# Patient Record
Sex: MALE | Race: WHITE | Employment: OTHER | URBAN - METROPOLITAN AREA
[De-identification: names, ages, dates, MRNs, and addresses within clinical notes are randomized per-mention and may not be internally consistent; named-entity substitution may affect disease eponyms.]

---

## 2017-02-14 RX ORDER — ENALAPRIL MALEATE 10 MG/1
TABLET ORAL
Qty: 90 TABLET | Refills: 0 | Status: SHIPPED | OUTPATIENT
Start: 2017-02-14 | End: 2017-06-13 | Stop reason: SDUPTHER

## 2017-02-14 RX ORDER — AMLODIPINE BESYLATE 10 MG/1
TABLET ORAL
Qty: 90 TABLET | Refills: 0 | Status: SHIPPED | OUTPATIENT
Start: 2017-02-14 | End: 2017-06-13 | Stop reason: SDUPTHER

## 2017-06-13 RX ORDER — ENALAPRIL MALEATE 10 MG/1
TABLET ORAL
Qty: 90 TABLET | Refills: 0 | Status: SHIPPED | OUTPATIENT
Start: 2017-06-13 | End: 2017-09-07 | Stop reason: SDUPTHER

## 2017-06-13 RX ORDER — AMLODIPINE BESYLATE 10 MG/1
TABLET ORAL
Qty: 90 TABLET | Refills: 0 | Status: SHIPPED | OUTPATIENT
Start: 2017-06-13 | End: 2017-09-07 | Stop reason: SDUPTHER

## 2017-09-07 RX ORDER — AMLODIPINE BESYLATE 10 MG/1
TABLET ORAL
Qty: 90 TABLET | Refills: 0 | Status: SHIPPED | OUTPATIENT
Start: 2017-09-07 | End: 2017-12-01 | Stop reason: SDUPTHER

## 2017-09-07 RX ORDER — ENALAPRIL MALEATE 10 MG/1
TABLET ORAL
Qty: 90 TABLET | Refills: 0 | Status: SHIPPED | OUTPATIENT
Start: 2017-09-07 | End: 2017-12-01 | Stop reason: SDUPTHER

## 2017-12-01 RX ORDER — ENALAPRIL MALEATE 10 MG/1
TABLET ORAL
Qty: 90 TABLET | Refills: 0 | Status: SHIPPED | OUTPATIENT
Start: 2017-12-01 | End: 2020-02-21

## 2017-12-01 RX ORDER — AMLODIPINE BESYLATE 10 MG/1
TABLET ORAL
Qty: 90 TABLET | Refills: 0 | Status: SHIPPED | OUTPATIENT
Start: 2017-12-01 | End: 2018-02-25 | Stop reason: SDUPTHER

## 2017-12-01 NOTE — TELEPHONE ENCOUNTER
Next Visit Date:  No future appointments.     Health Maintenance   Topic Date Due    Hepatitis C screen  1950    DTaP/Tdap/Td vaccine (1 - Tdap) 05/18/1967    Colon cancer screen colonoscopy  05/18/1998    Zostavax vaccine  05/18/2008    Pneumococcal low/med risk (1 of 2 - PCV13) 05/18/2013    Flu vaccine (1) 09/01/2017    Lipid screen  01/30/2020       No results found for: LABA1C          ( goal A1C is < 7)   No results found for: LABMICR  LDL Calculated (mg/dL)   Date Value   01/30/2015 110       (goal LDL is <100)   AST (U/L)   Date Value   04/09/2012 25     ALT (U/L)   Date Value   04/09/2012 32     BUN (mg/dL)   Date Value   01/15/2015 11     BP Readings from Last 3 Encounters:   02/08/16 114/74   07/22/15 120/80   02/23/15 122/72          (goal 120/80)    All Future Testing planned in [facility]              Patient Active Problem List:     History of calcium pyrophosphate deposition disease (CPPD)     Inflammatory arthropathy

## 2017-12-04 RX ORDER — ENALAPRIL MALEATE 10 MG/1
TABLET ORAL
Qty: 90 TABLET | Refills: 0 | Status: SHIPPED | OUTPATIENT
Start: 2017-12-04 | End: 2017-12-06 | Stop reason: SDUPTHER

## 2017-12-06 ENCOUNTER — OFFICE VISIT (OUTPATIENT)
Dept: FAMILY MEDICINE CLINIC | Age: 67
End: 2017-12-06
Payer: MEDICARE

## 2017-12-06 VITALS
OXYGEN SATURATION: 96 % | SYSTOLIC BLOOD PRESSURE: 126 MMHG | HEART RATE: 72 BPM | WEIGHT: 240.5 LBS | DIASTOLIC BLOOD PRESSURE: 80 MMHG | BODY MASS INDEX: 36.57 KG/M2

## 2017-12-06 DIAGNOSIS — Z72.89 OTHER PROBLEMS RELATED TO LIFESTYLE: ICD-10-CM

## 2017-12-06 DIAGNOSIS — R06.02 SOB (SHORTNESS OF BREATH): Primary | ICD-10-CM

## 2017-12-06 DIAGNOSIS — Z12.11 COLON CANCER SCREENING: ICD-10-CM

## 2017-12-06 DIAGNOSIS — Z00.00 WELL ADULT EXAM: ICD-10-CM

## 2017-12-06 DIAGNOSIS — M79.604 PAIN OF RIGHT LOWER EXTREMITY: ICD-10-CM

## 2017-12-06 DIAGNOSIS — I10 ESSENTIAL HYPERTENSION: ICD-10-CM

## 2017-12-06 PROCEDURE — 1036F TOBACCO NON-USER: CPT | Performed by: FAMILY MEDICINE

## 2017-12-06 PROCEDURE — 4040F PNEUMOC VAC/ADMIN/RCVD: CPT | Performed by: FAMILY MEDICINE

## 2017-12-06 PROCEDURE — 99214 OFFICE O/P EST MOD 30 MIN: CPT | Performed by: FAMILY MEDICINE

## 2017-12-06 PROCEDURE — 1123F ACP DISCUSS/DSCN MKR DOCD: CPT | Performed by: FAMILY MEDICINE

## 2017-12-06 PROCEDURE — 3017F COLORECTAL CA SCREEN DOC REV: CPT | Performed by: FAMILY MEDICINE

## 2017-12-06 PROCEDURE — G8484 FLU IMMUNIZE NO ADMIN: HCPCS | Performed by: FAMILY MEDICINE

## 2017-12-06 PROCEDURE — G8417 CALC BMI ABV UP PARAM F/U: HCPCS | Performed by: FAMILY MEDICINE

## 2017-12-06 PROCEDURE — G8427 DOCREV CUR MEDS BY ELIG CLIN: HCPCS | Performed by: FAMILY MEDICINE

## 2017-12-06 NOTE — PROGRESS NOTES
rhonchi noted. No respiratory retractions noted. Wall has symmetrical movement with respirations. No plapable tenderness of the hips /back    From of the le  Assessment:   Encounter Diagnoses   Name Primary?  Colon cancer screening     SOB (shortness of breath) Yes    Pain of right lower extremity     Well adult exam     Essential hypertension     Other problems related to lifestyle          Plan:   Orders Placed This Encounter   Procedures    COLONOSCOPY (Screening)     Order Specific Question:   Screening or Diagnostic? Answer:   Screening    CBC Auto Differential     Standing Status:   Future     Standing Expiration Date:   12/6/2018    Comprehensive Metabolic Panel     Standing Status:   Future     Standing Expiration Date:   12/6/2018    Lipid Panel     Standing Status:   Future     Standing Expiration Date:   12/6/2018     Order Specific Question:   Is Patient Fasting?/# of Hours     Answer:   8 hour fasting (no calories)    TSH With Reflex Ft4     Standing Status:   Future     Standing Expiration Date:   12/6/2018    Hepatitis C Antibody     Standing Status:   Future     Standing Expiration Date:   12/6/2018     Move legs while driving.     Sounds like ue deconditioning

## 2017-12-07 LAB — HEPATITIS C ANTIBODY: NEGATIVE

## 2017-12-08 ENCOUNTER — TELEPHONE (OUTPATIENT)
Dept: FAMILY MEDICINE CLINIC | Age: 67
End: 2017-12-08

## 2017-12-08 DIAGNOSIS — Z12.11 SCREENING FOR COLON CANCER: Primary | ICD-10-CM

## 2017-12-08 LAB
ABSOLUTE BASO #: 0.1 K/UL (ref 0–0.1)
ABSOLUTE EOS #: 0.3 K/UL (ref 0.1–0.4)
ABSOLUTE LYMPH #: 2.2 K/UL (ref 0.8–5.2)
ABSOLUTE MONO #: 0.6 K/UL (ref 0.1–0.9)
ABSOLUTE NEUT #: 3.6 K/UL (ref 1.3–9.1)
ALBUMIN SERPL-MCNC: 4.3 G/DL (ref 3.2–5.3)
ALK PHOSPHATASE: 66 IU/L (ref 35–121)
ALT SERPL-CCNC: 23 IU/L (ref 5–59)
ANION GAP SERPL CALCULATED.3IONS-SCNC: 12 MMOL/L
AST SERPL-CCNC: 21 IU/L (ref 10–42)
BASOPHILS RELATIVE PERCENT: 1.3 %
BILIRUB SERPL-MCNC: 0.4 MG/DL (ref 0.2–1.3)
BUN BLDV-MCNC: 12 MG/DL (ref 10–20)
CALCIUM SERPL-MCNC: 9.3 MG/DL (ref 8.7–10.8)
CHLORIDE BLD-SCNC: 104 MMOL/L (ref 95–111)
CHOLESTEROL/HDL RATIO: 4.5
CHOLESTEROL: 214 MG/DL
CO2: 24 MMOL/L (ref 21–32)
CREAT SERPL-MCNC: 1 MG/DL (ref 0.5–1.3)
EGFR AFRICAN AMERICAN: 90
EGFR IF NONAFRICAN AMERICAN: 74
EOSINOPHILS RELATIVE PERCENT: 4.4 %
GLUCOSE: 125 MG/DL (ref 70–100)
HCT VFR BLD CALC: 42.7 % (ref 41.4–51)
HDLC SERPL-MCNC: 48 MG/DL (ref 40–60)
HEMOGLOBIN: 14.3 G/DL (ref 13.8–17)
LDL CHOLESTEROL CALCULATED: 134 MG/DL
LDL/HDL RATIO: 2.8
LYMPHOCYTE %: 32.2 %
MCH RBC QN AUTO: 28.1 PG (ref 27–34)
MCHC RBC AUTO-ENTMCNC: 33.5 G/DL (ref 31–36)
MCV RBC AUTO: 84.1 FL (ref 80–100)
MONOCYTES # BLD: 8.3 %
NEUTROPHILS RELATIVE PERCENT: 53.4 %
PDW BLD-RTO: 13.2 % (ref 10.8–14.8)
PLATELETS: 247 K/UL (ref 150–450)
POTASSIUM SERPL-SCNC: 4 MMOL/L (ref 3.5–5.4)
RBC: 5.08 M/UL (ref 4–5.5)
SODIUM BLD-SCNC: 136 MMOL/L (ref 134–147)
T4 FREE: 1.15 NG/DL (ref 0.8–1.8)
TOTAL PROTEIN: 6.7 G/DL (ref 5.8–8)
TRIGL SERPL-MCNC: 159 MG/DL
TSH SERPL DL<=0.05 MIU/L-ACNC: 1.46 UIU/ML (ref 0.4–4.4)
VLDLC SERPL CALC-MCNC: 32 MG/DL
WBC: 6.8 K/UL (ref 3.7–10.8)

## 2017-12-11 DIAGNOSIS — R79.89 ELEVATED TSH: Primary | ICD-10-CM

## 2017-12-14 DIAGNOSIS — I10 ESSENTIAL HYPERTENSION: ICD-10-CM

## 2017-12-14 DIAGNOSIS — Z72.89 OTHER PROBLEMS RELATED TO LIFESTYLE: ICD-10-CM

## 2017-12-14 DIAGNOSIS — Z00.00 WELL ADULT EXAM: ICD-10-CM

## 2017-12-14 DIAGNOSIS — R79.89 ELEVATED TSH: ICD-10-CM

## 2017-12-14 DIAGNOSIS — R06.02 SOB (SHORTNESS OF BREATH): ICD-10-CM

## 2017-12-18 DIAGNOSIS — Z12.11 COLON CANCER SCREENING: Primary | ICD-10-CM

## 2017-12-18 RX ORDER — POLYETHYLENE GLYCOL 3350 17 G/17G
POWDER, FOR SOLUTION ORAL
Qty: 255 G | Refills: 0 | Status: SHIPPED | OUTPATIENT
Start: 2017-12-18 | End: 2018-01-17

## 2017-12-29 ENCOUNTER — HOSPITAL ENCOUNTER (OUTPATIENT)
Age: 67
Setting detail: OUTPATIENT SURGERY
Discharge: HOME | End: 2017-12-29
Attending: INTERNAL MEDICINE | Admitting: INTERNAL MEDICINE
Payer: MEDICARE

## 2017-12-29 VITALS
HEART RATE: 80 BPM | SYSTOLIC BLOOD PRESSURE: 111 MMHG | WEIGHT: 230 LBS | BODY MASS INDEX: 36.1 KG/M2 | TEMPERATURE: 97.9 F | DIASTOLIC BLOOD PRESSURE: 80 MMHG | HEIGHT: 67 IN | OXYGEN SATURATION: 100 % | RESPIRATION RATE: 14 BRPM

## 2017-12-29 PROCEDURE — 7100000011 HC PHASE II RECOVERY - ADDTL 15 MIN: Performed by: INTERNAL MEDICINE

## 2017-12-29 PROCEDURE — 99153 MOD SED SAME PHYS/QHP EA: CPT | Performed by: INTERNAL MEDICINE

## 2017-12-29 PROCEDURE — 2580000003 HC RX 258: Performed by: INTERNAL MEDICINE

## 2017-12-29 PROCEDURE — 3609027000 HC COLONOSCOPY: Performed by: INTERNAL MEDICINE

## 2017-12-29 PROCEDURE — 6360000002 HC RX W HCPCS: Performed by: INTERNAL MEDICINE

## 2017-12-29 PROCEDURE — 99152 MOD SED SAME PHYS/QHP 5/>YRS: CPT | Performed by: INTERNAL MEDICINE

## 2017-12-29 PROCEDURE — 7100000010 HC PHASE II RECOVERY - FIRST 15 MIN: Performed by: INTERNAL MEDICINE

## 2017-12-29 RX ORDER — SODIUM CHLORIDE 9 MG/ML
INJECTION, SOLUTION INTRAVENOUS CONTINUOUS
Status: DISCONTINUED | OUTPATIENT
Start: 2017-12-29 | End: 2017-12-29 | Stop reason: HOSPADM

## 2017-12-29 RX ORDER — FENTANYL CITRATE 50 UG/ML
INJECTION, SOLUTION INTRAMUSCULAR; INTRAVENOUS PRN
Status: DISCONTINUED | OUTPATIENT
Start: 2017-12-29 | End: 2017-12-29 | Stop reason: HOSPADM

## 2017-12-29 RX ORDER — MIDAZOLAM HYDROCHLORIDE 1 MG/ML
INJECTION INTRAMUSCULAR; INTRAVENOUS PRN
Status: DISCONTINUED | OUTPATIENT
Start: 2017-12-29 | End: 2017-12-29 | Stop reason: HOSPADM

## 2017-12-29 RX ADMIN — SODIUM CHLORIDE: 9 INJECTION, SOLUTION INTRAVENOUS at 09:08

## 2017-12-29 RX ADMIN — SODIUM CHLORIDE: 9 INJECTION, SOLUTION INTRAVENOUS at 10:30

## 2017-12-29 ASSESSMENT — PAIN - FUNCTIONAL ASSESSMENT: PAIN_FUNCTIONAL_ASSESSMENT: 0-10

## 2017-12-29 ASSESSMENT — PAIN SCALES - GENERAL
PAINLEVEL_OUTOF10: 0
PAINLEVEL_OUTOF10: 0

## 2017-12-29 NOTE — H&P
BY MOUTH ONE TIME A DAY 90 tablet 0    oxyCODONE-acetaminophen (PERCOCET) 5-325 MG per tablet Take by mouth every 4 hours as needed  0    polyethylene glycol (GLYCOLAX) powder Please dispense with 4 dulcolax tabs with this prescription. Use as directed by following your patient instructions given by office. 255 g 0    celecoxib (CELEBREX) 200 MG capsule Take by mouth daily  0    CIALIS 20 MG tablet TAKE AS DIRECTED BY YOUR PRESCRIBER 90 tablet 3    aspirin 81 MG tablet Take 81 mg by mouth daily.  Multiple Vitamins-Minerals (MULTIVITAMIN PO) Take  by mouth.  CALCIUM PO Take  by mouth. General health:  Fairly good. No fever or chills. Skin:  No itching, redness or rash. HEENT:  No headache, epistaxis or sore throat. Glasses, upper/lower dentures. Neck:  No pain, stiffness or masses. Cardiovascular/Respiratory system:  No chest pain, palpitation or shortness of breath. Gastrointestinal tract: No abdominal pain, nausea, vomiting, diarrhea or constipation. Genitourinary:  No burning on micturition. No hesitancy, urgency, frequency or discoloration of urine. Locomotor:  No bone or joint pains. No swelling. Arthritis, hands. Neuropsychiatric:  No referable complaints. See HPI. GENERAL PHYSICAL EXAM:     Vitals: /76   Pulse 81   Temp 97.5 °F (36.4 °C) (Oral)   Resp 18   Ht 5' 7\" (1.702 m)   Wt 230 lb (104.3 kg)   SpO2 95%   BMI 36.02 kg/m²  Body mass index is 36.02 kg/m². GENERAL APPEARANCE:   Fay Loveless is 69 y.o.,  male, moderately obese, nourished, conscious, alert. Does not appear to be distress or pain at this time. SKIN:  Warm, dry, no cyanosis or jaundice. HEAD:  Normocephalic, atraumatic, no swelling or tenderness. EYES:  Pupils equal, reactive to light. EARS:  No discharge, no marked hearing loss. NOSE:  No rhinorrhea, epistaxis or septal deformity. THROAT:  Not congested. No ulceration bleeding or discharge. Upper/lower denture                 NECK:  No stiffness, trachea central.  No palpable masses or L.N.                 CHEST:  Symmetrical and equal on expansion. HEART:  RRR S1 > S2. No audible murmurs or gallops. LUNGS:  Equal on expansion, normal breath sounds. No adventitious sounds. ABDOMEN:  Obese. Soft on palpation. No localized tenderness. No guarding or rigidity. No palpable organomegaly. LYMPHATICS:  No palpable cervical lymphadenopathy. LOCOMOTOR, BACK AND SPINE:  No tenderness or deformities. EXTREMITIES:  Symmetrical, no pedal edema. Pedros sign negative. No discoloration or ulcerations. NEUROLOGIC:  The patient is conscious, alert, oriented, No apparent focal sensory or motor deficits.                                                                                      PROVISIONAL DIAGNOSES / SURGERY:      Colonoscopy      Patient Active Problem List    Diagnosis Date Noted    Diverticulosis of colon     Colon cancer screening 12/06/2017    History of calcium pyrophosphate deposition disease (CPPD) 02/23/2015    Inflammatory arthropathy 02/23/2015         ASA 2, Mallampati 2  JEFFERSON GILLESPIE NP on 12/29/2017 at 9:09 AM

## 2017-12-29 NOTE — OP NOTE
COLONOSCOPY    DATE OF PROCEDURE: 12/29/2017    SURGEON: Errol Bumpers, MD    ASSISTANT: None    PREOPERATIVE DIAGNOSIS: Screening examination    POSTOPERATIVE DIAGNOSIS: Diverticulosis    OPERATION: Total colonoscopy     ANESTHESIA: Moderate Sedation    ESTIMATED BLOOD LOSS: None    COMPLICATIONS: None     SPECIMENS:  Was Not Obtained    HISTORY: The patient is a 69y.o. year old male with history of above preop diagnosis. I recommended colonoscopy with possible biopsy or polypectomy and I explained the risk, benefits, expected outcome, and alternatives to the procedure. Risks included but are not limited to bleeding, infection, respiratory distress, hypotension, and perforation of the colon and possibility of missing a lesion. The patient understands and is in agreement. PROCEDURE: The patient was given IV conscious sedation. The patient's SPO2 remained above 90% throughout the procedure. Digital rectal exam was normal.  The colonoscope was inserted through the anus into the rectum and advanced under direct vision to the cecum without difficulty. The prep was good. yp  Findings:      Cecum/Ascending colon: normal    Transverse colon: normal    Descending/Sigmoid colon: normal , Has diverticulosis    Rectum/Anus: examined in normal and retroflexed positions and was normal    Withdrawal Time was (minutes): 10          The colon was decompressed and the scope was removed. The patient tolerated the procedure well without complications. Recommendations/Plan:   1. F/U Biopsies  2. F/U In Office as instructed  3. Discussed with the family  4. High fiber diet   5. Precautions to avoid constipation     Next colonoscopy: 10 years.     Electronically signed by Errol Bumpers, MD  on 12/29/2017 at 10:12 AM

## 2018-02-02 LAB
T4 FREE: 1.09 NG/DL (ref 0.8–1.8)
TSH SERPL DL<=0.05 MIU/L-ACNC: 2.18 UIU/ML (ref 0.4–4.4)

## 2018-02-25 RX ORDER — AMLODIPINE BESYLATE 10 MG/1
TABLET ORAL
Qty: 90 TABLET | Refills: 0 | Status: SHIPPED | OUTPATIENT
Start: 2018-02-25 | End: 2018-03-06 | Stop reason: SDUPTHER

## 2018-03-06 RX ORDER — ENALAPRIL MALEATE 10 MG/1
TABLET ORAL
Qty: 90 TABLET | Refills: 0 | Status: SHIPPED | OUTPATIENT
Start: 2018-03-06 | End: 2018-08-28 | Stop reason: SDUPTHER

## 2018-03-06 RX ORDER — AMLODIPINE BESYLATE 10 MG/1
TABLET ORAL
Qty: 90 TABLET | Refills: 0 | Status: SHIPPED | OUTPATIENT
Start: 2018-03-06 | End: 2018-08-28 | Stop reason: SDUPTHER

## 2018-03-06 NOTE — TELEPHONE ENCOUNTER
Next Visit Date:  No future appointments.     Health Maintenance   Topic Date Due    AAA screen  1950    DTaP/Tdap/Td vaccine (1 - Tdap) 05/18/1967    Diabetes screen  05/18/1988    Shingles Vaccine (1 of 2 - 2 Dose Series) 05/18/1998    Pneumococcal low/med risk (1 of 2 - PCV13) 05/18/2013    Flu vaccine (1) 09/24/2018 (Originally 9/1/2017)    Potassium monitoring  12/07/2018    Creatinine monitoring  12/07/2018    Lipid screen  12/07/2022    Colon cancer screen colonoscopy  12/29/2027    Hepatitis C screen  Completed       No results found for: LABA1C          ( goal A1C is < 7)   No results found for: LABMICR  LDL Calculated (mg/dL)   Date Value   12/07/2017 134 (H)       (goal LDL is <100)   AST (IU/L)   Date Value   12/07/2017 21     ALT (IU/L)   Date Value   12/07/2017 23     BUN (mg/dl)   Date Value   12/07/2017 12     BP Readings from Last 3 Encounters:   12/29/17 111/80   12/06/17 126/80   02/08/16 114/74          (goal 120/80)    All Future Testing planned in [facility]              Patient Active Problem List:     History of calcium pyrophosphate deposition disease (CPPD)     Inflammatory arthropathy     Colon cancer screening     Diverticulosis of colon

## 2018-04-12 PROBLEM — Z12.11 COLON CANCER SCREENING: Status: RESOLVED | Noted: 2017-12-06 | Resolved: 2018-04-12

## 2018-04-17 ENCOUNTER — TELEPHONE (OUTPATIENT)
Dept: FAMILY MEDICINE CLINIC | Age: 68
End: 2018-04-17

## 2018-04-27 ENCOUNTER — OFFICE VISIT (OUTPATIENT)
Dept: FAMILY MEDICINE CLINIC | Age: 68
End: 2018-04-27
Payer: MEDICARE

## 2018-04-27 VITALS
HEART RATE: 66 BPM | DIASTOLIC BLOOD PRESSURE: 78 MMHG | OXYGEN SATURATION: 97 % | BODY MASS INDEX: 34.93 KG/M2 | WEIGHT: 223 LBS | SYSTOLIC BLOOD PRESSURE: 138 MMHG

## 2018-04-27 DIAGNOSIS — I10 ESSENTIAL HYPERTENSION: Primary | ICD-10-CM

## 2018-04-27 PROCEDURE — 4040F PNEUMOC VAC/ADMIN/RCVD: CPT | Performed by: FAMILY MEDICINE

## 2018-04-27 PROCEDURE — 1123F ACP DISCUSS/DSCN MKR DOCD: CPT | Performed by: FAMILY MEDICINE

## 2018-04-27 PROCEDURE — G8417 CALC BMI ABV UP PARAM F/U: HCPCS | Performed by: FAMILY MEDICINE

## 2018-04-27 PROCEDURE — 1036F TOBACCO NON-USER: CPT | Performed by: FAMILY MEDICINE

## 2018-04-27 PROCEDURE — G8427 DOCREV CUR MEDS BY ELIG CLIN: HCPCS | Performed by: FAMILY MEDICINE

## 2018-04-27 PROCEDURE — 99213 OFFICE O/P EST LOW 20 MIN: CPT | Performed by: FAMILY MEDICINE

## 2018-04-27 PROCEDURE — 3017F COLORECTAL CA SCREEN DOC REV: CPT | Performed by: FAMILY MEDICINE

## 2018-04-27 ASSESSMENT — PATIENT HEALTH QUESTIONNAIRE - PHQ9
2. FEELING DOWN, DEPRESSED OR HOPELESS: 0
SUM OF ALL RESPONSES TO PHQ9 QUESTIONS 1 & 2: 0
SUM OF ALL RESPONSES TO PHQ QUESTIONS 1-9: 0
1. LITTLE INTEREST OR PLEASURE IN DOING THINGS: 0

## 2018-08-28 NOTE — TELEPHONE ENCOUNTER
Last visit: 4-27-18      Next Visit Date:  Future Appointments  Date Time Provider Yasmani Anomi   10/11/2018 1:00 PM Alban Adames  [address] [facility]   Topic Date Due    AAA screen  1950    DTaP/Tdap/Td vaccine (1 - Tdap) 05/18/1967    Diabetes screen  05/18/1988    Shingles Vaccine (1 of 2 - 2 Dose Series) 05/18/1998    Pneumococcal low/med risk (1 of 2 - PCV13) 05/18/2013    Flu vaccine (1) 09/24/2018 (Originally 9/1/2018)    Potassium monitoring  12/07/2018    Creatinine monitoring  12/07/2018    Lipid screen  12/07/2022    Colon cancer screen colonoscopy  12/29/2027    Hepatitis C screen  Completed       No results found for: LABA1C          ( goal A1C is < 7)   No results found for: LABMICR  LDL Calculated (mg/dL)   Date Value   12/07/2017 134 (H)   01/30/2015 110       (goal LDL is <100)   AST (IU/L)   Date Value   12/07/2017 21     ALT (IU/L)   Date Value   12/07/2017 23     BUN (mg/dl)   Date Value   12/07/2017 12     BP Readings from Last 3 Encounters:   04/27/18 138/78   12/29/17 111/80   12/06/17 126/80          (goal 120/80)    All Future Testing planned in [facility]              Patient Active Problem List:     History of calcium pyrophosphate deposition disease (CPPD)     Inflammatory arthropathy     Diverticulosis of colon

## 2018-08-29 RX ORDER — ENALAPRIL MALEATE 10 MG/1
TABLET ORAL
Qty: 90 TABLET | Refills: 0 | Status: SHIPPED | OUTPATIENT
Start: 2018-08-29 | End: 2018-10-11 | Stop reason: SDUPTHER

## 2018-08-29 RX ORDER — AMLODIPINE BESYLATE 10 MG/1
TABLET ORAL
Qty: 90 TABLET | Refills: 0 | Status: SHIPPED | OUTPATIENT
Start: 2018-08-29 | End: 2018-11-28 | Stop reason: SDUPTHER

## 2018-10-11 ENCOUNTER — OFFICE VISIT (OUTPATIENT)
Dept: FAMILY MEDICINE CLINIC | Age: 68
End: 2018-10-11
Payer: MEDICARE

## 2018-10-11 VITALS
SYSTOLIC BLOOD PRESSURE: 120 MMHG | BODY MASS INDEX: 35.24 KG/M2 | WEIGHT: 225 LBS | OXYGEN SATURATION: 95 % | DIASTOLIC BLOOD PRESSURE: 80 MMHG | HEART RATE: 78 BPM

## 2018-10-11 DIAGNOSIS — I10 ESSENTIAL HYPERTENSION: Primary | ICD-10-CM

## 2018-10-11 PROCEDURE — G8427 DOCREV CUR MEDS BY ELIG CLIN: HCPCS | Performed by: FAMILY MEDICINE

## 2018-10-11 PROCEDURE — 4040F PNEUMOC VAC/ADMIN/RCVD: CPT | Performed by: FAMILY MEDICINE

## 2018-10-11 PROCEDURE — 1123F ACP DISCUSS/DSCN MKR DOCD: CPT | Performed by: FAMILY MEDICINE

## 2018-10-11 PROCEDURE — 3017F COLORECTAL CA SCREEN DOC REV: CPT | Performed by: FAMILY MEDICINE

## 2018-10-11 PROCEDURE — 99213 OFFICE O/P EST LOW 20 MIN: CPT | Performed by: FAMILY MEDICINE

## 2018-10-11 PROCEDURE — G8484 FLU IMMUNIZE NO ADMIN: HCPCS | Performed by: FAMILY MEDICINE

## 2018-10-11 PROCEDURE — 1101F PT FALLS ASSESS-DOCD LE1/YR: CPT | Performed by: FAMILY MEDICINE

## 2018-10-11 PROCEDURE — 1036F TOBACCO NON-USER: CPT | Performed by: FAMILY MEDICINE

## 2018-10-11 PROCEDURE — G8417 CALC BMI ABV UP PARAM F/U: HCPCS | Performed by: FAMILY MEDICINE

## 2018-10-11 RX ORDER — MELOXICAM 15 MG/1
TABLET ORAL DAILY
Refills: 1 | COMMUNITY
Start: 2018-09-17 | End: 2022-09-30

## 2018-10-11 NOTE — PROGRESS NOTES
Visit Information    Have you changed or started any medications since your last visit including any over-the-counter medicines, vitamins, or herbal medicines? no   Have you stopped taking any of your medications? Is so, why? -  no  Are you having any side effects from any of your medications? - no    Have you seen any other physician or provider since your last visit?  no   Have you had any other diagnostic tests since your last visit?  no   Have you been seen in the emergency room and/or had an admission in a hospital since we last saw you?  no   Have you had your routine dental cleaning in the past 6 months? Do you have an active MyChart account? If no, what is the barrier?   No    Patient Care Team:  Mohan Del Toro MD as PCP - General (Family Medicine)  Nalini Esparza (Rheumatology)  Lyn Terrell as Consulting Physician (Orthopedic Surgery)    Medical History Review  Past Medical, Family, and Social History reviewed and  contribute to the patient presenting condition    Health Maintenance   Topic Date Due    AAA screen  1950    DTaP/Tdap/Td vaccine (1 - Tdap) 05/18/1967    Diabetes screen  05/18/1988    Shingles Vaccine (1 of 2 - 2 Dose Series) 05/18/1998    Pneumococcal low/med risk (1 of 2 - PCV13) 05/18/2013    Flu vaccine (1) 09/01/2018    Potassium monitoring  12/07/2018    Creatinine monitoring  12/07/2018    Lipid screen  12/07/2022    Colon cancer screen colonoscopy  12/29/2027    Hepatitis C screen  Completed

## 2018-11-26 LAB
ALBUMIN SERPL-MCNC: 4.1 G/DL
ALP BLD-CCNC: 64 U/L
ALT SERPL-CCNC: 25 U/L
ANION GAP SERPL CALCULATED.3IONS-SCNC: NORMAL MMOL/L
AST SERPL-CCNC: 22 U/L
BASOPHILS ABSOLUTE: 0.08 /ΜL
BASOPHILS RELATIVE PERCENT: ABNORMAL %
BILIRUB SERPL-MCNC: 0.3 MG/DL (ref 0.1–1.4)
BUN BLDV-MCNC: 10 MG/DL
CALCIUM SERPL-MCNC: 9.5 MG/DL
CHLORIDE BLD-SCNC: 103 MMOL/L
CHOLESTEROL, TOTAL: 186 MG/DL
CHOLESTEROL/HDL RATIO: 4.4
CO2: 27 MMOL/L
CREAT SERPL-MCNC: 0.93 MG/DL
EOSINOPHILS ABSOLUTE: 0.64 /ΜL
EOSINOPHILS RELATIVE PERCENT: ABNORMAL %
GFR CALCULATED: 80.3
GLUCOSE BLD-MCNC: 125 MG/DL
HCT VFR BLD CALC: 40.6 % (ref 41–53)
HDLC SERPL-MCNC: 42 MG/DL (ref 35–70)
HEMOGLOBIN: 14.2 G/DL (ref 13.5–17.5)
LDL CHOLESTEROL CALCULATED: 111 MG/DL (ref 0–160)
LYMPHOCYTES ABSOLUTE: 1.97 /ΜL
LYMPHOCYTES RELATIVE PERCENT: ABNORMAL %
MCH RBC QN AUTO: 29.2 PG
MCHC RBC AUTO-ENTMCNC: 35 G/DL
MCV RBC AUTO: 83.5 FL
MONOCYTES ABSOLUTE: 0.62 /ΜL
MONOCYTES RELATIVE PERCENT: ABNORMAL %
NEUTROPHILS ABSOLUTE: 4.21 /ΜL
NEUTROPHILS RELATIVE PERCENT: ABNORMAL %
PDW BLD-RTO: 13.2 %
PLATELET # BLD: 229 K/ΜL
PMV BLD AUTO: 9.9 FL
POTASSIUM SERPL-SCNC: 4.2 MMOL/L
RBC # BLD: 4.86 10^6/ΜL
SODIUM BLD-SCNC: 139 MMOL/L
TOTAL PROTEIN: 6.8
TRIGL SERPL-MCNC: 167 MG/DL
TSH SERPL DL<=0.05 MIU/L-ACNC: 2.09 UIU/ML
VLDLC SERPL CALC-MCNC: 33 MG/DL
WBC # BLD: 7.25 10^3/ML

## 2018-11-28 DIAGNOSIS — I10 ESSENTIAL HYPERTENSION: ICD-10-CM

## 2018-11-29 RX ORDER — ENALAPRIL MALEATE 10 MG/1
TABLET ORAL
Qty: 90 TABLET | Refills: 0 | Status: SHIPPED | OUTPATIENT
Start: 2018-11-29 | End: 2019-04-11 | Stop reason: SDUPTHER

## 2018-11-29 RX ORDER — AMLODIPINE BESYLATE 10 MG/1
TABLET ORAL
Qty: 90 TABLET | Refills: 0 | Status: SHIPPED | OUTPATIENT
Start: 2018-11-29 | End: 2019-04-11 | Stop reason: SDUPTHER

## 2018-12-04 RX ORDER — ENALAPRIL MALEATE 10 MG/1
TABLET ORAL
Qty: 90 TABLET | Refills: 3 | Status: SHIPPED | OUTPATIENT
Start: 2018-12-04 | End: 2019-04-11 | Stop reason: SDUPTHER

## 2018-12-04 RX ORDER — AMLODIPINE BESYLATE 10 MG/1
TABLET ORAL
Qty: 90 TABLET | Refills: 3 | Status: SHIPPED | OUTPATIENT
Start: 2018-12-04 | End: 2020-02-21

## 2018-12-04 NOTE — TELEPHONE ENCOUNTER
Health Maintenance   Topic Date Due    AAA screen  1950    DTaP/Tdap/Td vaccine (1 - Tdap) 05/18/1967    Diabetes screen  05/18/1988    Shingles Vaccine (1 of 2 - 2 Dose Series) 05/18/1998    Pneumococcal low/med risk (1 of 2 - PCV13) 05/18/2013    Flu vaccine (1) 10/31/2019 (Originally 9/1/2018)    Potassium monitoring  11/26/2019    Creatinine monitoring  11/26/2019    Lipid screen  11/26/2023    Colon cancer screen colonoscopy  12/29/2027    Hepatitis C screen  Completed       No results found for: LABA1C          ( goal A1C is < 7)   No results found for: LABMICR  LDL Calculated (mg/dL)   Date Value   11/26/2018 111       (goal LDL is <100)   AST (U/L)   Date Value   11/26/2018 22     ALT (U/L)   Date Value   11/26/2018 25     BUN (mg/dL)   Date Value   11/26/2018 10     BP Readings from Last 3 Encounters:   10/11/18 120/80   04/27/18 138/78   12/29/17 111/80          (goal 120/80)    All Future Testing planned in [facility]      Next Visit Date:  Future Appointments  Date Time Provider Yasmani Salgado   4/11/2019 1:00 PM Fred Conner MD [facility] 40            Patient Active Problem List:     History of calcium pyrophosphate deposition disease (CPPD)     Inflammatory arthropathy     Diverticulosis of colon

## 2019-04-11 ENCOUNTER — OFFICE VISIT (OUTPATIENT)
Dept: FAMILY MEDICINE CLINIC | Age: 69
End: 2019-04-11
Payer: MEDICARE

## 2019-04-11 VITALS
HEART RATE: 72 BPM | HEIGHT: 68 IN | SYSTOLIC BLOOD PRESSURE: 126 MMHG | BODY MASS INDEX: 35.52 KG/M2 | OXYGEN SATURATION: 97 % | WEIGHT: 234.4 LBS | DIASTOLIC BLOOD PRESSURE: 82 MMHG

## 2019-04-11 DIAGNOSIS — M54.5 ACUTE LOW BACK PAIN, UNSPECIFIED BACK PAIN LATERALITY, WITH SCIATICA PRESENCE UNSPECIFIED: ICD-10-CM

## 2019-04-11 DIAGNOSIS — I10 ESSENTIAL HYPERTENSION: Primary | ICD-10-CM

## 2019-04-11 DIAGNOSIS — Z13.6 ENCOUNTER FOR ABDOMINAL AORTIC ANEURYSM (AAA) SCREENING: ICD-10-CM

## 2019-04-11 PROCEDURE — G8427 DOCREV CUR MEDS BY ELIG CLIN: HCPCS | Performed by: FAMILY MEDICINE

## 2019-04-11 PROCEDURE — 4040F PNEUMOC VAC/ADMIN/RCVD: CPT | Performed by: FAMILY MEDICINE

## 2019-04-11 PROCEDURE — 3017F COLORECTAL CA SCREEN DOC REV: CPT | Performed by: FAMILY MEDICINE

## 2019-04-11 PROCEDURE — G0009 ADMIN PNEUMOCOCCAL VACCINE: HCPCS | Performed by: FAMILY MEDICINE

## 2019-04-11 PROCEDURE — G8417 CALC BMI ABV UP PARAM F/U: HCPCS | Performed by: FAMILY MEDICINE

## 2019-04-11 PROCEDURE — 1123F ACP DISCUSS/DSCN MKR DOCD: CPT | Performed by: FAMILY MEDICINE

## 2019-04-11 PROCEDURE — 1036F TOBACCO NON-USER: CPT | Performed by: FAMILY MEDICINE

## 2019-04-11 PROCEDURE — 99214 OFFICE O/P EST MOD 30 MIN: CPT | Performed by: FAMILY MEDICINE

## 2019-04-11 PROCEDURE — 90670 PCV13 VACCINE IM: CPT | Performed by: FAMILY MEDICINE

## 2019-04-11 ASSESSMENT — PATIENT HEALTH QUESTIONNAIRE - PHQ9
SUM OF ALL RESPONSES TO PHQ QUESTIONS 1-9: 0
SUM OF ALL RESPONSES TO PHQ QUESTIONS 1-9: 0
1. LITTLE INTEREST OR PLEASURE IN DOING THINGS: 0
2. FEELING DOWN, DEPRESSED OR HOPELESS: 0
SUM OF ALL RESPONSES TO PHQ9 QUESTIONS 1 & 2: 0

## 2019-04-11 NOTE — PROGRESS NOTES
HYPERTENSION visit     BP Readings from Last 3 Encounters:   10/11/18 120/80   18 138/78   17 111/80       LDL Calculated (mg/dL)   Date Value   2018 111     HDL (mg/dL)   Date Value   2018 42     BUN (mg/dL)   Date Value   2018 10     CREATININE (no units)   Date Value   2018 0.93     Glucose (mg/dL)   Date Value   2018 125   2017 125 (H)              Have you changed or started any medications since your last visit including any over-the-counter medicines, vitamins, or herbal medicines? no   Have you stopped taking any of your medications? Is so, why? -  no  Are you having any side effects from any of your medications? - no  How often do you miss doses of your medication? rare      Have you seen any other physician or provider since your last visit? Yes, Kimo Alvarez   Have you had any other diagnostic tests since your last visit?  no   Have you been seen in the emergency room and/or had an admission in a hospital since we last saw you?  no   Have you had your routine dental cleaning in the past 6 months? Do you have an active MyChart account? If no, what is the barrier?   No: code     Patient Care Team:  Blayne Betts MD as PCP - General (Family Medicine)  Trini Jackson (Rheumatology)  Jacinto Baker as Consulting Physician (Orthopedic Surgery)    Medical History Review  Past Medical, Family, and Social History reviewed and contribute to the patient presenting condition    Health Maintenance   Topic Date Due    AAA screen  1950    DTaP/Tdap/Td vaccine (1 - Tdap) 1967    Shingles Vaccine (1 of 2) 1998    Pneumococcal 65+ years Vaccine (1 of 2 - PCV13) 2013    Flu vaccine (Season Ended) 10/31/2019 (Originally 2019)    Potassium monitoring  2019    Creatinine monitoring  2019    Lipid screen  2023    Colon cancer screen colonoscopy  2027    Hepatitis C screen  Completed

## 2019-04-11 NOTE — PROGRESS NOTES
Subjective:  Julian Chino presents for   Chief Complaint   Patient presents with    Hypertension    Back Pain     x4 days     Tolerating the meds    bp at home inhaled corticosteroids fine    Patient Active Problem List   Diagnosis    History of calcium pyrophosphate deposition disease (CPPD)    Inflammatory arthropathy    Diverticulosis of colon       Review of Systems:  · General: no significant weight changes. · Respiratory: no cough, pleuritic chest pain, dyspnea, or wheezing  · Cardiovascular: no pain, CADE, orthopnea, palpitations, or claudication  · Gastrointestinal: no chronic nausea, vomiting, heartburn, diarrhea, constipation, bloating, or abdominal pain. No bloody or black stools. Objective:  Physical Exam   Vitals:   Vitals:    04/11/19 1303   BP: 126/82   Pulse: 72   SpO2: 97%   Weight: 234 lb 6.4 oz (106.3 kg)   Height: 5' 8\" (1.727 m)     Wt Readings from Last 3 Encounters:   04/11/19 234 lb 6.4 oz (106.3 kg)   10/11/18 225 lb (102.1 kg)   04/27/18 223 lb (101.2 kg)     Ht Readings from Last 3 Encounters:   04/11/19 5' 8\" (1.727 m)   12/29/17 5' 7\" (1.702 m)   07/22/15 5' 8\" (1.727 m)     Body mass index is 35.64 kg/m². Constitutional: He is oriented to person, place, and time. He appears well-developed and well-nourished and in no acute distress. Answers all my questions appropriately. Head: Normocephalic and atraumatic. Eyes:conjunctiva appear normal.  Right Ear: External ear normal. TM is clear  Left Ear: External ear normal. TM is clear  Nose: pink, non-edematous mucosa. No polyps. No septal deviation  Throat: no erythema, tonsillar hypertrophy or exudate. No ulcerations noted. Lips/Teeth/Gums all appear normal.  Neck: Normal range of motion. Neck supple. No tracheal deviation present. No abnormal lymphadenopathy. No JVD noted. Carotids are clear bilaterally. No thyroid masses noted. Heart: RRR without murmur. No S3, S4, or gallop noted.   Chest: Clear to auscultation bilaterally. Good breath sounds noted. No rales, wheezes, or rhonchi noted. No respiratory retractions noted. Wall has symmetrical movement with respirations. Assessment:   Encounter Diagnoses   Name Primary?  Essential hypertension Yes    Acute low back pain, unspecified back pain laterality, with sciatica presence unspecified     Encounter for abdominal aortic aneurysm (AAA) screening          Plan:   Medications Discontinued During This Encounter   Medication Reason    amLODIPine (NORVASC) 10 MG tablet DUPLICATE    enalapril (VASOTEC) 10 MG tablet DUPLICATE    enalapril (VASOTEC) 10 MG tablet DUPLICATE    metoprolol tartrate (LOPRESSOR) 25 MG tablet DUPLICATE    metoprolol tartrate (LOPRESSOR) 25 MG tablet DUPLICATE    CIALIS 20 MG tablet     aspirin 81 MG tablet      THE ABOVE NOTED DISCONTINUED MEDS MAY ONLY BE FROM 'CLEANING UP' THE MED LIST AND WERE NOT ACTUALLY CANCELED;  SEE CHART FOR DETAILS! No orders of the defined types were placed in this encounter. Orders Placed This Encounter   Procedures    VL AAA SCREENING     Standing Status:   Future     Standing Expiration Date:   4/11/2020    Pneumococcal conjugate vaccine 13-valent     Return in about 6 months (around 10/11/2019).   Patient Instructions   Ask your pharmacist about getting the tetanus shot    Data Unavailable

## 2019-04-12 ENCOUNTER — HOSPITAL ENCOUNTER (OUTPATIENT)
Dept: VASCULAR LAB | Facility: CLINIC | Age: 69
Discharge: HOME | End: 2019-04-12
Payer: MEDICARE

## 2019-04-12 DIAGNOSIS — Z13.6 ENCOUNTER FOR ABDOMINAL AORTIC ANEURYSM (AAA) SCREENING: ICD-10-CM

## 2019-04-12 PROCEDURE — 93978 VASCULAR STUDY: CPT

## 2019-10-11 ENCOUNTER — OFFICE VISIT (OUTPATIENT)
Dept: FAMILY MEDICINE CLINIC | Age: 69
End: 2019-10-11
Payer: MEDICARE

## 2019-10-11 VITALS
BODY MASS INDEX: 35.94 KG/M2 | DIASTOLIC BLOOD PRESSURE: 70 MMHG | SYSTOLIC BLOOD PRESSURE: 124 MMHG | WEIGHT: 236.38 LBS | HEART RATE: 70 BPM | OXYGEN SATURATION: 97 %

## 2019-10-11 DIAGNOSIS — I10 ESSENTIAL HYPERTENSION: Primary | ICD-10-CM

## 2019-10-11 PROCEDURE — 1123F ACP DISCUSS/DSCN MKR DOCD: CPT | Performed by: FAMILY MEDICINE

## 2019-10-11 PROCEDURE — 4040F PNEUMOC VAC/ADMIN/RCVD: CPT | Performed by: FAMILY MEDICINE

## 2019-10-11 PROCEDURE — G0009 ADMIN PNEUMOCOCCAL VACCINE: HCPCS | Performed by: FAMILY MEDICINE

## 2019-10-11 PROCEDURE — 90732 PPSV23 VACC 2 YRS+ SUBQ/IM: CPT | Performed by: FAMILY MEDICINE

## 2019-10-11 PROCEDURE — G8427 DOCREV CUR MEDS BY ELIG CLIN: HCPCS | Performed by: FAMILY MEDICINE

## 2019-10-11 PROCEDURE — 3017F COLORECTAL CA SCREEN DOC REV: CPT | Performed by: FAMILY MEDICINE

## 2019-10-11 PROCEDURE — G8484 FLU IMMUNIZE NO ADMIN: HCPCS | Performed by: FAMILY MEDICINE

## 2019-10-11 PROCEDURE — 99213 OFFICE O/P EST LOW 20 MIN: CPT | Performed by: FAMILY MEDICINE

## 2019-10-11 PROCEDURE — G8417 CALC BMI ABV UP PARAM F/U: HCPCS | Performed by: FAMILY MEDICINE

## 2019-10-11 PROCEDURE — 1036F TOBACCO NON-USER: CPT | Performed by: FAMILY MEDICINE

## 2019-10-16 LAB
ABSOLUTE BASO #: 0.1 X10E9/L (ref 0–0.9)
ABSOLUTE EOS #: 0.2 X10E9/L (ref 0–0.4)
ABSOLUTE LYMPH #: 2.1 X10E9/L (ref 1–3.5)
ABSOLUTE MONO #: 0.5 X10E9/L (ref 0–0.9)
ABSOLUTE NEUT #: 3.6 X10E9/L (ref 1.5–6.6)
ALBUMIN SERPL-MCNC: 4.1 G/DL (ref 3.2–5.3)
ALK PHOSPHATASE: 72 U/L (ref 39–130)
ALT SERPL-CCNC: 17 U/L (ref 0–40)
ANION GAP SERPL CALCULATED.3IONS-SCNC: 10 MMOL/L (ref 4–12)
AST SERPL-CCNC: 16 U/L (ref 0–41)
BASOPHILS RELATIVE PERCENT: 1.2 %
BILIRUB SERPL-MCNC: 0.4 MG/DL (ref 0.3–1.2)
BUN BLDV-MCNC: 12 MG/DL (ref 5–27)
CALCIUM SERPL-MCNC: 9.3 MG/DL (ref 8.5–10.5)
CHLORIDE BLD-SCNC: 104 MMOL/L (ref 98–109)
CHOLESTEROL/HDL RATIO: 4.1 (ref 1–5)
CHOLESTEROL: 186 MG/DL (ref 150–200)
CO2: 27 MMOL/L (ref 22–32)
CREAT SERPL-MCNC: 1 MG/DL (ref 0.6–1.3)
EGFR AFRICAN AMERICAN: >60 ML/MIN/1.73SQ.M
EGFR IF NONAFRICAN AMERICAN: >60 ML/MIN/1.73SQ.M
EOSINOPHILS RELATIVE PERCENT: 3.6 %
GLUCOSE: 127 MG/DL (ref 65–99)
HCT VFR BLD CALC: 41.8 % (ref 37–51)
HDLC SERPL-MCNC: 45 MG/DL
HEMOGLOBIN: 14.3 G/DL (ref 12.6–17.4)
LDL CHOLESTEROL CALCULATED: 115 MG/DL
LDL/HDL RATIO: 2.6
LYMPHOCYTE %: 31.6 %
MCH RBC QN AUTO: 29 PG (ref 27–35)
MCHC RBC AUTO-ENTMCNC: 34.2 G/DL (ref 31–36)
MCV RBC AUTO: 85 FL (ref 81–101)
MONOCYTES # BLD: 8.3 %
NEUTROPHILS RELATIVE PERCENT: 55.3 %
PDW BLD-RTO: 14.2 % (ref 11.4–14.3)
PLATELETS: 254 X10E9/L (ref 150–450)
PMV BLD AUTO: 8.8 FL (ref 7–12)
POTASSIUM SERPL-SCNC: 4.2 MMOL/L (ref 3.5–5)
RBC: 4.94 X10E12/L (ref 3.9–5.8)
SODIUM BLD-SCNC: 141 MMOL/L (ref 134–146)
TOTAL PROTEIN: 6.5 G/DL (ref 6–8)
TRIGL SERPL-MCNC: 128 MG/DL (ref 27–150)
TSH SERPL DL<=0.05 MIU/L-ACNC: 1.98 UIU/ML (ref 0.49–4.67)
VLDLC SERPL CALC-MCNC: 26 MG/DL (ref 0–30)
WBC: 6.6 X10E9/L (ref 4.4–12)

## 2019-10-17 DIAGNOSIS — R73.09 ELEVATED GLUCOSE: ICD-10-CM

## 2019-10-17 DIAGNOSIS — E78.00 PURE HYPERCHOLESTEROLEMIA: Primary | ICD-10-CM

## 2019-10-17 RX ORDER — ATORVASTATIN CALCIUM 20 MG/1
20 TABLET, FILM COATED ORAL DAILY
Qty: 30 TABLET | Refills: 11 | Status: SHIPPED | OUTPATIENT
Start: 2019-10-17 | End: 2020-10-26

## 2020-02-21 RX ORDER — AMLODIPINE BESYLATE 10 MG/1
TABLET ORAL
Qty: 90 TABLET | Refills: 0 | Status: SHIPPED | OUTPATIENT
Start: 2020-02-21 | End: 2020-02-25

## 2020-02-21 RX ORDER — ENALAPRIL MALEATE 10 MG/1
TABLET ORAL
Qty: 90 TABLET | Refills: 0 | Status: SHIPPED | OUTPATIENT
Start: 2020-02-21 | End: 2020-02-25

## 2020-02-21 NOTE — TELEPHONE ENCOUNTER
Does patient have enough medication for 72 hours:     Next Visit Date:  Future Appointments   Date Time Provider Yasmnai Salgado   4/9/2020  1:20 PM Zabrina Jeffries  [address] [facility]   Topic Date Due    Annual Wellness Visit (AWV)  05/29/2019    Flu vaccine (1) 09/01/2019    Shingles Vaccine (1 of 2) 10/11/2020 (Originally 5/18/1998)    Lipid screen  10/16/2020    Potassium monitoring  10/16/2020    Creatinine monitoring  10/16/2020    Colon cancer screen colonoscopy  12/29/2027    DTaP/Tdap/Td vaccine (2 - Td) 04/11/2029    Pneumococcal 65+ years Vaccine  Completed    AAA screen  Completed    Hepatitis C screen  Completed    Hepatitis A vaccine  Aged Out    Hepatitis B vaccine  Aged Out    Hib vaccine  Aged Out    Meningococcal (ACWY) vaccine  Aged Out       No results found for: LABA1C          ( goal A1C is < 7)   No results found for: LABMICR  LDL Calculated (mg/dL)   Date Value   10/16/2019 115   11/26/2018 111       (goal LDL is <100)   AST (U/L)   Date Value   10/16/2019 16     ALT (U/L)   Date Value   10/16/2019 17     BUN (mg/dL)   Date Value   10/16/2019 12     BP Readings from Last 3 Encounters:   10/11/19 124/70   04/11/19 126/82   10/11/18 120/80          (goal 120/80)    All Future Testing planned in [facility]  Lab Frequency Next Occurrence   CBC Auto Differential Once 10/11/2019   Comprehensive Metabolic Panel Once 15/63/1747   Lipid Panel Once 10/11/2019   TSH with Reflex Once 10/11/2019   Lipid Panel Once 12/16/2019   Hepatic Function Panel Once 12/16/2019               Patient Active Problem List:     History of calcium pyrophosphate deposition disease (CPPD)     Inflammatory arthropathy     Diverticulosis of colon

## 2020-02-25 RX ORDER — AMLODIPINE BESYLATE 10 MG/1
TABLET ORAL
Qty: 90 TABLET | Refills: 0 | Status: SHIPPED | OUTPATIENT
Start: 2020-02-25 | End: 2020-08-19

## 2020-02-25 RX ORDER — ENALAPRIL MALEATE 10 MG/1
TABLET ORAL
Qty: 90 TABLET | Refills: 0 | Status: SHIPPED | OUTPATIENT
Start: 2020-02-25 | End: 2020-05-26

## 2020-04-30 ENCOUNTER — TELEPHONE (OUTPATIENT)
Dept: FAMILY MEDICINE CLINIC | Age: 70
End: 2020-04-30

## 2020-05-26 RX ORDER — ENALAPRIL MALEATE 10 MG/1
TABLET ORAL
Qty: 90 TABLET | Refills: 0 | Status: SHIPPED | OUTPATIENT
Start: 2020-05-26 | End: 2020-08-26

## 2020-05-26 NOTE — TELEPHONE ENCOUNTER
Next Visit Date:  Future Appointments   Date Time Provider Yasmani Naomi   6/9/2020  3:40 PM Vignesh Anderson  [address] [facility] Maintenance   Topic Date Due    Annual Wellness Visit (AWV)  05/29/2019    Shingles Vaccine (1 of 2) 10/11/2020 (Originally 5/18/1998)    Flu vaccine (Season Ended) 09/01/2020    Lipid screen  10/16/2020    Potassium monitoring  10/16/2020    Creatinine monitoring  10/16/2020    Colon cancer screen colonoscopy  12/29/2027    DTaP/Tdap/Td vaccine (2 - Td) 04/11/2029    Pneumococcal 65+ years Vaccine  Completed    AAA screen  Completed    Hepatitis C screen  Completed    Hepatitis A vaccine  Aged Out    Hepatitis B vaccine  Aged Out    Hib vaccine  Aged Out    Meningococcal (ACWY) vaccine  Aged Out       No results found for: LABA1C          ( goal A1C is < 7)   No results found for: LABMICR  LDL Calculated (mg/dL)   Date Value   10/16/2019 115   11/26/2018 111       (goal LDL is <100)   AST (U/L)   Date Value   10/16/2019 16     ALT (U/L)   Date Value   10/16/2019 17     BUN (mg/dL)   Date Value   10/16/2019 12     BP Readings from Last 3 Encounters:   10/11/19 124/70   04/11/19 126/82   10/11/18 120/80          (goal 120/80)    All Future Testing planned in [facility]  Lab Frequency Next Occurrence   CBC Auto Differential Once 10/11/2019   Comprehensive Metabolic Panel Once 77/92/2386   Lipid Panel Once 10/11/2019   TSH with Reflex Once 10/11/2019   Lipid Panel Once 12/16/2019   Hepatic Function Panel Once 12/16/2019               Patient Active Problem List:     History of calcium pyrophosphate deposition disease (CPPD)     Inflammatory arthropathy     Diverticulosis of colon

## 2020-07-07 ENCOUNTER — HOSPITAL ENCOUNTER (OUTPATIENT)
Dept: PHYSICAL THERAPY | Facility: CLINIC | Age: 70
Setting detail: THERAPIES SERIES
Discharge: HOME | End: 2020-07-07
Payer: MEDICARE

## 2020-07-07 PROCEDURE — 97161 PT EVAL LOW COMPLEX 20 MIN: CPT

## 2020-07-07 PROCEDURE — 97140 MANUAL THERAPY 1/> REGIONS: CPT

## 2020-07-07 NOTE — CONSULTS
work:   Work activities/duties -     Pain:  [x] Yes  [] No Location: superior R GHJ, AC joints- intermittent pain   Pain Rating: (0-10 scale) 6/10- at present; 0/10- MIN/over this past week; 10/10- MAX/over this past week/sleeping in recliner  Pain altered Tx:  [] Yes  [x] No  Action: N/A  Symptoms:  [x] Improving [] Worsening [] Same  Better:  [] AM    [] PM    [] Sit    [] Rise/Sit    []Stand    [] Walk    [] Lying    [] Other:  Worse: [] AM    [] PM    [] Sit    [] Rise/Sit    []Stand    [] Walk    [] Lying    [] Bend                      [] Valsalva    [x] Other: all active use of R UE; sleeping. Sleep: [] OK    [x] Disturbed    Two-level DVT Wells Score:    Clinical Feature Points   Active cancer (treatment ongoing, within 6 months, or palliative) 1   Paralysis, paresis or recent plaster immobilization of the lower extremities 1   Recently bedridden for 3 days or more, or major surgery within 12 weeks requiring general or regional anesthesia  1*   Localized tenderness along the distribution of the deep venous system 1   Entire leg swollen 1   Calf swelling 3cm larger than asymptomatic side 1   Pitting edema confined to symptomatic leg 1   Collateral superficial veins (non-varicose) 1   Previously documented DVT 1   An alternative diagnosis is at least as likely as DVT -2*   Clinical probability simplified score Points   DVT likely 2 points or more   DVT unlikely 1 point or less*     Reports cryotherapy at home a few times a day- hour each session- instructed to perform several times a day, for x10-15 minute increments- verbalized understanding to all.      Objective:     ROM  °A/P END FEEL STRENGTH    Left Right  Left Right   Sit Shld Flex 180 145- P; P  4+    Sit Shld Abd 180 170- P; P  4+    Sit Shld IR WNL 75- P < 45 deg ABD   4+    Shoulder Flex        Ext        ABD        ER @ 0 45 90 WNL in sit 65- P < 45 deg ABD   4+    IR        Supraspinatus        Infraspinatus        Serratus Ant        Pectoralis Lats        Mid Trap        Lower Trap        Elbow Flex. Elbow Ext. Pronation        Supination        Wrist Flex. Wrist Ext. Rad. Dev. Ulnar Dev.           1-4/10 P at end-range R shoulder PROM FLEX  Denies P with scaption, IR, ER PROM  R shoulder MMT not tested d/t post-surgical status     IR behind-the-back: T12- L; not even L5/S1- R, with pain/apprehension      OBSERVATION No Deficit Deficit Not Tested Comments   Forward Head [] [x] []    Rounded Shoulders [] [x] []    Kyphosis [x] [] []    Scap Height/Position [x] [] []    Winging [] [x] []    SH Rhythm [] [x] [] D/t lack of R shoulder AROM. INSPECTION/PALPATION    Denies TTP all- except surgical incision nearest acromion process. No signs/symptoms of infection of surgical incisions. SC/AC Joint [] [] []    Supraspinatus [] [] []    Biceps tendon/groove [] [] []    Posterior shld [] [] []    Subscapularis [] [] []      Functional Test: UEFS Score: 33% functionally impaired    (27/80)     Comments: N/A    Assessment:    Patient is a 65 y.o. pleasant male who presents to physical therapy initial evaluation with signs and symptoms consistent with s/p R RTC, biceps on 6/23/2020. Patient demonstrates impairments and symptoms as detailed below in therapy goals. Patient currently limited in all active use of R UE secondary to these impairments and increased symptoms. Patient would benefit from continued physical therapy services in order to address these impairments and symptoms, and return to QOL, ADLs, work. Anticipated frequency detailed above. Prognosis not limited d/t secondary factors- justifying a low complexity evaluation. If unable to achieve significant improvements within six to twelve visits, will consult referring physician and consider a follow-up visit with said physician. Patient verbalized understanding and agreement to all aforementioned statements.     Patient would benefit from skilled physical therapy services in order to: in order to improve R shoulder A/PROM; R shoulder strength; and overall function of R UE. Problems:    [x] ? Pain:  [x] ? ROM:  [x] ? Strength:  [x] ? Function:  [] Other:      STG: (to be met in 10 treatments)  1. ? Pain: Patient will report < 6/10 pain at worst over week in order to improve QOL. 2. ? ROM: Will demo R shoulder PROM FLEX, SCAPTION, ER to equal L and with < 3/10 P in order to improve functional mobility. 3. ? Strength: Will demo 4/5 gross R shoulder MMT with < 3/10 P in order to better match L and improve light ADLs through day. 4. ? Function: Will report improved tolerance for sleeping without sling/ABD pillow- with pain < 4/10 and dec frequency of waking- for improved healing of surgical site and tolerance to PT services. 5. Patient to be independent with home exercise program as demonstrated by performance with correct form without cues. LTG: (to be met in 24 treatments)  1. Will demo R shoulder AROM to equal L and with < 3/10 P in order to improve functional reaching. 2. Will demo 4+/5 gross R shoulder strength in order to better match L and improve functional lifting. 3. Improve UEFS to 60/80              Patient goals: \"to be able to golf again. \"     Rehab Potential:  [x] Good  [] Fair  [] Poor   Suggested Professional Referral:  [x] No  [] Yes:  Barriers to Goal Achievement:  [x] No  [] Yes:  Domestic Concerns:  [x] No  [] Yes:    Pt. Education:  [x] Plans/Goals, Risks/Benefits discussed  [x] Home exercise program  Method of Education: [x] Verbal  [x] Demo  [x] Written  Comprehension of Education:  [x] Verbalizes understanding. [] Demonstrates understanding. [x] Needs Review. [] Demonstrates/verbalizes understanding of HEP/Ed previously given.     Treatment Plan:  [x] Therapeutic Exercise   73596  [] Iontophoresis: 4 mg/mL Dexamethasone Sodium Phosphate  mAmin  84052   [] Therapeutic Activity  03730 [x] Vasopneumatic cold with Treatment Charges: Mins Units   [x] Evaluation       [x]  Low       []  Moderate       []  High 20 1   []  Modalities     []  Ther Exercise     [x]  Manual Therapy 20 1   []  Ther Activities     []  Aquatics     []  Vasocompression     []  Other       TOTAL TREATMENT TIME: 40    Time in: 9:05AM    Time Out: 9:45AM    Electronically signed by: Vazquez Yen PT    Physician Signature:________________________________Date:__________________  By signing above or cosigning this note, I have reviewed this plan of care and certify a need for medically necessary rehabilitation services.      *PLEASE SIGN ABOVE AND FAX BACK ALL PAGES*

## 2020-07-14 ENCOUNTER — HOSPITAL ENCOUNTER (OUTPATIENT)
Dept: PHYSICAL THERAPY | Facility: CLINIC | Age: 70
Setting detail: THERAPIES SERIES
Discharge: HOME | End: 2020-07-14
Payer: MEDICARE

## 2020-07-14 PROCEDURE — 97140 MANUAL THERAPY 1/> REGIONS: CPT

## 2020-07-14 PROCEDURE — 97016 VASOPNEUMATIC DEVICE THERAPY: CPT

## 2020-07-14 NOTE — FLOWSHEET NOTE
[] [facility]  [address]  [phone]  F: [phone] [] [address]  [facility]   [address]  P: [phone]  F: [phone] [x] [address] &  Therapy  [address] [facility]  P: [phone]  F: [phone] [] [address]  [facility]   [address]   [place]: [phone]  F: [phone]      Physical Therapy Daily Treatment Note    Date:  2020  Patient Name:  Hamilton Garcia    :  [birth year]  MRN: [id number]  Physician: Siddhartha [address] Street: Medicare railroad- based on medical necessity   Medical Diagnosis: M75.01- right rotator cuff tear               Rehab Codes: M75.01  Onset Date: S/p R RTC, biceps repair on 2020                        Next 's appt: 8/3/2020  Visit# / total visits: ; Progress note for Medicare patient due at visit 10     Cancels/No Shows: 0/0    Subjective:    Pain:  [x] Yes  [] No Location: anterior R shoulder Pain Rating: (0-10 scale)  2-3/10  Pain altered Tx:  [x] No  [x] Yes  Action: N/A  Comments: Reports inc soreness within R shoulder last PM with sleeping with sling and ABD pillow- unable to get comfortable. However, reports symptoms resolved and denies pain at present. Reports plans to sleep without ABD pillow- instructed to clear with referring surgeon first- verbalized understanding to all. Reports good tolerance to initial evaluation and current HEP interventions issued.      Todays Treatment:  Modalities: vaso, CP- PRN  Precautions: s/p R RTC, biceps repair- 2020; sling without ABD pillow until 2020; PROM ER/IR in scapular plane to start; towel under humerus in supine; FOLLOW PROTOCOL ON BETO'S DESK     Exercises: issued AAROM pendulums and FLEX in bent over position per referring surgeon and finally R shoulder PROM- keeping ER/IR in the scapular plane- reports only trace P at end-ranges. Finished with vaso for pain management. Consider ideas above and below next visit. [] No change. [x] Other:  [x] Patient would continue to benefit from skilled physical therapy services in order to: in order to improve R shoulder A/PROM; R shoulder strength; and overall function of R UE.    STG: (to be met in 10 treatments)  1. ? Pain: Patient will report < 6/10 pain at worst over week in order to improve QOL. 2. ? ROM: Will demo R shoulder PROM FLEX, SCAPTION, ER to equal L and with < 3/10 P in order to improve functional mobility. 3. ? Strength: Will demo 4/5 gross R shoulder MMT with < 3/10 P in order to better match L and improve light ADLs through day. 4. ? Function: Will report improved tolerance for sleeping without sling/ABD pillow- with pain < 4/10 and dec frequency of waking- for improved healing of surgical site and tolerance to PT services. 5. Patient to be independent with home exercise program as demonstrated by performance with correct form without cues. LTG: (to be met in 24 treatments)  1. Will demo R shoulder AROM to equal L and with < 3/10 P in order to improve functional reaching. 2. Will demo 4+/5 gross R shoulder strength in order to better match L and improve functional lifting. 3. Improve UEFS to 60/80              Patient goals: \"to be able to golf again. \"     Pt. Education:  [x] Yes  [] No  [] Reviewed Prior HEP/Ed  Method of Education: [x] Verbal  [x] Demo  [] Written  Comprehension of Education:  [] Verbalizes understanding. [] Demonstrates understanding. [] Needs review. [x] Demonstrates/verbalizes HEP/Ed previously given. 7/14/2020- See subjective and grid above. Plan: [x] Continue current frequency toward long and short term goals. [x] Specific Instructions for subsequent treatments:  Follow-up with pt regarding tolerance to MT techniques- modify PRN and continue if proven beneficial. May begin gentle AAROM table-slides > 4 weeks post surgery.       Time In: 2:02PM           Time Out: 3:05PM    Electronically signed by:  Sae Gomez PT

## 2020-07-16 ENCOUNTER — HOSPITAL ENCOUNTER (OUTPATIENT)
Dept: PHYSICAL THERAPY | Facility: CLINIC | Age: 70
Setting detail: THERAPIES SERIES
Discharge: HOME | End: 2020-07-16
Payer: MEDICARE

## 2020-07-16 PROCEDURE — 97110 THERAPEUTIC EXERCISES: CPT

## 2020-07-16 PROCEDURE — 97140 MANUAL THERAPY 1/> REGIONS: CPT

## 2020-07-16 PROCEDURE — 97016 VASOPNEUMATIC DEVICE THERAPY: CPT

## 2020-07-16 NOTE — FLOWSHEET NOTE
[] [facility]) - [facility] &  Therapy  [address]  [phone]  F: [phone] [] [address]  [facility] [address]   [address]  P: [phone]  F: [phone] [x] [address] &  Therapy  [address] [facility]  P: [phone]  F: [phone] [] [address]  [facility]   [address]   [place]: [phone]  F: [phone]      Physical Therapy Daily Treatment Note    Date:  2020  Patient Name:  Rufino Mendoza    :    MRN: [id number]  Physician: [address] [place] [address] Street: Medicare railroad- based on medical necessity   Medical Diagnosis: M75.01- right rotator cuff tear               Rehab Codes: M75.01  Onset Date: S/p R RTC, biceps repair on 2020                        Next 's appt: 8/3/2020  Visit# / total visits: 3/24; Progress note for Medicare patient due at visit 10     Cancels/No Shows: 0/0    Subjective:    Pain:  [x] Yes  [] No Location: anterior R shoulder Pain Rating: (0-10 scale)  5/10  Pain altered Tx:  [] No  [x] Yes  Action: Ended w/ Vaso  Comments: Noted pn upon arrival to therapy, 5/10. Pt also noted a \"pinching\" at end range in all planes.   Todays Treatment:  Modalities: vaso, CP- PRN  Precautions: s/p R RTC, biceps repair- 2020; sling without ABD pillow until 2020; PROM ER/IR in scapular plane to start; towel under humerus in supine; FOLLOW PROTOCOL ON BETO'S DESK     Exercises: issued AAROM pendulums and FLEX in bent over position per referring surgeon   Exercise Reps/ Time Weight/ Level Comments   Education  2x10 2 sec  tennis ball AROM R wrist- FLEX/EXT/ULNAR/RAD DEV; and also ball gripping- all within sling; also reduce ice to x10-15 minute icrements- verbalized understanding to all; HEP   Education   Instructed to clear with MD prior to 3/10 P in order to improve functional mobility. 3. ? Strength: Will demo 4/5 gross R shoulder MMT with < 3/10 P in order to better match L and improve light ADLs through day. 4. ? Function: Will report improved tolerance for sleeping without sling/ABD pillow- with pain < 4/10 and dec frequency of waking- for improved healing of surgical site and tolerance to PT services. 5. Patient to be independent with home exercise program as demonstrated by performance with correct form without cues. LTG: (to be met in 24 treatments)  1. Will demo R shoulder AROM to equal L and with < 3/10 P in order to improve functional reaching. 2. Will demo 4+/5 gross R shoulder strength in order to better match L and improve functional lifting. 3. Improve UEFS to 60/80              Patient goals: \"to be able to golf again. \"     Pt. Education:  [x] Yes  [] No  [] Reviewed Prior HEP/Ed  Method of Education: [x] Verbal  [x] Demo  [] Written   Comprehension of Education:  [] Verbalizes understanding. [] Demonstrates understanding. [] Needs review. [x] Demonstrates/verbalizes HEP/Ed previously given. 7/14/2020- See subjective and grid above. Plan: [x] Continue current frequency toward long and short term goals. Cont to manage pn and introduce more AAROM exercises. [x] Specific Instructions for subsequent treatments: Follow-up with pt regarding tolerance to MT techniques- modify PRN and continue if proven beneficial. May begin gentle AAROM table-slides > 4 weeks post surgery.       Time In: 2:00PM           Time Out: 2:55PM    Electronically signed by:  Kai Homans, PTA

## 2020-07-21 ENCOUNTER — HOSPITAL ENCOUNTER (OUTPATIENT)
Dept: PHYSICAL THERAPY | Facility: CLINIC | Age: 70
Setting detail: THERAPIES SERIES
Discharge: HOME | End: 2020-07-21
Payer: MEDICARE

## 2020-07-21 PROCEDURE — 97110 THERAPEUTIC EXERCISES: CPT

## 2020-07-21 PROCEDURE — 97140 MANUAL THERAPY 1/> REGIONS: CPT

## 2020-07-21 PROCEDURE — 97016 VASOPNEUMATIC DEVICE THERAPY: CPT

## 2020-07-21 NOTE — FLOWSHEET NOTE
[] [facility]) - [facility] &  Therapy  [address]  [phone]  F: [phone] [] [address]  [place] [address]   [address]  P: [phone]  F: [phone] [x] [address] &  Therapy  [address] [facility]  P: [phone]  F: [phone] [] [address]  [facility]   [address]   [place]: [phone]  F: [phone]      Physical Therapy Daily Treatment Note    Date:  2020  Patient Name:  Shiv Murguia    :  [age]  MRN: [id number]  Physician: [address] [place] [address] Street: Medicare railroad- based on medical necessity   Medical Diagnosis: M75.01- right rotator cuff tear               Rehab Codes: M75.01  Onset Date: S/p R RTC, biceps repair on 2020                        Next 's appt: 8/3/2020  Visit# / total visits: ; Progress note for Medicare patient due at visit 10     Cancels/No Shows: 0/0    Subjective:    Pain:  [x] Yes  [] No Location: anterior R shoulder Pain Rating: (0-10 scale)  4/10  Pain altered Tx:  [] No  [x] Yes  Action: Ended w/ Vaso  Comments: States his pain level has been consistently about a 4/10 lately. Has been having trouble sleeping, wakes up every 2-3 hours d/t pain.    Todays Treatment:  Modalities: vaso, CP- PRN  Precautions: s/p R RTC, biceps repair- 2020; sling without ABD pillow until 2020; PROM ER/IR in scapular plane to start; towel under humerus in supine; FOLLOW PROTOCOL ON BETO'S DESK     Exercises: issued AAROM pendulums and FLEX in bent over position per referring surgeon   Exercise Reps/ Time Weight/ Level Comments   Education  2x10 2 sec  tennis ball AROM R wrist- FLEX/EXT/ULNAR/RAD DEV; and also ball gripping- all within sling; also reduce ice to x10-15 minute icrements- verbalized understanding to all; HEP   Education worst over week in order to improve QOL. 2. ? ROM: Will demo R shoulder PROM FLEX, SCAPTION, ER to equal L and with < 3/10 P in order to improve functional mobility. 3. ? Strength: Will demo 4/5 gross R shoulder MMT with < 3/10 P in order to better match L and improve light ADLs through day. 4. ? Function: Will report improved tolerance for sleeping without sling/ABD pillow- with pain < 4/10 and dec frequency of waking- for improved healing of surgical site and tolerance to PT services. 5. Patient to be independent with home exercise program as demonstrated by performance with correct form without cues. LTG: (to be met in 24 treatments)  1. Will demo R shoulder AROM to equal L and with < 3/10 P in order to improve functional reaching. 2. Will demo 4+/5 gross R shoulder strength in order to better match L and improve functional lifting. 3. Improve UEFS to 60/80              Patient goals: \"to be able to golf again. \"     Pt. Education:  [] Yes  [] No  [] Reviewed Prior HEP/Ed  Method of Education: [] Verbal  [] Demo  [] Written   Comprehension of Education:  [] Verbalizes understanding. [] Demonstrates understanding. [] Needs review. [] Demonstrates/verbalizes HEP/Ed previously given. 7/14/2020- See subjective and grid above. Plan: [x] Continue current frequency toward long and short term goals. Cont to manage pn and introduce more AAROM exercises.   [x] Specific Instructions for subsequent treatments:       Time In: 10:02 am        Time Out: 11:00 am    Electronically signed by:  Dominic Sow PTA

## 2020-07-23 ENCOUNTER — HOSPITAL ENCOUNTER (OUTPATIENT)
Dept: PHYSICAL THERAPY | Facility: CLINIC | Age: 70
Setting detail: THERAPIES SERIES
Discharge: HOME | End: 2020-07-23
Payer: MEDICARE

## 2020-07-23 PROCEDURE — 97110 THERAPEUTIC EXERCISES: CPT

## 2020-07-23 NOTE — FLOWSHEET NOTE
[] [facility]) - [facility] &  Therapy  [address]  [phone]  F: [phone] [] [address]  [facility]   [address]  P: [phone]  F: [phone] [x] [address] &  Therapy  [address] [facility]  P: [phone]  F: [phone] [] [address]  [facility]   [address]   [place]: [phone]  F: [phone]      Physical Therapy Daily Treatment Note    Date:  2020  Patient Name:  Juju Ponce    :  [birth year]  MRN: [id number]  Physician: [address] [place] [address] Street: Medicare railroad- based on medical necessity   Medical Diagnosis: M75.01- right rotator cuff tear               Rehab Codes: M75.01  Onset Date: S/p R RTC, biceps repair on 2020                        Next 's appt: 8/3/2020  Visit# / total visits: ; Progress note for Medicare patient due at visit 10     Cancels/No Shows: 0/0    Subjective:    Pain:  [x] Yes  [] No Location: anterior R shoulder Pain Rating: (0-10 scale)  2/10  Pain altered Tx:  [x] No  [] Yes  Action: N/A  Comments: Reports good tolerance to last, progressed treatment session- denies inc P or edema. Reports intermittent difficulty sleeping- instructed to continue with sling and ABD pillow per referring surgeon at this time. Todays Treatment:  Modalities: CP- R shoulder in sitting x10' post treatment session   Precautions: s/p R RTC, biceps repair [potential]- 2020; sling without ABD pillow until 2020; PROM ER/IR in scapular plane to start; towel under humerus in supine; FOLLOW PROTOCOL IN MEDIA TAB!    Exercises: issued AAROM pendulums and FLEX in bent over position per referring surgeon   Exercise Reps/ Time Weight/ Level Comments   Education 2x10 2 sec Tennis ball AROM R wrist- FLEX/EXT/ULNAR/RAD DEV; and also ball gripping- all within sling; also reduce ice to x10-15 minute icrements- verbalized understanding to all; HEP   Education   Instructed to clear with MD prior to sleeping with shoulder ABD pillow- verbalized understanding to all. Scapular retraction, depression X10, 5\" ea   Cues to prevent excessive R shoulder extension motion- able to self-correct     Cues to prevent excessive shoulder elevation between each rep of depression    Table Slides- AAROM  x20 ea  Flexion, scaption, abduction   Supine SA punches  x20, 5\" 0# HEP   AAROM supine wand flexion, ER/IR    AAROM standing wand ABD x20, 5\" ea   Denies P  Towel to support R humerus during IR/ER- cues    ABD to 90 only- cues  Perform in mirror next    HEP   AAROM pulleys x2' ea   FLEX/ABD  Cues for inc hold duration at end-ranges  SLOW   Wall-slides FLEX/SCAPT X10, 5\" ea   With towel  FLEX- B UEs  SCAPT- R only     HEP   HEP education   SEE BELOW- 7/23/2020                               Other: Tiffany Buckley is completed. D/C VASO AND PROM     7/23/2020  PROM  FULL ROM R SHOULDER PROM IN ALL MOTIONS, EXCEPT 50 DEG ER  REPORTS ONLY STRETCH SENSATION AT END-RANGE FLEXION, ER    7/21/2020  PROM  FLEX/SCAPT PROM near 145-155 deg with trace P at end-ranges; no muscle guard   ER/IR < 45 deg ABD in scapular plane- 45 deg ER, 30 deg IR with trace P at end-ranges; no muscle guard    7/16/2020  PROM  Flexion 155    IR 64  ER 75     Specific Instructions for next treatment: Follow-up with pt regarding tolerance to new, updated HEP handout- modify PRN. Consider SB wall roll-ups flexion; supine light TB B HABD and B ER; and TB resisted rows, shoulder extensions next visit. May D/C sling- except for sleeping- starting next week. No true RTC strengthening until x6 weeks.      Treatment Charges: Mins Units   [x]  Modalities- CP 10 0   [x]  Ther Exercise 45 3   []  Manual Therapy     []  Ther Activities     []  Aquatics     []  Vasocompression     []  Other     Total Treatment time 45 3     Assessment: [x] Progressing toward goals. Pt presents with trace R shoulder pain, and reports good tolerance to last, progressed treatment session. Therefore, began session with quick re-assessment of R shoulder PROM- demos full ROM of each motion, except 50 deg ER. Reports only tightness at end-range flexion, ER. Proceeded with review of AAROM supine wand flexion- performs with ease- and therefore, began wand ER/IR; supine SA punches; standing wand ABD; AAROM pulleys ABD; and wall-slides FLEX/SCAPT this date. Denies pain with all new interventions, however, demos inc challenge with standing wand ABD at approx. 90 deg- inc R UT compensation. Issued all for HEP. Finished with CP this date- D/C vaso and PROM next visit. Consider ideas above and below next visit. [] No change. [x] Other:  [x] Patient would continue to benefit from skilled physical therapy services in order to: in order to improve R shoulder A/PROM; R shoulder strength; and overall function of R UE.    STG: (to be met in 10 treatments)  1. ? Pain: Patient will report < 6/10 pain at worst over week in order to improve QOL. 2. ? ROM: Will demo R shoulder PROM FLEX, SCAPTION, ER to equal L and with < 3/10 P in order to improve functional mobility. 3. ? Strength: Will demo 4/5 gross R shoulder MMT with < 3/10 P in order to better match L and improve light ADLs through day. 4. ? Function: Will report improved tolerance for sleeping without sling/ABD pillow- with pain < 4/10 and dec frequency of waking- for improved healing of surgical site and tolerance to PT services. 5. Patient to be independent with home exercise program as demonstrated by performance with correct form without cues. LTG: (to be met in 24 treatments)  1. Will demo R shoulder AROM to equal L and with < 3/10 P in order to improve functional reaching. 2. Will demo 4+/5 gross R shoulder strength in order to better match L and improve functional lifting.    3. Improve UEFS to 60/80              Patient goals: \"to be able to golf again. \"     Pt. Education:  [x] Yes  [] No  [] Reviewed Prior HEP/Ed  Method of Education: [x] Verbal  [x] Demo  [x] Written   Comprehension of Education:  [x] Verbalizes understanding. [] Demonstrates understanding. [] Needs review. [] Demonstrates/verbalizes HEP/Ed previously given. 7/14/2020- See subjective and grid above. 7/23/2020- All above in grid labeled as HEP issued for home- verbalized understanding to all. Issued new, updated HEP handout. Plan: [x] Continue current frequency toward long and short term goals. Cont to manage pn and introduce more AAROM exercises. [x] Specific Instructions for subsequent treatments: Follow-up with pt regarding tolerance to new, updated HEP handout- modify PRN. Consider SB wall roll-ups flexion; supine light TB B HABD and B ER; and TB resisted rows, shoulder extensions next visit. May D/C sling- except for sleeping- starting next week. No true RTC strengthening until x6 weeks.        Time In: 10:05AM        Time Out: 11:00AM    Electronically signed by:  Kavita Worley, PT

## 2020-07-27 ENCOUNTER — HOSPITAL ENCOUNTER (OUTPATIENT)
Dept: PHYSICAL THERAPY | Facility: CLINIC | Age: 70
Setting detail: THERAPIES SERIES
Discharge: HOME | End: 2020-07-27
Payer: MEDICARE

## 2020-07-27 PROCEDURE — 97110 THERAPEUTIC EXERCISES: CPT

## 2020-07-27 NOTE — FLOWSHEET NOTE
[] [facility]) - [facility] &  Therapy  [address]  [phone]  F: [phone] [] [address]  [facility] [address]   [address]  P: [phone]  F: [phone] [x] [address] &  Therapy  [address] [facility]  P: [phone]  F: [phone] [] [address]  [facility]   [address]   [place]: [phone]  F: [phone]      Physical Therapy Daily Treatment Note    Date:  2020  Patient Name:  Fawad Quinonez    :  [age]  MRN: [id number]  Physician: [address] [place] [address] Street: Medicare railroad- based on medical necessity   Medical Diagnosis: M75.01- right rotator cuff tear               Rehab Codes: M75.01  Onset Date: S/p R RTC, biceps repair on 2020                        Next 's appt: 8/3/2020  Visit# / total visits: ; Progress note for Medicare patient due at visit 10     Cancels/No Shows: 0/0    Subjective:    Pain:  [x] Yes  [] No Location: anterior R shoulder Pain Rating: (0-10 scale)  4/10  Pain altered Tx:  [x] No  [] Yes  Action: N/A  Comments: Pt reports overall feeling good however, certain motions cause pn. Todays Treatment:  Modalities: CP- R shoulder in sitting x10' post treatment session   Precautions: s/p R RTC, biceps repair [potential]- 2020; sling without ABD pillow until 2020; PROM ER/IR in scapular plane to start; towel under humerus in supine; FOLLOW PROTOCOL IN MEDIA TAB!    Exercises: issued AAROM pendulums and FLEX in bent over position per referring surgeon   Exercise Reps/ Time Weight/ Level Comments   Education 2x10 2 sec Tennis ball AROM R wrist- FLEX/EXT/ULNAR/RAD DEV; and also ball gripping- all within sling; also reduce ice to x10-15 minute icrements- verbalized understanding to all; HEP   Education   Instructed to clear with MD prior to sleeping with shoulder ABD pillow- verbalized understanding to all. Scapular retraction, depression X10, 5\" ea   Cues to prevent excessive R shoulder extension motion- able to self-correct     Cues to prevent excessive shoulder elevation between each rep of depression    Table Slides- AAROM  x20 ea  Flexion, scaption, abduction   Supine SA punches  x20, 5\" 0# HEP   AAROM supine wand flexion, ER/IR    AAROM standing wand ABD x20, 5\" ea   Denies P  Pt reported pn with flex, scap, abduction with wand in standing. Pt instructed to stop range before feeling pn.   AAROM pulleys x2' ea   FLEX/ABD  Cues for inc hold duration at end-ranges  SLOW   Wall-slides FLEX/SCAPT X10, 5\" ea   With towel  FLEX- B UEs  SCAPT- R only     HEP   HEP education   Pt reports compliance with HEP   AAROM SB on table 20x  No pn noted by pt.           Other: Mitali Officer is completed. D/C VASO AND PROM      7/23/2020  PROM  FULL ROM R SHOULDER PROM IN ALL MOTIONS, EXCEPT 50 DEG ER  REPORTS ONLY STRETCH SENSATION AT END-RANGE FLEXION, ER    7/21/2020  PROM  FLEX/SCAPT PROM near 145-155 deg with trace P at end-ranges; no muscle guard   ER/IR < 45 deg ABD in scapular plane- 45 deg ER, 30 deg IR with trace P at end-ranges; no muscle guard  7/16/2020  PROM  Flexion 155    IR 64  ER 75     Specific Instructions for next treatment: Follow-up with pt regarding tolerance to new, updated HEP handout- modify PRN. Supine light TB B HABD and B ER; and TB resisted rows, shoulder extensions next visit. (Inform pt that he may D/C sling- except for sleeping next visit) No true RTC strengthening until x6 weeks (8/4/2020) per protocol. Treatment Charges: Mins Units   [x]  Modalities- CP 10 0   [x]  Ther Exercise 45 3   []  Manual Therapy     []  Ther Activities     []  Aquatics     []  Vasocompression     []  Other     Total Treatment time 45 3     Assessment: [x] Progressing toward goals.  Pt demonstrates upper trap guarding with flex, scap, abd, VC during standing AROM aided pt in better scapulothoracic rythym. Pt finds pn and difficulty with shoulder motions during wand assist ex's due to weakness. Held progression of exercises as recommended by PT last visit due to c/o pain with motion and \"cluncking\" senstaion reported by pt with AROM in standing. Plan to attempt next visit if able. Pt notes pn intensity remains 4/10 however, pn frequency has decreased. Pt received CP post therapy for inflammation control. [] No change. [x] Other:  [x] Patient would continue to benefit from skilled physical therapy services in order to: in order to improve R shoulder A/PROM; R shoulder strength; and overall function of R UE.    STG: (to be met in 10 treatments)  1. ? Pain: Patient will report < 6/10 pain at worst over week in order to improve QOL. 2. ? ROM: Will demo R shoulder PROM FLEX, SCAPTION, ER to equal L and with < 3/10 P in order to improve functional mobility. 3. ? Strength: Will demo 4/5 gross R shoulder MMT with < 3/10 P in order to better match L and improve light ADLs through day. 4. ? Function: Will report improved tolerance for sleeping without sling/ABD pillow- with pain < 4/10 and dec frequency of waking- for improved healing of surgical site and tolerance to PT services. 5. Patient to be independent with home exercise program as demonstrated by performance with correct form without cues. LTG: (to be met in 24 treatments)  1. Will demo R shoulder AROM to equal L and with < 3/10 P in order to improve functional reaching. 2. Will demo 4+/5 gross R shoulder strength in order to better match L and improve functional lifting. 3. Improve UEFS to 60/80              Patient goals: \"to be able to golf again. \"     Pt. Education:  [x] Yes  [] No  [] Reviewed Prior HEP/Ed   Method of Education: [x] Verbal  [x] Demo  [x] Written   Comprehension of Education:  [x] Verbalizes understanding. [] Demonstrates understanding.   [] Needs review. [] Demonstrates/verbalizes HEP/Ed previously given. 7/14/2020- See subjective and grid above. 7/23/2020- All above in grid labeled as HEP issued for home- verbalized understanding to all. Issued new, updated HEP handout. Plan: [x] Continue current frequency toward long and short term goals. Cont to manage pn and introduce more AAROM exercises. [x] Specific Instructions for subsequent treatments: Follow-up with pt regarding tolerance to new, updated HEP handout- modify PRN. Consider SB wall roll-ups flexion; supine light TB B HABD and B ER; and TB resisted rows, shoulder extensions next visit. May D/C sling- except for sleeping- starting next week. No true RTC strengthening until x6 weeks.        Time In: 11:00AM        Time Out: 12:00AM    Electronically signed by:  Satya Maldonado PTA

## 2020-07-29 ENCOUNTER — HOSPITAL ENCOUNTER (OUTPATIENT)
Dept: PHYSICAL THERAPY | Facility: CLINIC | Age: 70
Setting detail: THERAPIES SERIES
Discharge: HOME | End: 2020-07-29
Payer: MEDICARE

## 2020-07-29 PROCEDURE — 97110 THERAPEUTIC EXERCISES: CPT

## 2020-07-29 NOTE — FLOWSHEET NOTE
[] [facility]) - [facility] &  Therapy  [address]  [phone]  F: [phone] [] [address]  [facility] 36   [address]  P: [phone]  F: [phone] [x] [address] &  Therapy  [address] [facility]  P: [phone]  F: [phone] [] [address]  [facility]   [address]   [place]: [phone]  F: [phone]      Physical Therapy Daily Treatment Note    Date:  2020  Patient Name:  Alyssa Calderón    :  [age]  MRN: [id number]  Physician: [address] [place] [address] Street: Medicare railroad- based on medical necessity   Medical Diagnosis: M75.01- right rotator cuff tear               Rehab Codes: M75.01  Onset Date: S/p R RTC, biceps repair on 2020                        Next 's appt: 8/3/2020  Visit# / total visits: ; Progress note for Medicare patient due at visit 10     Cancels/No Shows: 0/0    Subjective:    Pain:  [x] Yes  [] No Location: anterior R shoulder Pain Rating: (0-10 scale)  4/10  Pain altered Tx:  [x] No  [] Yes  Action: N/A  Comments: Pt reports no true \"pain\" of R shoulder just continued soreness. Todays Treatment:  Modalities: CP- R shoulder in sitting x10' post treatment session   Precautions: s/p R RTC, biceps repair [potential]- 2020; sling without ABD pillow until 2020; PROM ER/IR in scapular plane to start; towel under humerus in supine; FOLLOW PROTOCOL IN MEDIA TAB!    Exercises:    Exercise Reps/ Time Weight/ Level Comments   UBE 7'     Pulleys 2' ea  Flexion, abduction         Scapular retraction, depression 20x, 5\" ea      Table Slides- AAROM  20x ea  Flexion, scaption, abduction   Supine SA punches  20x, 5\" 0# HEP   AAROM standing wand flexion, ER/IR   wand ABD 20x, 5\" ea   Denies P  Pt reported pn with flex, scap, abduction with wand in standing. Pt instructed to stop range before feeling pn. Supine Tband HAB 20x orange    Supine Tband Bilat ER 20x orange          Wall-slides FLEX/SCAPT X20, 5\" ea   With towel  FLEX- B UEs     AAROM SB on wall 20x  Hold at top for stretch         Tband Rows 20x ea Blue    Tband shoulder Ext 20x ea Blue              Other: Verneice Magdaleno is completed. D/C VASO AND PROM      7/23/2020  PROM  FULL ROM R SHOULDER PROM IN ALL MOTIONS, EXCEPT 50 DEG ER  REPORTS ONLY STRETCH SENSATION AT END-RANGE FLEXION, ER    7/21/2020  PROM  FLEX/SCAPT PROM near 145-155 deg with trace P at end-ranges; no muscle guard   ER/IR < 45 deg ABD in scapular plane- 45 deg ER, 30 deg IR with trace P at end-ranges; no muscle guard  7/16/2020  PROM  Flexion 155    IR 64  ER 75     Specific Instructions for next treatment:  No true RTC strengthening until x6 weeks (8/4/2020) per protocol. Treatment Charges: Mins Units   []  Modalities- CP     [x]  Ther Exercise 45 3   []  Manual Therapy     []  Ther Activities     []  Aquatics     []  Vasocompression     []  Other     Total Treatment time 45 3     Assessment: [x] Progressing toward goals. Good tolerance to bolded ex completed per chart above. Wand ex completed in standing this date with continued pain with abduction only but decreased with rest. Able to added tband resisted ex for scapular strengthening with no pain per pt. Pt denying pain with all motions passively just \"stretch. \" Declined CP this date. [] No change. [x] Other:  [x] Patient would continue to benefit from skilled physical therapy services in order to: in order to improve R shoulder A/PROM; R shoulder strength; and overall function of R UE.    STG: (to be met in 10 treatments)  1. ? Pain: Patient will report < 6/10 pain at worst over week in order to improve QOL. 2. ? ROM: Will demo R shoulder PROM FLEX, SCAPTION, ER to equal L and with < 3/10 P in order to improve functional mobility.    3. ? Strength:

## 2020-08-04 ENCOUNTER — HOSPITAL ENCOUNTER (OUTPATIENT)
Dept: PHYSICAL THERAPY | Facility: CLINIC | Age: 70
Setting detail: THERAPIES SERIES
Discharge: HOME | End: 2020-08-04
Payer: MEDICARE

## 2020-08-04 PROCEDURE — 97110 THERAPEUTIC EXERCISES: CPT

## 2020-08-04 NOTE — FLOWSHEET NOTE
MEDIA TAB   Exercises: no lifting greater than coffee cup- 8/4/2020    Exercise Reps/ Time Weight/ Level Comments   UBE x7' L6 HELD- 8/4/2020- IN USE- MAY RESUME NEXT   AAROM pulleys x2' ea  Flexion, abduction         Scapular retraction, depression 20x, 5\" ea      Table Slides- AAROM  20x ea  Flexion, scaption, abduction   Supine SA punches  20x, 5\" 0# HEP   AAROM standing wand flexion, ER/IR  wand ABD 20x, 5\" ea   Denies P  Pt reported pn with flex, scap, abduction with wand in standing. Pt instructed to stop range before feeling pn. Supine rhythmic stabs 4x30\" ea MOD OP AGAINST PT  ALL DIRECTIONS  DENIES P  TCs for scapular retraction    S/L ER, ABD to 90 deg 2x10 ea 0# Towel roll for RTC blood-supply   Denies P   Supine Tband BHABD 20x ea orange    Supine Tband B ER 20x ea orange    Standing AROM arm-lifts x10 ea 0# FLEX/SCAPT/ABD to 90 deg  P at all end-ranges   Fatigues quickly   In mirror for visual feedback next visit    Wall-slides FLEX/SCAPT x10, 5\" ea   With towel  FLEX- B UEs     SB wall roll-ups X10, 5\" Red With OP at end-range          Tband Rows 20x ea Blue    Tband shoulder Ext 20x ea Blue              Other: Kirit Freeman is completed. D/C VASO AND PROM      7/23/2020  PROM  FULL ROM R SHOULDER PROM IN ALL MOTIONS, EXCEPT 50 DEG ER  REPORTS ONLY STRETCH SENSATION AT END-RANGE FLEXION, ER    7/21/2020  PROM  FLEX/SCAPT PROM near 145-155 deg with trace P at end-ranges; no muscle guard   ER/IR < 45 deg ABD in scapular plane- 45 deg ER, 30 deg IR with trace P at end-ranges; no muscle guard    7/16/2020  PROM  Flexion 155    IR 64  ER 75     Specific Instructions for next treatment: Begin prone single-arm row, extension; and progress resistance of supine B HABD/ER next visit.      Treatment Charges: Mins Units   [x]  Modalities- CP 10 0   [x]  Ther Exercise 48 3   []  Manual Therapy     []  Ther Activities     []  Aquatics     []  Vasocompression     []  Other     Total Treatment time 58 3 gross R shoulder strength in order to better match L and improve functional lifting. 3. Improve UEFS to 60/80-               Patient goals: \"to be able to golf again. \"- PROGRESSING    Pt. Education:  [] Yes  [] No  [] Reviewed Prior HEP/Ed   Method of Education: [] Verbal  [] Demo  [] Written   Comprehension of Education:  [] Verbalizes understanding. [] Demonstrates understanding. [] Needs review. [] Demonstrates/verbalizes HEP/Ed previously given. 7/14/2020- See subjective and grid above. 7/23/2020- All above in grid labeled as HEP issued for home- verbalized understanding to all. Issued new, updated HEP handout. Plan: [x] Continue current frequency toward long and short term goals. [x] Specific Instructions for subsequent treatments: Begin prone single-arm row, extension and progress resistance of supine B HABD/ER next visit.       Time In: 9:02AM      Time Out: 10AM    Electronically signed by:  Dav Edmonds PT

## 2020-08-06 ENCOUNTER — HOSPITAL ENCOUNTER (OUTPATIENT)
Dept: PHYSICAL THERAPY | Facility: CLINIC | Age: 70
Setting detail: THERAPIES SERIES
Discharge: HOME | End: 2020-08-06
Payer: MEDICARE

## 2020-08-06 PROCEDURE — 97016 VASOPNEUMATIC DEVICE THERAPY: CPT

## 2020-08-06 PROCEDURE — 97110 THERAPEUTIC EXERCISES: CPT

## 2020-08-06 NOTE — FLOWSHEET NOTE
[] [facility]) - [facility] &  Therapy  [address]  [phone]  F: [phone] [] [address]  [facility]   [address]  P: [phone]  F: [phone] [x] [address] &  Therapy  [address] [facility]  P: [phone]  F: [phone] [] [address]  [facility]   [address]   [place]: [phone]  F: [phone]      Physical Therapy Daily Treatment Note    Date:  2020  Patient Name:  Agustin Daugherty    :    MRN: [id number]  Physician: [address] [place] [address] Street: Medicare railroad- based on medical necessity   Medical Diagnosis: M75.01- right rotator cuff tear               Rehab Codes: M75.01  Onset Date: S/p R RTC, biceps repair on 2020                        Next 's appt: 8/3/2020  Visit# / total visits: ; Progress note for Medicare patient due at visit 10     Cancels/No Shows: 0/0    Subjective:    Pain:  [x] Yes  [] No Location: anterior R shoulder Pain Rating: (0-10 scale) 5/10  Pain altered Tx:  [x] No  [] Yes  Action: N/A  Comments: Pt reports having difficulty with R shld scaption above 90 deg; abduction and flexion cause minimal pn reported by pt.     Todays Treatment:  Modalities: CP- R shoulder in sitting with pillow support x10' post treatment session   Precautions: s/p R RTC, biceps repair [potential]- 2020; FOLLOW PROTOCOL IN MEDIA TAB   Exercises: no lifting greater than coffee cup- 2020    Exercise Reps/ Time Weight/ Level Comments   UBE x7' L6    AAROM pulleys x2' ea  Flexion, abduction         Scapular retraction, depression 20x, 5\" ea      Table Slides- AAROM  20x ea  Flexion, scaption, abduction   Supine SA punches  20x, 5\" 0# HEP   AAROM standing wand flexion, ER/IR  wand ABD 20x, 5\" ea   Denies P  Pt reported pn with flex, scap, abduction with wand in standing. Pt instructed to stop range before feeling pn. Supine rhythmic stabs 4x30\" ea MOD OP AGAINST PT  ALL DIRECTIONS  DENIES P  TCs for scapular retraction   S/L ER, ABD to 90 deg 2x10 ea 0# Towel roll for RTC blood-supply   Denies P   Seated Tband BHABD 20x ea LIME Attempted supine, completed in sitting   Supine Tband B ER 20x ea LIME    Standing AROM arm-lifts x10 ea 0# FLEX/SCAPT/ABD to 90 deg  P at all end-ranges   Fatigues quickly   In mirror for visual feedback next visit    Wall-slides FLEX/SCAPT x10, 5\" ea   With towel  FLEX- B UEs     SB wall roll-ups X10, 5\" Red With OP at end-range   prone single-arm extension 2x10 0# Pt denied pn- pt noted low difficulty level   Tband Rows 20x ea Blue    Tband shoulder Ext 20x ea Blue     prone single-arm row  2x10  0#  pt denied pn- pt noted low difficulty    Other: Leno Nightingale is completed. D/C PROM      8/6/20  UEFI: 47/80; initial eval score 27/80    7/23/2020  PROM  FULL ROM R SHOULDER PROM IN ALL MOTIONS, EXCEPT 50 DEG ER  REPORTS ONLY STRETCH SENSATION AT END-RANGE FLEXION, ER    7/21/2020  PROM  FLEX/SCAPT PROM near 145-155 deg with trace P at end-ranges; no muscle guard   ER/IR < 45 deg ABD in scapular plane- 45 deg ER, 30 deg IR with trace P at end-ranges; no muscle guard    7/16/2020  PROM  Flexion 155    IR 64  ER 75     Specific Instructions for next treatment:     Treatment Charges: Mins Units   []  Modalities- CP     [x]  Ther Exercise 45 3   []  Manual Therapy     []  Ther Activities     []  Aquatics     [x]  Vasocompression 10 1   []  Other     Total Treatment time 55 4     Assessment: [x] Progressing toward goals. Pt demos improving UEFI sore at 47/80 this date. During PROM to R shld pt felt pn with abducion before 90 Deg. Pt reported no pn in all other motions. SPTA added exercises noted by PT ( Prone extensions and rows) with pt showing good tolerance and noting no pn during the active motion.  Pt cont to demonstrate good ROM and exercise tolerance however, SPTA resumed Vaso d/t pt noting increase in pn overall. Post tx SPTA spoke with pt about pn and pt reported pn being constant yet inconsistent in intensity. Sometimes pn is relatively low, yet can spike to 8/10 for 15 min and then subside, per pt. Pt reported no patterns to his knowledge of exacerbation or yielding of pn. SPTA encouraged pt to start icing at home to aid in pn and inflammation management. [] No change. [x] Other:  [x] Patient would continue to benefit from skilled physical therapy services in order to: in order to improve R shoulder A/PROM; R shoulder strength; and overall function of R UE.    STG: (to be met in 10 treatments)  1. ? Pain: Patient will report < 6/10 pain at worst over week in order to improve QOL.- MET   2. ? ROM: Will demo R shoulder PROM FLEX, SCAPTION, ER to equal L and with < 3/10 P in order to improve functional mobility.- MET   3. ? Strength: Will demo 4/5 gross R shoulder MMT with < 3/10 P in order to better match L and improve light ADLs through day. - MET   4. ? Function: Will report improved tolerance for sleeping without sling/ABD pillow- with pain < 4/10 and dec frequency of waking- for improved healing of surgical site and tolerance to PT services.- NOT MET- only without sling for sleep x1 PM, and reports inc difficulty/pain  5. Patient to be independent with home exercise program as demonstrated by performance with correct form without cues. - MET  LTG: (to be met in 24 treatments)  1. Will demo R shoulder AROM to equal L and with < 3/10 P in order to improve functional reaching.- PROGRESSING-   2. Will demo 4+/5 gross R shoulder strength in order to better match L and improve functional lifting. 3. Improve UEFS to 60/80-               Patient goals: \"to be able to golf again. \"- PROGRESSING    Pt.  Education:  [x] Yes  [] No  [] Reviewed Prior HEP/Ed   Method of Education: [x] Verbal  [] Demo  [] Written   Comprehension of Education:  [x] Verbalizes understanding. [] Demonstrates understanding. [x] Needs review. [] Demonstrates/verbalizes HEP/Ed previously given. 7/14/2020- See subjective and grid above. 7/23/2020- All above in grid labeled as HEP issued for home- verbalized understanding to all. Issued new, updated HEP handout. Plan: [x] Continue current frequency toward long and short term goals. [x] Specific Instructions for subsequent treatments: Cont prone single-arm row, extension and resistance of supine B HABD/ER next visit. Time In: 10:00 AM      Time Out: 11:00 AM    Electronically signed by:  RYAN Palmer   This document was completed by RYAN Palmer,  reviewed and accepted by supervising CI Bernardo Coyne PTA.

## 2020-08-11 ENCOUNTER — HOSPITAL ENCOUNTER (OUTPATIENT)
Dept: PHYSICAL THERAPY | Facility: CLINIC | Age: 70
Setting detail: THERAPIES SERIES
Discharge: HOME | End: 2020-08-11
Payer: MEDICARE

## 2020-08-11 ENCOUNTER — APPOINTMENT (OUTPATIENT)
Dept: PHYSICAL THERAPY | Facility: CLINIC | Age: 70
End: 2020-08-11
Payer: MEDICARE

## 2020-08-11 PROCEDURE — 97016 VASOPNEUMATIC DEVICE THERAPY: CPT

## 2020-08-11 PROCEDURE — 97110 THERAPEUTIC EXERCISES: CPT

## 2020-08-11 NOTE — PROGRESS NOTES
[] [facility]) - [facility] &  Therapy  [address]  [phone]  F: [phone] [] [address]  [place] [address]   [address]  P: [phone]  F: [phone] [x] [address] &  Therapy  [address] [facility]  P: [phone]  F: [phone] [] [address]  [facility]   [address]   [place]: [phone]  F: [phone]      Physical Therapy Progress Note    Date: 2020      Patient: Barrington Severin  :   MRN: [id number]    Physician: Willie Mac               Insurance: Medicare railroad- based on medical necessity   Medical Diagnosis: M75.01- right rotator cuff tear               Rehab Codes: M75.01  Onset Date: S/p R RTC, biceps repair on 2020                        Next 's appt: 2020  Visit# / total visits: 10/24; Progress note for Medicare patient due at visit 24                                  Cancels/No Shows: 0/0    Subjective:    Pain:  [x]? Yes  []? No   Location: anterior R shoulder Pain Rating: (0-10 scale) 2-3/10  Pain altered Tx:  [x]? No  []? Yes  Action: N/A   Comments: Reports sig relief post last visit. No longer experiencing flare-up in symptoms. Good compliance with current HEP interventions. Improved tolerance to standing AAROM wand ABD yesterday- no pain at 90 deg. Assessment:  Pt presents with dec pain, and reports relief post last visit. Reports no longer experiencing flare-up in symptoms, and also improved tolerance for standing AAROM wand ABD to 90 deg at home. Therefore, began postural interventions- including standing AAROM wand extension with scapular retraction and snow angels at wall- demos appropriate ROM and denies inc P.  Also progressed supine B HABD and rhythmic stabs to include performing with inc resistance- reports inc P at start, which

## 2020-08-11 NOTE — FLOWSHEET NOTE
[] [address]  - [facility] &  Therapy  [address]  [phone]  F: [phone] [] [address]  [facility]   [address]  P: [phone]  F: [phone] [x] [address] &  Therapy  1500 [facility]  P: [phone]  F: [phone] [] [address]  [facility]   [address]   [place]: [phone]  F: [phone]      Physical Therapy Daily Treatment Note    Date:  2020  Patient Name:  Sherif Ledesma    :    MRN: [id number]  Physician: [address] [place] [address] Street: Medicare railroad- based on medical necessity   Medical Diagnosis: M75.01- right rotator cuff tear               Rehab Codes: M75.01  Onset Date: S/p R RTC, biceps repair on 2020                        Next 's appt: 2020  Visit# / total visits: 10/24; Progress note for Medicare patient due at visit 24     Cancels/No Shows: 0/0     Subjective:    Pain:  [x] Yes  [] No Location: anterior R shoulder Pain Rating: (0-10 scale) 2-3/10  Pain altered Tx:  [x] No  [] Yes  Action: N/A   Comments: Reports sig relief post last visit. No longer experiencing flare-up in symptoms. Good compliance with current HEP interventions. Improved tolerance to standing AAROM wand ABD yesterday- no pain at 90 deg.      Todays Treatment:  Modalities: CP- R shoulder in sitting with pillow support x10' post treatment session; resumed vaso 2020  Precautions: s/p R RTC, biceps repair [potential]- 2020; FOLLOW PROTOCOL IN MEDIA TAB   Exercises: no lifting greater than coffee cup- 2020    Exercise Reps/ Time Weight/ Level Comments   UBE x7' L7 B UE s   AAROM pulleys x2' ea  Flexion, abduction  HELD- AWAITING NEW    Wand extension 2x10, 5\"  With scapular retraction   Also for active pec, biceps stretching    [facility] x10, 5\"  For active pec stretching   Denies P, even at 90 deg    Scapular retraction, depression 20x, 5\" ea      Table Slides- AAROM  20x ea  Flexion, scaption, abduction   Supine SA punches  20x, 5\" 0# HEP   AAROM standing wand flexion, ER/IR  wand ABD 20x, 5\" ea   Denies P  Pt reported pn with flex, scap, abduction with wand in standing. Pt instructed to stop range before feeling pn. Supine rhythmic stabs 4x30\" ea MOD OP  1.5# AGAINST PT  ALL DIRECTIONS  DENIES P  TCs for scapular retraction   S/L ER, ABD to 90 deg 2x10 ea 0# Towel roll for RTC blood-supply   Denies P  TCs for consistent scapular retraction    Supine Tband BHABD 20x ea BTB 5/10 P at start; quickly resolves    Supine Tband B ER 20x ea LIME HELD- 8/11/2020   Standing AROM arm-lifts x10 ea 0# FLEX/SCAPT/ABD to 90 deg  Only P at end-range ABD- instructed to not push-through     Wall-slides FLEX/SCAPT x10, 5\" ea   With towel  FLEX- B UEs     SB wall roll-ups X10, 5\" Red With OP at end-range   Prone single-arm extension 2x10 0# Pt denied pn- pt noted low difficulty level   Tband Rows 20x ea Blue    Tband shoulder Ext 20x ea Blue    Prone single-arm row 2x10  0# Pt denied pn- pt noted low difficulty    Other: Marry Handsome is completed. D/C PROM     8/6/20  UEFI: 47/80; initial eval score 27/80    7/23/2020  PROM  FULL ROM R SHOULDER PROM IN ALL MOTIONS, EXCEPT 50 DEG ER  REPORTS ONLY STRETCH SENSATION AT END-RANGE FLEXION, ER    7/21/2020  PROM  FLEX/SCAPT PROM near 145-155 deg with trace P at end-ranges; no muscle guard   ER/IR < 45 deg ABD in scapular plane- 45 deg ER, 30 deg IR with trace P at end-ranges; no muscle guard    7/16/2020  PROM  Flexion 155    IR 64  ER 75     Specific Instructions for next treatment: Begin 1-2# resistance with standing arm-lifts; standing ball on wall stabs; and resisted IR/ER iso walk-out next visit.      Treatment Charges: Mins Units   []  Modalities- CP     [x]  Ther Exercise 40 3   []  Manual Therapy     []  Ther Activities     []  Aquatics     [x]  Vasocompression 10 1   []  Other     Total Treatment time 50 4     [x7' on UBE]    Assessment: [x] Progressing toward goals. Pt presents with dec pain, and reports relief post last visit. Reports no longer experiencing flare-up in symptoms, and also improved tolerance for standing AAROM wand ABD to 90 deg at home. Therefore, began postural interventions- including standing AAROM wand extension with scapular retraction and snow angels at wall- demos appropriate ROM and denies inc P. Also progressed supine B HABD and rhythmic stabs to include performing with inc resistance- reports inc P at start, which resolves quickly. Continues to demo P at 90 deg ABD with standing AROM, however, denies inc P with all other ranges and demos dec fatigue this date. Finished with vaso for pain management- again reports sig relief. Consider ideas above and below next visit. [] No change. [x] Other:  [x] Patient would continue to benefit from skilled physical therapy services in order to: in order to improve R shoulder A/PROM; R shoulder strength; and overall function of R UE.    STG: (to be met in 10 treatments)  1. ? Pain: Patient will report < 6/10 pain at worst over week in order to improve QOL.- MET   2. ? ROM: Will demo R shoulder PROM FLEX, SCAPTION, ER to equal L and with < 3/10 P in order to improve functional mobility.- MET   3. ? Strength: Will demo 4/5 gross R shoulder MMT with < 3/10 P in order to better match L and improve light ADLs through day. - MET   4. ? Function: Will report improved tolerance for sleeping without sling/ABD pillow- with pain < 4/10 and dec frequency of waking- for improved healing of surgical site and tolerance to PT services.- NOT MET- only without sling for sleep x1PM, and reports inc difficulty/pain  5. Patient to be independent with home exercise program as demonstrated by performance with correct form without cues. - MET  LTG: (to be met in 24 treatments)  1. Will demo R shoulder AROM to equal L and with < 3/10 P in order to improve functional reaching.- PROGRESSING   2. Will demo 4+/5 gross R shoulder strength in order to better match L and improve functional lifting.- NOT YET RE-ASSESSED   3. Improve UEFS to 60/80- PROGRESSING- 47/80              Patient goals: \"to be able to golf again. \"- PROGRESSING    Pt. Education:  [] Yes  [] No  [] Reviewed Prior HEP/Ed   Method of Education: [] Verbal  [] Demo  [] Written   Comprehension of Education:  [] Verbalizes understanding. [] Demonstrates understanding. [] Needs review. [] Demonstrates/verbalizes HEP/Ed previously given. 7/14/2020- See subjective and grid above. 7/23/2020- All above in grid labeled as HEP issued for home- verbalized understanding to all. Issued new, updated HEP handout. Plan: [x] Continue current frequency toward long and short term goals. [x] Specific Instructions for subsequent treatments: Begin 1-2# resistance with standing arm-lifts; standing ball on wall stabs; and resisted IR/ER iso walk-out next visit. Time In: 9:02AM      Time Out: 10AM    Electronically signed by:  Robbin Brody, PT, SPTA   This document was completed by RYAN Palmer,  reviewed and accepted by supervising JENA Coyne PTA.

## 2020-08-13 ENCOUNTER — APPOINTMENT (OUTPATIENT)
Dept: PHYSICAL THERAPY | Facility: CLINIC | Age: 70
End: 2020-08-13
Payer: MEDICARE

## 2020-08-14 ENCOUNTER — HOSPITAL ENCOUNTER (OUTPATIENT)
Dept: PHYSICAL THERAPY | Facility: CLINIC | Age: 70
Setting detail: THERAPIES SERIES
Discharge: HOME | End: 2020-08-14
Payer: MEDICARE

## 2020-08-14 PROCEDURE — 97110 THERAPEUTIC EXERCISES: CPT

## 2020-08-14 PROCEDURE — 97016 VASOPNEUMATIC DEVICE THERAPY: CPT

## 2020-08-14 NOTE — FLOWSHEET NOTE
[] [facility]) - [facility] &  Therapy  [address]  [phone]  F: [phone] [] [address]  [facility]   [address]  P: [phone]  F: [phone] [x] [address] &  Therapy  [address] [facility]  P: [phone]  F: [phone] [] [address]  [facility]   [address]   [place]: [phone]  F: [phone]      Physical Therapy Daily Treatment Note    Date:  2020  Patient Name:  Lesli De Los Santos    :    MRN: [id number]  Physician: [address] [place] [address] Street: Medicare railroad- based on medical necessity   Medical Diagnosis: M75.01- right rotator cuff tear               Rehab Codes: M75.01  Onset Date: S/p R RTC, biceps repair on 2020                        Next 's appt: 2020  Visit# / total visits: 10/24; Progress note for Medicare patient due at visit 24     Cancels/No Shows: 0/0     Subjective:    Pain:  [x] Yes  [] No Location: anterior R shoulder Pain Rating: (0-10 scale) 2-3/10  Pain altered Tx:  [x] No  [] Yes  Action: N/A   Comments: PT states having some pain/soreness still present. Most difficulty with sleeping, unable to find a comfortable position.       Todays Treatment:  Modalities: CP- R shoulder in sitting with pillow support x10' post treatment session; resumed vaso 2020  Precautions: s/p R RTC, biceps repair [potential]- 2020; FOLLOW PROTOCOL IN MEDIA TAB   Exercises: no lifting greater than coffee cup- 2020    Exercise Reps/ Time Weight/ Level Comments   UBE x7' L7 B UE s   AAROM pulleys x2' ea  Flexion, abduction  HELD- AWAITING NEW    Wand extension 2x10, 5\"  With scapular retraction   Also for active pec, biceps stretching    Snow angels  x10, 5\"  For active pec stretching   Denies P, even at 90 deg    Ball on wall stabilization 10x ea    small basketball, fatigues quickly   AAROM standing wand flexion, ER/IR  wand ABD 20x, 5\" ea      Supine rhythmic stabs 4x30\" ea MOD OP  1.5# AGAINST PT  ALL DIRECTIONS  DENIES P  TCs for scapular retraction   S/L ER, ABD to 90 deg 2x10 ea 0# Towel roll for RTC blood-supply   Denies P  TCs for consistent scapular retraction    Supine Tband BHABD 20x ea BTB 5/10 P at start; quickly resolves    Supine Tband B ER 20x ea LIME HELD- 8/11/2020   Standing AROM arm-lifts x10 ea 1#    Wall-slides FLEX/SCAPT x10, 5\" ea   With towel  FLEX- B UEs     SB wall roll-ups X10, 5\" Red With OP at end-range         Tband ER/Ir walkout 15x ea Lime    Tband Rows 20x ea Blue    Tband shoulder Ext 20x ea Blue    Prone single-arm row 2x10  0# Pt denied pn- pt noted low difficulty    Prone extension 2x10 0#          Other: Jose Sousa is completed. D/C PROM       7/23/2020  PROM  FULL ROM R SHOULDER PROM IN ALL MOTIONS, EXCEPT 50 DEG ER  REPORTS ONLY STRETCH SENSATION AT END-RANGE FLEXION, ER       Specific Instructions for next treatment:     Treatment Charges: Mins Units   []  Modalities- CP     [x]  Ther Exercise 45 3   []  Manual Therapy     []  Ther Activities     []  Aquatics     [x]  Vasocompression 10 1   []  Other     Total Treatment time 55 4       Assessment: [x] Progressing toward goals. Continued with AROM ex as noted above, pt denying any pain. Able to add additional scapular stabilization ex along with increased weight for strengthening purposes, increased difficulty noted. Pt fatigued quickly with ball on wall stabilization ex, initially completed with 1.5lb ball but decreased to small basketball d/t increased pain initially. Addition of 1# weight with standing arm lifts and SL ex also proved challenging to pt. Pt reporting increased soreness and arm fatigue post ex. Continued with vaso for symptoms relief. [] No change.      [x] Other:  [x] Patient would continue to benefit from skilled physical therapy signed by:  Gibran Garza PTA, SPTA

## 2020-08-18 ENCOUNTER — HOSPITAL ENCOUNTER (OUTPATIENT)
Dept: PHYSICAL THERAPY | Facility: CLINIC | Age: 70
Setting detail: THERAPIES SERIES
Discharge: HOME | End: 2020-08-18
Payer: MEDICARE

## 2020-08-18 PROCEDURE — 97110 THERAPEUTIC EXERCISES: CPT

## 2020-08-18 PROCEDURE — 97016 VASOPNEUMATIC DEVICE THERAPY: CPT

## 2020-08-18 NOTE — FLOWSHEET NOTE
ER/IR  wand ABD 20x, 5\" ea      Supine rhythmic stabs 4x30\" ea MOD OP  1.5# AGAINST PT  ALL DIRECTIONS  DENIES P  TCs for scapular retraction   S/L ER, ABD to 90 deg 2x10 ea 0# Towel roll for RTC blood-supply   Denies P  TCs for consistent scapular retraction    Supine Tband BHABD 20x ea LIME    Supine Tband B ER 20x ea LIME    Standing AROM arm-lifts x10 ea 1#    Wall-slides FLEX/SCAPT x10, 5\" ea   With towel  FLEX- B UEs     SB wall roll-ups X10, 5\" Red With OP at end-range         Tband ER/Ir 15x ea Lime    Tband Rows 20x ea Lime    Tband shoulder Ext 20x ea Lime    Prone single-arm row 2x10  0# Pt denied pn- pt noted low difficulty    Prone extension 2x10 0#          Other: Soni Qamar is completed. D/C PROM       7/23/2020  PROM  FULL ROM R SHOULDER PROM IN ALL MOTIONS, EXCEPT 50 DEG ER  REPORTS ONLY STRETCH SENSATION AT END-RANGE FLEXION, ER       Specific Instructions for next treatment:     Treatment Charges: Mins Units   []  Modalities- CP     [x]  Ther Exercise 45 3   []  Manual Therapy     []  Ther Activities     []  Aquatics     [x]  Vasocompression 15 1   []  Other     Total Treatment time 60 4       Assessment: [x] Progressing toward goals. Pt completed all there ex with good tolerance, noted that all exercise with the exception of SL abduction was \"easy\" and denied pn throughout activity. Progressed pt to 2lb dumbbell for rhythmic stabs in all directions with pt demonstrating good tolerance. Pt showed increased endurance to ball on wall (required VC for scapular positioning with good carry over) with small basketball denying the same feeling of fatigue noted in last tx. Pt resumed AAROM pulleys with good satnam. Pt received vaso for inflammation management post tx.   [] No change.      [] Other:  [x] Patient would continue to benefit from skilled physical therapy services in order to: in order to improve R shoulder A/PROM; R shoulder strength; and overall function of R UE.    STG: (to be met in 10

## 2020-08-19 RX ORDER — AMLODIPINE BESYLATE 10 MG/1
TABLET ORAL
Qty: 90 TABLET | Refills: 0 | Status: SHIPPED | OUTPATIENT
Start: 2020-08-19 | End: 2020-08-26

## 2020-08-20 ENCOUNTER — HOSPITAL ENCOUNTER (OUTPATIENT)
Dept: PHYSICAL THERAPY | Facility: CLINIC | Age: 70
Setting detail: THERAPIES SERIES
Discharge: HOME | End: 2020-08-20
Payer: MEDICARE

## 2020-08-20 PROCEDURE — 97110 THERAPEUTIC EXERCISES: CPT

## 2020-08-20 PROCEDURE — 97016 VASOPNEUMATIC DEVICE THERAPY: CPT

## 2020-08-20 NOTE — FLOWSHEET NOTE
[] [facility]) - [facility] &  Therapy  [address]  [phone]  F: [phone] [] [address]  [facility]   [address]  P: [phone]  F: [phone] [x] [address] &  Therapy  [address] [facility]  P: [phone]  F: [phone] [] [address]  [facility]   [address]   [place]: [phone]  F: [phone]      Physical Therapy Daily Treatment Note    Date:  2020  Patient Name:  Carlota Dutton    :    MRN: [id number]  Physician: [address] [place] [address] Street: Medicare railroad- based on medical necessity   Medical Diagnosis: M75.01- right rotator cuff tear               Rehab Codes: M75.01  Onset Date: S/p R RTC, biceps repair on 2020                        Next 's appt: 2020  Visit# / total visits: ; Progress note for Medicare patient due at visit 24     Cancels/No Shows: 0/0     Subjective:    Pain:  [x] Yes  [] No Location: anterior R shoulder Pain Rating: (0-10 scale) 0/10  Pain altered Tx:  [x] No  [] Yes  Action: N/A   Comments: Reports good tolerance to last, progressed treatment session- \"feeling great. \" Denies P at present. Reports good tolerance to performing yard work, and also completing HEP interventions x2 per day.      Todays Treatment:  Modalities: CP- R shoulder in sitting with pillow support x10' post treatment session; resumed vaso 2020  Precautions: s/p R RTC, biceps repair [potential]- 2020; FOLLOW PROTOCOL IN MEDIA TAB   Exercises: no lifting greater than coffee cup- 2020  Exercise Reps/ Time Weight/ Level Comments   UBE x7' L8 B UE s   AAROM pulleys x2' ea  Flexion, abduction   Wand extension 2x10, 5\"  With scapular retraction   Also for active pec, biceps stretching   HEP   Snow angels  x10, 5\"  For active pec stretching   Denies P, even at 90 deg   HEP   Ball on wall stabilization 10x ea 1.5# Small basketball   AAROM standing wand flexion, ER/IR  wand ABD 20x, 5\" ea   > 90 deg  Denies P  In mirror for visual feedback   Supine rhythmic stabs 4x30\" ea MOD OP  1.5# AGAINST PT  ALL DIRECTIONS  DENIES P  TCs for scapular retraction   S/L ER, ABD to 90 deg 2x10 ea 1# Towel roll for RTC blood-supply   Denies P  TCs for consistent scapular retraction   1#- 8/20/2020  HEP   Supine Tband BHABD 20x ea BTB HEP   Supine Tband B ER 20x ea BTB HEP   Standing AROM arm-lifts 2x10 ea 2# To 90 deg  2#- 8/20/2020  1# with ABD as fatigues quickly and begins to compensate; only x10 of this motion    Wall-slides FLEX/SCAPT x10, 5\" ea   With towel  FLEX- B UEs     SB wall roll-ups X10, 5\" Red With OP at end-range         Tband ER/Ir 15x ea Lime    Tband Rows 20x ea Lime    Tband shoulder Ext 20x ea Lime    Prone single-arm row 2x10  0# Pt denied pn- pt noted low difficulty; HEP   Prone extension 2x10 0# HEP         Wall push-up  x20  NEW- 8/20/2020  Denies P   HEP education   SEE BELOW- 8/20/2020                     Other: Lauraine Mode is completed. Specific Instructions for next treatment: Follow-up with pt regarding tolerance to new, updated HEP handout- modify PRN. Also tolerance to wall push-ups and inc resistance of previous interventions. Continue to inc resistance of free weight and TB as able for next x4 visits. Treatment Charges: Mins Units   []  Modalities- CP     [x]  Ther Exercise 38 2   []  Manual Therapy     []  Ther Activities     []  Aquatics     [x]  Vasocompression 15 1   []  Other     Total Treatment time 53 3     [x7' on UBE]    Assessment: [x] Progressing toward goals. Pt presents without pain or symptoms, and reports good tolerance to last visit. Therefore, inc resistance of previous interventions- tolerates well.  However, continues to fatigue quickly with standing AROM shoulder abduction against 0-1#- instructed to only perform x1 set in order to avoid compensations. Also began wall push-ups through small ROM- again denies pain and performs with ease. Issued new, updated HEP handout for inc carry-over of R RTC and postural strength between visits- verbalized understanding to all. Finished with vaso for management of muscle soreness. Consider ideas above and below next visit. [] No change. [] Other:  [x] Patient would continue to benefit from skilled physical therapy services in order to: in order to improve R shoulder A/PROM; R shoulder strength; and overall function of R UE.    STG: (to be met in 10 treatments)  1. ? Pain: Patient will report < 6/10 pain at worst over week in order to improve QOL.- MET   2. ? ROM: Will demo R shoulder PROM FLEX, SCAPTION, ER to equal L and with < 3/10 P in order to improve functional mobility.- MET   3. ? Strength: Will demo 4/5 gross R shoulder MMT with < 3/10 P in order to better match L and improve light ADLs through day. - MET   4. ? Function: Will report improved tolerance for sleeping without sling/ABD pillow- with pain < 4/10 and dec frequency of waking- for improved healing of surgical site and tolerance to PT services. -MET  5. Patient to be independent with home exercise program as demonstrated by performance with correct form without cues. - MET  LTG: (to be met in 24 treatments)  1. Will demo R shoulder AROM to equal L and with < 3/10 P in order to improve functional reaching.- PROGRESSING   2. Will demo 4+/5 gross R shoulder strength in order to better match L and improve functional lifting.- NOT YET RE-ASSESSED   3. Improve UEFS to 60/80- PROGRESSING- 47/80              Patient goals: \"to be able to golf again. \"- PROGRESSING    Pt. Education:  [] Yes  [] No  [] Reviewed Prior HEP/Ed   Method of Education: [] Verbal  [] Demo  [] Written   Comprehension of Education:  [] Verbalizes understanding. [] Demonstrates understanding. [] Needs review.   [] Demonstrates/verbalizes HEP/Ed

## 2020-08-21 ENCOUNTER — OFFICE VISIT (OUTPATIENT)
Dept: FAMILY MEDICINE CLINIC | Age: 70
End: 2020-08-21
Payer: MEDICARE

## 2020-08-21 VITALS
HEART RATE: 70 BPM | BODY MASS INDEX: 33.79 KG/M2 | SYSTOLIC BLOOD PRESSURE: 132 MMHG | TEMPERATURE: 97.2 F | OXYGEN SATURATION: 97 % | DIASTOLIC BLOOD PRESSURE: 84 MMHG | WEIGHT: 222.2 LBS

## 2020-08-21 PROBLEM — E66.01 MORBIDLY OBESE (HCC): Status: ACTIVE | Noted: 2020-08-21

## 2020-08-21 PROBLEM — E66.01 MORBIDLY OBESE (HCC): Status: RESOLVED | Noted: 2020-08-21 | Resolved: 2020-08-21

## 2020-08-21 PROBLEM — I10 ESSENTIAL HYPERTENSION: Status: ACTIVE | Noted: 2020-08-21

## 2020-08-21 PROBLEM — I10 ESSENTIAL HYPERTENSION: Chronic | Status: ACTIVE | Noted: 2020-08-21

## 2020-08-21 PROBLEM — R73.01 IFG (IMPAIRED FASTING GLUCOSE): Status: ACTIVE | Noted: 2020-08-21

## 2020-08-21 PROBLEM — E78.00 PURE HYPERCHOLESTEROLEMIA: Status: ACTIVE | Noted: 2020-08-21

## 2020-08-21 PROBLEM — R73.01 IFG (IMPAIRED FASTING GLUCOSE): Chronic | Status: ACTIVE | Noted: 2020-08-21

## 2020-08-21 PROBLEM — E78.00 PURE HYPERCHOLESTEROLEMIA: Chronic | Status: ACTIVE | Noted: 2020-08-21

## 2020-08-21 PROCEDURE — G8427 DOCREV CUR MEDS BY ELIG CLIN: HCPCS | Performed by: FAMILY MEDICINE

## 2020-08-21 PROCEDURE — 99214 OFFICE O/P EST MOD 30 MIN: CPT | Performed by: FAMILY MEDICINE

## 2020-08-21 PROCEDURE — 1123F ACP DISCUSS/DSCN MKR DOCD: CPT | Performed by: FAMILY MEDICINE

## 2020-08-21 PROCEDURE — 1036F TOBACCO NON-USER: CPT | Performed by: FAMILY MEDICINE

## 2020-08-21 PROCEDURE — 4040F PNEUMOC VAC/ADMIN/RCVD: CPT | Performed by: FAMILY MEDICINE

## 2020-08-21 PROCEDURE — G8417 CALC BMI ABV UP PARAM F/U: HCPCS | Performed by: FAMILY MEDICINE

## 2020-08-21 PROCEDURE — 3017F COLORECTAL CA SCREEN DOC REV: CPT | Performed by: FAMILY MEDICINE

## 2020-08-21 RX ORDER — FOLIC ACID 1 MG/1
TABLET ORAL DAILY
COMMUNITY

## 2020-08-21 SDOH — ECONOMIC STABILITY: TRANSPORTATION INSECURITY
IN THE PAST 12 MONTHS, HAS LACK OF TRANSPORTATION KEPT YOU FROM MEETINGS, WORK, OR FROM GETTING THINGS NEEDED FOR DAILY LIVING?: NO

## 2020-08-21 SDOH — ECONOMIC STABILITY: FOOD INSECURITY: WITHIN THE PAST 12 MONTHS, THE FOOD YOU BOUGHT JUST DIDN'T LAST AND YOU DIDN'T HAVE MONEY TO GET MORE.: NEVER TRUE

## 2020-08-21 SDOH — ECONOMIC STABILITY: FOOD INSECURITY: WITHIN THE PAST 12 MONTHS, YOU WORRIED THAT YOUR FOOD WOULD RUN OUT BEFORE YOU GOT MONEY TO BUY MORE.: NEVER TRUE

## 2020-08-21 SDOH — ECONOMIC STABILITY: TRANSPORTATION INSECURITY
IN THE PAST 12 MONTHS, HAS THE LACK OF TRANSPORTATION KEPT YOU FROM MEDICAL APPOINTMENTS OR FROM GETTING MEDICATIONS?: NO

## 2020-08-21 ASSESSMENT — PATIENT HEALTH QUESTIONNAIRE - PHQ9
1. LITTLE INTEREST OR PLEASURE IN DOING THINGS: 0
SUM OF ALL RESPONSES TO PHQ9 QUESTIONS 1 & 2: 0
2. FEELING DOWN, DEPRESSED OR HOPELESS: 0
SUM OF ALL RESPONSES TO PHQ QUESTIONS 1-9: 0
SUM OF ALL RESPONSES TO PHQ QUESTIONS 1-9: 0

## 2020-08-21 NOTE — PROGRESS NOTES
Subjective:  Adrian Anderson presents for   Chief Complaint   Patient presents with    Hypertension     bp at home is 130/80  lOst weight on purpose    Feels fine    Had right rotator cuff surgery in June AND IS STILL RECOVERING      Patient Active Problem List   Diagnosis    History of calcium pyrophosphate deposition disease (CPPD)    Inflammatory arthropathy    Diverticulosis of colon         Review of Systems:  · General: no significant weight changes. · Respiratory: no cough, pleuritic chest pain, dyspnea, or wheezing  · Cardiovascular: no pain, CADE, orthopnea, palpitations or claudication  · Gastrointestinal: no chronic nausea, vomiting, heartburn, diarrhea, constipation, bloating, or abdominal pain. No bloody or black stools. Objective:  Physical Exam   Vitals:   Vitals:    08/21/20 0758   BP: 132/84   Pulse: 70   Temp: 97.2 °F (36.2 °C)   TempSrc: Temporal   SpO2: 97%   Weight: 222 lb 3.2 oz (100.8 kg)     Wt Readings from Last 3 Encounters:   08/21/20 222 lb 3.2 oz (100.8 kg)   10/11/19 236 lb 6 oz (107.2 kg)   04/11/19 234 lb 6.4 oz (106.3 kg)     Ht Readings from Last 3 Encounters:   04/11/19 5' 8\" (1.727 m)   12/29/17 5' 7\" (1.702 m)   07/22/15 5' 8\" (1.727 m)     Body mass index is 33.79 kg/m². Constitutional: He is oriented to person, place, and time. He appears well-developed and well-nourished and in no acute distress. Answers all my questions appropriately. Head: Normocephalic and atraumatic. Eyes:conjunctiva appear normal.  Nose: pink, non-edematous mucosa. No polyps. No septal deviation  Throat: no erythema, tonsillar hypertrophy or exudate. No ulcerations noted. Lips/Teeth/Gums all appear normal.  Neck: Normal range of motion. Neck supple. No tracheal deviation present. No abnormal lymphadenopathy. No JVD noted. Carotids are clear bilaterally. No thyroid masses noted. Heart: RRR without murmur. No S3, S4, or gallop noted. Chest: Clear to auscultation bilaterally.   Good breath sounds noted. No rales, wheezes, or rhonchi noted. No respiratory retractions noted. Wall has symmetrical movement with respirations. No pedal edema    Assessment:   Encounter Diagnoses   Name Primary?  Essential hypertension Yes    Pure hypercholesterolemia     IFG (impaired fasting glucose)          Plan:   No change in meds at this time    Get the flu shot    There are no discontinued medications. THE ABOVE NOTED DISCONTINUED MEDS MAY ONLY BE FROM 'CLEANING UP' THE MED LIST AND WERE NOT ACTUALLY CANCELED;  SEE CHART FOR DETAILS! No orders of the defined types were placed in this encounter. Orders Placed This Encounter   Procedures    CBC Auto Differential     Standing Status:   Future     Standing Expiration Date:   8/21/2021    Comprehensive Metabolic Panel     Standing Status:   Future     Standing Expiration Date:   8/21/2021    Lipid Panel     Standing Status:   Future     Standing Expiration Date:   8/21/2021     Order Specific Question:   Is Patient Fasting?/# of Hours     Answer:   8    Hemoglobin A1C     Standing Status:   Future     Standing Expiration Date:   8/21/2021     Return in about 6 months (around 2/21/2021). There are no Patient Instructions on file for this visit.   Data Unavailable

## 2020-08-24 LAB
ABSOLUTE BASO #: 0.1 X10E9/L (ref 0–0.2)
ABSOLUTE EOS #: 0.3 X10E9/L (ref 0–0.4)
ABSOLUTE LYMPH #: 2.3 X10E9/L (ref 1–3.5)
ABSOLUTE MONO #: 0.5 X10E9/L (ref 0–0.9)
ABSOLUTE NEUT #: 2.9 X10E9/L (ref 1.5–6.6)
ALBUMIN SERPL-MCNC: 4.2 G/DL (ref 3.2–5.3)
ALK PHOSPHATASE: 54 U/L (ref 39–130)
ALT SERPL-CCNC: 19 U/L (ref 0–40)
ANION GAP SERPL CALCULATED.3IONS-SCNC: 7 MMOL/L (ref 5–15)
AST SERPL-CCNC: 19 U/L (ref 0–41)
AVERAGE GLUCOSE: 134 MG/DL
BASOPHILS RELATIVE PERCENT: 1 %
BILIRUB SERPL-MCNC: 0.5 MG/DL (ref 0.3–1.2)
BUN BLDV-MCNC: 14 MG/DL (ref 5–27)
CALCIUM SERPL-MCNC: 9.3 MG/DL (ref 8.5–10.5)
CHLORIDE BLD-SCNC: 106 MMOL/L (ref 98–109)
CHOLESTEROL/HDL RATIO: 2.9 (ref 1–5)
CHOLESTEROL: 111 MG/DL (ref 150–200)
CO2: 27 MMOL/L (ref 22–32)
CREAT SERPL-MCNC: 0.95 MG/DL (ref 0.6–1.3)
EGFR AFRICAN AMERICAN: >60 ML/MIN/1.73SQ.M
EGFR IF NONAFRICAN AMERICAN: >60 ML/MIN/1.73SQ.M
EOSINOPHILS RELATIVE PERCENT: 4.9 %
GLUCOSE: 120 MG/DL (ref 65–99)
HBA1C MFR BLD: 6.3 % (ref 4.4–6.4)
HCT VFR BLD CALC: 40.5 % (ref 39–49)
HDLC SERPL-MCNC: 38 MG/DL
HEMOGLOBIN: 13.7 G/DL (ref 13–17)
LDL CHOLESTEROL CALCULATED: 54 MG/DL
LDL/HDL RATIO: 1.4
LYMPHOCYTE %: 37.4 %
MCH RBC QN AUTO: 29.8 PG (ref 27–34)
MCHC RBC AUTO-ENTMCNC: 33.7 G/DL (ref 32–36)
MCV RBC AUTO: 88 FL (ref 80–100)
MONOCYTES # BLD: 8.3 %
NEUTROPHILS RELATIVE PERCENT: 48.4 %
PDW BLD-RTO: 14.3 % (ref 11.5–15)
PLATELETS: 241 X10E9/L (ref 150–450)
PMV BLD AUTO: 8.6 FL (ref 7–12)
POTASSIUM SERPL-SCNC: 4.1 MMOL/L (ref 3.5–5)
RBC: 4.59 X10E12/L (ref 4.1–5.7)
SODIUM BLD-SCNC: 140 MMOL/L (ref 134–146)
TOTAL PROTEIN: 6.7 G/DL (ref 6–8)
TRIGL SERPL-MCNC: 95 MG/DL (ref 27–150)
VLDLC SERPL CALC-MCNC: 19 MG/DL (ref 0–30)
WBC: 6.1 X10E9/L (ref 4–11)

## 2020-08-25 ENCOUNTER — HOSPITAL ENCOUNTER (OUTPATIENT)
Dept: PHYSICAL THERAPY | Facility: CLINIC | Age: 70
Setting detail: THERAPIES SERIES
Discharge: HOME | End: 2020-08-25
Payer: MEDICARE

## 2020-08-25 PROCEDURE — 97016 VASOPNEUMATIC DEVICE THERAPY: CPT

## 2020-08-25 PROCEDURE — 97110 THERAPEUTIC EXERCISES: CPT

## 2020-08-25 NOTE — FLOWSHEET NOTE
[] [facility]) - [facility] &  Therapy  [address]  [phone]  F: [phone] [] [address]  [facility]   [address]  P: [phone]  F: [phone] [x] [address] &  Therapy  [address] [facility]  P: [phone]  F: [phone] [] [address]  [facility]   [address]   [place]: [phone]  F: [phone]      Physical Therapy Daily Treatment Note    Date:  2020  Patient Name:  Hamilton Garcia    :    MRN: [id number]  Physician: [address] [place] [address] Street: Medicare railroad- based on medical necessity   Medical Diagnosis: M75.01- right rotator cuff tear               Rehab Codes: M75.01  Onset Date: S/p R RTC, biceps repair on 2020                        Next 's appt: 2020  Visit# / total visits: ; Progress note for Medicare patient due at visit 24     Cancels/No Shows: 0/0     Subjective:  Pt notes that abduction is still difficult during HEP. Reports everything is \"ok\". Pt also noted \"a lot of soreness\" post last tx but the soreness \"has started to go away since then. \"  Pain:  [x] Yes  [] No Location: anterior R shoulder Pain Rating: (0-10 scale) 3-4/10  Pain altered Tx:  [x] No  [] Yes  Action: N/A   Comments:     Todays Treatment:  Modalities: CP- R shoulder in sitting with pillow support x10' post treatment session; resumed vaso 2020  Precautions: s/p R RTC, biceps repair [potential]- 2020; FOLLOW PROTOCOL IN MEDIA TAB   Exercises: no lifting greater than coffee cup- 2020  Exercise Reps/ Time Weight/ Level Comments   UBE x8' L8 B UE s   AAROM pulleys x2' ea  Flexion, abduction   Wand extension 2x10, 5\"  With scapular retraction   Also for active pec, biceps stretching   HEP   Snow angels  x10, 5\"  For active pec stretching Denies P, even at 90 deg   HEP   Ball on wall stabilization 15x ea 1.5# Small basketball   AAROM standing wand flexion, ER/IR  wand ABD 20x, 5\" ea   > 90 deg  Denies P   In mirror for visual feedback   Supine rhythmic stabs 4x30\" ea MOD OP  1.5# AGAINST PT  ALL DIRECTIONS  DENIES P  TCs for scapular retraction   S/L ER, ABD to 90 deg 2x10 ea 1# Towel roll for RTC blood-supply   Denies P  TCs for consistent scapular retraction   1#- 8/20/2020  HEP   Supine Tband BHABD 20x ea BTB HEP   Supine Tband B ER 20x ea BTB HEP   Standing AROM arm-lifts 2x10 ea 2# To 90 deg  2#- 8/20/2020  1# with ABD as fatigues quickly and begins to compensate; only x10 of this motion    Wall-slides FLEX/SCAPT x10, 5\" ea   With towel  FLEX- B UEs     SB wall roll-ups X10, 5\" Red With OP at end-range         Tband ER/Ir 15x ea Lime    Tband Rows 20x ea Lime    Tband shoulder Ext 20x ea Lime    Prone single-arm row 2x10  0# Pt denied pn- pt noted low difficulty; HEP   Prone extension 2x10 0# HEP         Wall push-up  x20  NEW- 8/20/2020  Denies P   HEP education   SEE BELOW- 8/20/2020                     Other: Kirit Freeman is completed. Specific Instructions for next treatment: Continue to inc resistance of free weight and TB as able for next x3 visits. Treatment Charges: Mins Units   []  Modalities- CP     [x]  Ther Exercise 45 3   []  Manual Therapy     []  Ther Activities     []  Aquatics     [x]  Vasocompression 10 1   []  Other     Total Treatment time 55 4     [x8' on UBE]    Assessment: [x] Progressing toward goals. Followed suggestions from supervising PT. Followed up with pt on new HEP with pt noting that abduction is the only motion that causes pn and after abduction activities when pt is attempting other motions (such as scap or flex) his arm will \"just drop\"; this dropping of the arm is inconsistant. If pt does not participate in abd activity the pt feels no issues with flex, scaption, and abduction motions.  Wall pushups caused no Verbalizes understanding. [x] Demonstrates understanding. [] Needs review. [x] Demonstrates/verbalizes HEP/Ed previously given. 7/14/2020- See subjective and grid above. 7/23/2020- All above in grid labeled as HEP issued for home- verbalized understanding to all. Issued new, updated HEP handout. Plan: [x] Continue current frequency toward long and short term goals. Cont per POC. [x] Specific Instructions for subsequent treatments:  Continue to inc resistance of free weight and TB as able for next x3 visits.         Time In: 8:50AM      Time Out: 9:50AM    Electronically signed by:  Robbin Butt PTA

## 2020-08-26 RX ORDER — AMLODIPINE BESYLATE 10 MG/1
TABLET ORAL
Qty: 90 TABLET | Refills: 0 | Status: SHIPPED | OUTPATIENT
Start: 2020-08-26 | End: 2020-11-23

## 2020-08-26 RX ORDER — ENALAPRIL MALEATE 10 MG/1
TABLET ORAL
Qty: 90 TABLET | Refills: 0 | Status: SHIPPED | OUTPATIENT
Start: 2020-08-26 | End: 2020-11-23

## 2020-08-26 NOTE — TELEPHONE ENCOUNTER
Eugune Holter is calling to request a refill on the following medication(s):    Medication Request:  Requested Prescriptions     Pending Prescriptions Disp Refills    enalapril (VASOTEC) 10 MG tablet [Pharmacy Med Name: Enalapril Maleate Oral Tablet 10 MG] 90 tablet 0     Sig: TAKE 1 TABLET BY MOUTH ONE TIME A DAY    amLODIPine (NORVASC) 10 MG tablet [Pharmacy Med Name: amLODIPine Besylate Oral Tablet 10 MG] 90 tablet 0     Sig: TAKE 1 TABLET BY MOUTH ONE TIME A DAY       Last Visit Date (If Applicable):  0/17/6082    Next Visit Date:    2/22/2021

## 2020-08-27 ENCOUNTER — HOSPITAL ENCOUNTER (OUTPATIENT)
Dept: PHYSICAL THERAPY | Facility: CLINIC | Age: 70
Setting detail: THERAPIES SERIES
Discharge: HOME | End: 2020-08-27
Payer: MEDICARE

## 2020-08-27 PROCEDURE — 97110 THERAPEUTIC EXERCISES: CPT

## 2020-08-27 PROCEDURE — 97016 VASOPNEUMATIC DEVICE THERAPY: CPT

## 2020-08-27 NOTE — FLOWSHEET NOTE
flexion, ER/IR  wand ABD 20x, 5\" ea   > 90 deg  Denies P   In mirror for visual feedback   Supine rhythmic stabs 4x30\" ea MOD OP  1.5# AGAINST PT  ALL DIRECTIONS  DENIES P  TCs for scapular retraction   S/L ER, ABD to 90 deg 2x10 ea 2#      S/L HAB 2x10 1#    Supine Tband BHABD 20x ea plum HEP   Supine Tband B ER 20x ea plum HEP   Standing AROM arm-lifts 2x10 ea 2# To 90 deg  2#- 8/20/2020      Wall-slides FLEX/SCAPT x15, 5\" ea  1# 1# weight in pillow case     SB wall roll-ups X10, 5\" Red With OP at end-range         Tband ER/Ir 3x10 ea blue    Tband Rows 20x ea Lime    Tband shoulder Ext 20x ea Lime    Prone single-arm row 2x10  0# Pt denied pn- pt noted low difficulty; HEP   Prone extension 2x10 0# HEP         Wall push-up  x20  NEW- 8/20/2020  Denies P   HEP education   SEE BELOW- 8/20/2020                     Other: Sherri Colunga is completed. Specific Instructions for next treatment: Continue to inc resistance of free weight and TB as able for next x3 visits. Schedule re-check with PT. Treatment Charges: Mins Units   []  Modalities- CP     [x]  Ther Exercise 48 3   []  Manual Therapy     []  Ther Activities     []  Aquatics     [x]  Vasocompression 10 1   []  Other     Total Treatment time 58 4     Assessment: [x] Progressing toward goals. Continued with UBE for warm up followed by AROM stretches. Able to increase resistance with theraband ex and increased weight with SL ex for continued RUE strengthening. Denied pain with ball on wall stabilization completing with 1.5# ball. Pt noting shoulder fatigue post ex. Intermittent \"catching\" with different ex but able to complete all ex. Vaso applied post ex for symptom relief. [] No change.      [] Other:  [x] Patient would continue to benefit from skilled physical therapy services in order to: in order to improve R shoulder A/PROM; R shoulder strength; and overall function of R UE.    STG: (to be met in 10 treatments)  1. ? Pain: Patient will report < 6/10 pain at

## 2020-09-01 ENCOUNTER — HOSPITAL ENCOUNTER (OUTPATIENT)
Dept: PHYSICAL THERAPY | Facility: CLINIC | Age: 70
Setting detail: THERAPIES SERIES
Discharge: HOME | End: 2020-09-01
Payer: MEDICARE

## 2020-09-01 NOTE — FLOWSHEET NOTE
[] [address]  [address]    [phone]  F: [phone]   [] [address]  [facility] 36   [address]  P: [phone]  F: [phone]  [x] [address]  [address] [facility]  P: [phone]  F: [phone]  [] [address]  [address] [facility] [address]   [address]   [place]: [phone]  F: [phone]   [] [facility]  [address] [facility] [address]  [place]: [phone]   F: [phone]     Physical Therapy Cancel/No Show note    Date: 2020  Patient: Uziel Thakur  :   MRN: [id number]    Cancels/No Shows to date:   For today's appointment patient:    [x]  Cancelled    [] Rescheduled appointment    [] No-show     Reason given by patient:    []  Patient ill    []  Conflicting appointment    [] No transportation      [] Conflict with work    [] No reason given    [] Weather related    [] [id number]    [x] Other:      Comments:  Patient stated that he pulled a muscle     [x] Next appointment was confirmed    Electronically signed by: Debbie Jackson

## 2020-09-03 ENCOUNTER — HOSPITAL ENCOUNTER (OUTPATIENT)
Dept: PHYSICAL THERAPY | Facility: CLINIC | Age: 70
Setting detail: THERAPIES SERIES
Discharge: HOME | End: 2020-09-03
Payer: MEDICARE

## 2020-09-03 PROCEDURE — 97110 THERAPEUTIC EXERCISES: CPT

## 2020-09-03 PROCEDURE — 97016 VASOPNEUMATIC DEVICE THERAPY: CPT

## 2020-09-03 NOTE — DISCHARGE SUMMARY
[] [facility]) - [facility] &  Therapy  [address]  [phone]  F: [phone] [] [address]  [place] [address]   [address]  P: [phone]  F: [phone] [x] [address] &  Therapy  [address]  P: [phone]  F: [phone] [] [address]  [facility]   [address]   [place]: [phone]  F: [phone]      Physical Therapy Discharge Note    Date: 9/3/2020      Patient: Nimesh Walls  : [birth year]  MRN: [id number]    Physician: Willie Mac                               Insurance: Medicare railroad- based on medical necessity   Medical Diagnosis: M75.01- right rotator cuff tear                 Rehab Codes: M75.01  Onset Date: S/p R RTC, biceps repair on 2020                        Next 's appt: 2020  Visit# / total visits: 15/24; Progress note for Medicare patient due at visit 24                                  Cancels/No Shows: 0/0   Date of initial visit: 2020                Date of final visit: 9/3/2020    Subjective:  Pain:  [x]? Yes  []? No   Location: anterior R shoulder Pain Rating: (0-10 scale) 3-4/10  Pain altered Tx:  [x]? No  []? Yes  Action: N/A   Comments: Reports awaking Monday morning with \"pulled muscle\" within R flank, and then awaking with worse symptoms Tuesday morning. Denies specific HARMONY or aggravating factors for this pain and symptoms. However, reports driving to [employer] for approximately x2 hours each way- which may have contributed. Pain and symptoms have now resolved into today. Agreeable to discharge today from [address] duration to obtain subjective this date. Assessment:  Pt presents without pain, and reports good tolerance to last visit.  However, reports \"pulling a muscle\" within his R flank over the weekend, waking Exercise    [x] Modalities:  [] Therapeutic Activity    [] Ultrasound  [] Electrical Stimulation  [] Gait Training     [] Massage       [] Lumbar/Cervical Traction  [] Neuromuscular Re-education [x] Cold/hotpack [] Iontophoresis: 4 mg/mL  [x] Instruction in Home Exercise Program                     Dexamethasone Sodium  [x] Manual Therapy             Phosphate 40-80 mAmin  [] Aquatic Therapy                   [x] Vasocompression/    [] Other:             Game Ready    Discharge Status:     [x] Pt recovered from conditions. Treatment goals were met. [x] Pt received maximum benefit. No further therapy indicated at this time. [x] Pt to continue exercise/home instructions independently. [] Therapy interrupted due to:    [] Pt has 2 or more no shows/cancels, is discontinued per our policy. [] Pt has completed prescribed number of treatment sessions. [] Other:     Electronically signed by Jaye Sheridan PT on 9/3/2020 at 11:14 AM    If you have any questions or concerns, please don't hesitate to call.   Thank you for your referral.

## 2020-09-03 NOTE — FLOWSHEET NOTE
[] [facility]) - [facility] &  Therapy  [address]  [phone]  F: [phone] [] [address]  [place] [address]   [address]  P: [phone]  F: [phone] [x] [address] &  Therapy  [address]  P: [phone]  F: [phone] [] [address]  [facility]   [address]   [place]: [phone]  F: [phone]      Physical Therapy Daily Treatment Note    Date:  9/3/2020  Patient Name:  Alyssa Calderón    :  [id number]  MRN: [id number]  Physician: [address]: Medicare railroad- based on medical necessity   Medical Diagnosis: M75.01- right rotator cuff tear                 Rehab Codes: M75.01  Onset Date: S/p R RTC, biceps repair on 2020                        Next 's appt: 2020  Visit# / total visits: 15/24; Progress note for Medicare patient due at visit 24     Cancels/No Shows: 0/0     Subjective:  Pain:  [x] Yes  [] No Location: anterior R shoulder Pain Rating: (0-10 scale) 3-4/10  Pain altered Tx:  [x] No  [] Yes  Action: N/A   Comments: Reports awaking Monday morning with \"pulled muscle\" within R flank, and then awaking with worse symptoms Tuesday morning. Denies specific HARMONY or aggravating factors for this pain and symptoms. However, reports driving to [employer] for approximately x2 hours each way- which may have contributed. Pain and symptoms have now resolved into today. Agreeable to discharge today from PT services. Inc duration to obtain subjective this date.       Todays Treatment:  Modalities: CP- R shoulder in sitting with pillow support x10' post treatment session; resumed vaso 2020  Precautions: s/p R RTC, biceps repair [potential]- 2020; FOLLOW PROTOCOL IN MEDIA TAB   Exercises: no lifting greater than coffee cup- 8/4/2020  Exercise Reps/ Time Weight/ Level Comments   UBE x8' L8 B UE s; HELD- 9/3/2020- IN USE   AAROM pulleys x2' ea  Flexion, abduction   Wand extension X20, 5\" ea  With scapular retraction   Also for active pec, biceps stretching   HEP   Snow angels  x10, 5\"  For active pec stretching   Denies P, even at 90 deg   HEP   Ball on wall stabilization 4x10 ea 3.5# Cues to square shoulders to wall  HEP    AAROM standing wand flexion, ER/IR  wand ABD 20x, 5\" ea   > 90 deg  Denies P   In mirror for visual feedback  HEP   Supine rhythmic stabs 4x30\" ea MOD OP  1.5# AGAINST PT  ALL DIRECTIONS  DENIES P  TCs for scapular retraction   S/L ER, ABD to 90 deg x20 ea  2# ABD > 90 deg without P   S/L HAB x10 0# HEP  Quick fatigue with compensations this date    Supine Tband BHABD 20x ea plum HEP   Supine Tband B ER 20x ea plum HEP   Standing AROM arm-lifts x10 ea 2# To 90 deg  2#- 8/20/2020  R UT comp with ABD- quick fatigue   HEP   Wall-slides FLEX/SCAPT x15, 5\" ea   HEP     SB wall roll-ups X10, 5\" Red With OP at end-range  HEP- purchased personal SB for home          Tband ER/Ir 2x10 ea blue HEP   Tband Rows 20x ea Lime HEP   Tband shoulder Ext 20x ea Lime HEP   Prone single-arm row 2x10  0# Pt denied pn- pt noted low difficulty; HEP   Prone extension 2x10 0# HEP         Wall push-up  x20  NEW- 8/20/2020  Denies P  Biceps focus    HEP education   SEE BELOW- 8/20/2020         Goal update   COMPLETED- 9/3/2020   UEFS, HEP/PT POC education   COMPLETED- 9/3/2020         Other: Mitali Officer is completed. See below for goal update. UEFS is 69/80     Specific Instructions for next treatment: Discharge today from PT services. Treatment Charges: Mins Units   []  Modalities- CP     [x]  Ther Exercise 47 3   []  Manual Therapy     []  Ther Activities     []  Aquatics     [x]  Vasocompression 10 1   []  Other     Total Treatment time 57 4     Assessment: [x] Progressing toward goals.  Pt presents without pain, and reports good tolerance to last visit. However, reports \"pulling a muscle\" within his R flank over the weekend, waking with pain and symptoms on Monday morning, but have now resolved into today. Denies insult or injury to R shoulder or surgical site. Therefore, proceeded with treatment with caution. Pt able to perform previous interventions without difficulty, only fatigue with standing resisted R shoulder ABD to 90 deg. Proceeded with goal update and re-assessment of UEFS for discharge from PT services. Pt meets all therapy goals. Educated regarding discharge today from PT services secondary to meeting therapy goals, near resolution of pain/symptoms, independent with HEP, plans to travel to [place] for x1 month- verbalized understanding to all. Rec continued compliance with current HEP interventions in order to further R RTC strength over time- verbalized understanding. Discharge today from PT services. [] No change. [] Other:  [] Patient would continue to benefit from skilled physical therapy services in order to: in order to improve R shoulder A/PROM; R shoulder strength; and overall function of R UE.    STG: (to be met in 10 treatments)  1. ? Pain: Patient will report < 6/10 pain at worst over week in order to improve QOL.- MET   2. ? ROM: Will demo R shoulder PROM FLEX, SCAPTION, ER to equal L and with < 3/10 P in order to improve functional mobility.- MET   3. ? Strength: Will demo 4/5 gross R shoulder MMT with < 3/10 P in order to better match L and improve light ADLs through day. - MET   4. ? Function: Will report improved tolerance for sleeping without sling/ABD pillow- with pain < 4/10 and dec frequency of waking- for improved healing of surgical site and tolerance to PT services. -MET  5. Patient to be independent with home exercise program as demonstrated by performance with correct form without cues. - MET  LTG: (to be met in 24 treatments)  1.  Will demo R shoulder AROM to equal L and with < 3/10 P in order to improve

## 2020-10-13 ENCOUNTER — IMMUNIZATION (OUTPATIENT)
Dept: FAMILY MEDICINE CLINIC | Age: 70
End: 2020-10-13
Payer: MEDICARE

## 2020-10-13 PROCEDURE — G0008 ADMIN INFLUENZA VIRUS VAC: HCPCS | Performed by: FAMILY MEDICINE

## 2020-10-13 PROCEDURE — 90694 VACC AIIV4 NO PRSRV 0.5ML IM: CPT | Performed by: FAMILY MEDICINE

## 2020-10-26 RX ORDER — ATORVASTATIN CALCIUM 20 MG/1
TABLET, FILM COATED ORAL
Qty: 30 TABLET | Refills: 0 | Status: SHIPPED | OUTPATIENT
Start: 2020-10-26 | End: 2020-11-21

## 2020-11-21 RX ORDER — ATORVASTATIN CALCIUM 20 MG/1
TABLET, FILM COATED ORAL
Qty: 30 TABLET | Refills: 0 | Status: SHIPPED | OUTPATIENT
Start: 2020-11-21 | End: 2020-12-23

## 2020-11-23 RX ORDER — AMLODIPINE BESYLATE 10 MG/1
TABLET ORAL
Qty: 90 TABLET | Refills: 0 | Status: SHIPPED | OUTPATIENT
Start: 2020-11-23 | End: 2021-05-19

## 2020-11-23 RX ORDER — ENALAPRIL MALEATE 10 MG/1
TABLET ORAL
Qty: 90 TABLET | Refills: 0 | Status: SHIPPED | OUTPATIENT
Start: 2020-11-23 | End: 2021-05-19

## 2020-12-23 RX ORDER — ATORVASTATIN CALCIUM 20 MG/1
TABLET, FILM COATED ORAL
Qty: 30 TABLET | Refills: 5 | Status: SHIPPED | OUTPATIENT
Start: 2020-12-23 | End: 2021-06-23

## 2020-12-23 NOTE — TELEPHONE ENCOUNTER
Jere Ortiz is calling to request a refill on the following medication(s):    Medication Request:  Requested Prescriptions     Pending Prescriptions Disp Refills    atorvastatin (LIPITOR) 20 MG tablet [Pharmacy Med Name: Atorvastatin Calcium Oral Tablet 20 MG] 30 tablet 0     Sig: TAKE 1 TABLET BY MOUTH EVERY DAY       Last Visit Date (If Applicable):  0/22/6702    Next Visit Date:    2/22/2021

## 2021-01-26 ENCOUNTER — HOSPITAL ENCOUNTER (EMERGENCY)
Age: 71
Discharge: HOME | End: 2021-01-26
Attending: EMERGENCY MEDICINE
Payer: MEDICARE

## 2021-01-26 ENCOUNTER — APPOINTMENT (OUTPATIENT)
Dept: GENERAL RADIOLOGY | Age: 71
End: 2021-01-26
Payer: MEDICARE

## 2021-01-26 VITALS
WEIGHT: 230 LBS | BODY MASS INDEX: 36.1 KG/M2 | HEART RATE: 69 BPM | RESPIRATION RATE: 16 BRPM | HEIGHT: 67 IN | OXYGEN SATURATION: 95 % | TEMPERATURE: 98.4 F | SYSTOLIC BLOOD PRESSURE: 120 MMHG | DIASTOLIC BLOOD PRESSURE: 75 MMHG

## 2021-01-26 DIAGNOSIS — S61.512A WRIST LACERATION, LEFT, INITIAL ENCOUNTER: Primary | ICD-10-CM

## 2021-01-26 PROCEDURE — 99283 EMERGENCY DEPT VISIT LOW MDM: CPT

## 2021-01-26 PROCEDURE — 2500000003 HC RX 250 WO HCPCS: Performed by: PHYSICIAN ASSISTANT

## 2021-01-26 PROCEDURE — 6370000000 HC RX 637 (ALT 250 FOR IP): Performed by: PHYSICIAN ASSISTANT

## 2021-01-26 PROCEDURE — 12001 RPR S/N/AX/GEN/TRNK 2.5CM/<: CPT

## 2021-01-26 PROCEDURE — 73110 X-RAY EXAM OF WRIST: CPT

## 2021-01-26 RX ORDER — LIDOCAINE HYDROCHLORIDE 10 MG/ML
20 INJECTION, SOLUTION INFILTRATION; PERINEURAL ONCE
Status: COMPLETED | OUTPATIENT
Start: 2021-01-26 | End: 2021-01-26

## 2021-01-26 RX ORDER — GINSENG 100 MG
CAPSULE ORAL ONCE
Status: COMPLETED | OUTPATIENT
Start: 2021-01-26 | End: 2021-01-26

## 2021-01-26 RX ADMIN — BACITRACIN: 500 OINTMENT TOPICAL at 19:28

## 2021-01-26 RX ADMIN — LIDOCAINE HYDROCHLORIDE 20 ML: 10 INJECTION, SOLUTION EPIDURAL; INFILTRATION; INTRACAUDAL; PERINEURAL at 19:27

## 2021-01-26 ASSESSMENT — PAIN SCALES - GENERAL: PAINLEVEL_OUTOF10: 2

## 2021-01-26 NOTE — ED NOTES
Patient to ED d/t a laceration to his left wrist.  Patient started falling after tripping going down stairs at home and while reaching for railing struck a picture on the wall, breaking glass and causing laceration. Has DSD to site, no active bleeding, Tdap UTD.      Ayleen Thapa RN  01/26/21 2442

## 2021-01-26 NOTE — ED PROVIDER NOTES
[address] [facility] ED  [address] [facility] [address]  Phone: [phone]  Fax: [phone]      Attending Physician Attestation    I performed a history and physical examination of the patient and discussed management with the mid level provider. I reviewed the mid level provider's note and agree with the documented findings and plan of care. Any areas of disagreement are noted on the chart. I was personally present for the key portions of any procedures. I have documented in the chart those procedures where I was not present during the key portions. I have reviewed the emergency nurses triage note. I agree with the chief complaint, past medical history, past surgical history, allergies, medications, social and family history as documented unless otherwise noted below. Documentation of the HPI, Physical Exam and Medical Decision Making performed by mid level providers is based on my personal performance of the HPI, PE and MDM. For Physician Assistant/ Nurse Practitioner cases/documentation I have personally evaluated this patient and have completed at least one if not all key elements of the E/M (history, physical exam, and MDM). Additional findings are as noted. CHIEF COMPLAINT       Chief Complaint   Patient presents with    Laceration     left wrist       DIAGNOSTIC RESULTS     LABS:  Labs Reviewed - No data to display    All other labs were within normal range or not returned as of this dictation.     RADIOLOGY:  XR WRIST LEFT (MIN 3 VIEWS)    (Results Pending)         EMERGENCY DEPARTMENT COURSE:   Vitals:    Vitals:    01/26/21 1758   BP: (!) 146/84   Pulse: 71   Resp: 12   Temp: 98.4 °F (36.9 °C)   TempSrc: Oral   SpO2: 97%   Weight: 104.3 kg (230 lb)   Height: 5' 7\" (1.702 m)     -------------------------  BP: (!) 146/84, Temp: 98.4 °F (36.9 °C), Pulse: 71, Resp: 12             PERTINENT ATTENDING PHYSICIAN COMMENTS: 77-year-old male here with a laceration to the volar aspect of his left wrist.  Patient states that he struck his arm on a picture frame, and broke the glass, sustaining this laceration. He states he then fell, landing on his right shoulder. Patient is able to move the shoulder through full range of motion without difficulty. Did not strike his head. No loss of conscious. No neck, back, or abdominal pain. Distal motor and sensory are intact. Plan for an x-ray to rule out foreign body. Laceration repair. The patient's tetanus immunization is up-to-date.       (Please note that portions of this note were completed with a voice recognition program.  Efforts were made to edit the dictations but occasionally words are mis-transcribed.)    Rosa Guzman MD  Attending Emergency Medicine Physician       Rosa Guzman MD  01/26/21 7454

## 2021-01-26 NOTE — ED PROVIDER NOTES
[address] [facility] ED  [address] [facility] [address]  Phone: [phone]  Fax: Jerad Rose 112      Pt Name: Queen Silas  MRN: [id number]  [place] 1950  Date of evaluation: 1/26/21      CHIEF COMPLAINT:  Chief Complaint   Patient presents with    Laceration     left wrist       HISTORY OF PRESENT ILLNESS    Queen Silas is a 65 y.o. male who presents for evaluation of a laceration:     Location/Symptom:   Left wrist laceration  Timing/Onset: Just prior to arrival  Context/Setting:   Patient here with wife for evaluation of left wrist laceration as he was coming down the steps he slipped states he slipped/tripped on bottom step try to catch himself on the wall and put his hand on a framed picture. Sounds as though the glass broke and he cut his wrist.  He is denying any deficits of movement or sensation of this hand or wrist.  No focal weakness/paresthesias. Bleeding controlled with pressure. Tetanus UTD? YES,   Quality: sharp, achy  Duration: constant  Modifying Factors: worse with movement  Severity: moderate    Nursing Notes were reviewed. REVIEW OF SYSTEMS       Constitutional: Denies recent fever, chills. HEENT: No visual changes. Neck: No neck pain. Respiratory: Denies recent shortness of breath. Cardiac:  Denies recent chest pain. GI:  Denies abdominal pain/nausea/vomiting/diarrhea. Musculoskeletal: Denies focal weakness. Neurologic: Denies headache or focal weakness. Skin:  laceration    Negative in 10 essential Systems except as mentioned above and in the HPI. PAST MEDICAL HISTORY   PMH:  has a past medical history of Calcium pyrophosphate deposition disease, Diverticulosis of colon, Hypertension, and Osteoarthritis. Surgical History:  has a past surgical history that includes Pilonidal cyst excision; hernia repair; Total knee arthroplasty (Left, 1/19/2016); Colonoscopy (11/26/2008); pr colon ca scrn not hi rsk ind (N/A, 12/29/2017); Colonoscopy (12/29/2017); and Rotator cuff repair (Right, 06/2020). Social History:  reports that he quit smoking about 41 years ago. He has a 37.50 pack-year smoking history. He has never used smokeless tobacco. He reports that he does not drink alcohol or use drugs. Family History: None  Psychiatric History: None    Allergies:has No Known Allergies. PHYSICAL EXAM     INITIAL VITALS: BP (!) 146/84   Pulse 71   Temp 98.4 °F (36.9 °C) (Oral)   Resp 12   Ht 5' 7\" (1.702 m)   Wt 104.3 kg (230 lb)   SpO2 97%   BMI 36.02 kg/m²   Constitutional:  Well developed   Eyes:  Pupils equal/round  HENT:  Atraumatic scalp/face, external ears normal, nose normal  Respiratory:  Comfortable speech and breathing  Musculoskeletal:   Approximately ~ 1.5 cm V-shaped laceration of the left  volar radial aspect of the wrist.  Full range of motion of the wrist and all fingers on the left, there are no tenderness deficits. There is no active bleeding. I do not palpate any superficial foreign bodies. , NV Intact distally  Integument:  As above  Neurologic:  Alert, appropriate interaction/mentation, no focal deficits noted       DIAGNOSTIC RESULTS     EKG: All EKG's are interpreted by the Emergency Department Physician who either signs or Co-signs this chart in the absence of a cardiologist.  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 We will get an x-ray to rule out any foreign body. No tenderness deficits on exam.  Tetanus up-to-date. Discussed risk of tendinous injury not found on physical exam and during my thorough cleaning/exploration of wound. Pt/family was told to get re-evaluated if they experience loss of motion of their injured finger/toe. This may be associated with a new injury/\"popping\" sound or sensation, or with normal daily activities/lifting. I have reviewed the disposition diagnosis with the patient and or their family/guardian. I have discussed suture and wound care. I have answered their questions and given discharge instructions. They voiced understanding of these instructions and did not have any further questions or complaints. Laceration Repair Procedure Note    Indication: Laceration    Location:  Left wrist    Procedure: The patient was placed in the appropriate position and anesthesia around the laceration was obtained by infiltration using 1% Lidocaine without epinephrine. The area around wound(s) was then cleansed with betadine, draped in a sterile fashion and irrigated copiously with normal saline/betadine dilution. The wound(s) were explored well, no foreign bodies or tendon rupture/injury was found. The laceration was closed with 4-0 Prolene using interrupted sutures. There were no additional lacerations requiring repair. The wound area was then dressed with bacitracin. Total repaired wound length: 1.5 cm. Count: Suture count: 4    The patient tolerated the procedure well. Complications: None    Orders Placed This Encounter   Medications    bacitracin ointment    lidocaine 1 % injection 20 mL       CONSULTS:  None      FINAL IMPRESSION      1.  Wrist laceration, left, initial encounter          DISPOSITION/PLAN:  DISPOSITION Decision To Discharge 01/26/2021 07:11:45 PM        PATIENT REFERRED TO:  Constance Lawler MD [address]  [phone]    Schedule an appointment as soon as possible for a visit in 10 days  For suture/staple removal go to Family [facility] or UNRULY CAVAZOS [facility] [[email or web address]] MEDICATIONS:  New Prescriptions    No medications on file       (Please note that portions of this note were completed with a voice recognition program.  Efforts were made to edit the dictations but occasionally words are mis-transcribed.)    LUIS Garcia PA-C  01/26/21 1915

## 2021-02-04 ENCOUNTER — OFFICE VISIT (OUTPATIENT)
Dept: FAMILY MEDICINE CLINIC | Age: 71
End: 2021-02-04
Payer: MEDICARE

## 2021-02-04 VITALS
OXYGEN SATURATION: 97 % | SYSTOLIC BLOOD PRESSURE: 136 MMHG | TEMPERATURE: 97.9 F | HEART RATE: 67 BPM | BODY MASS INDEX: 37.93 KG/M2 | DIASTOLIC BLOOD PRESSURE: 82 MMHG | WEIGHT: 242.2 LBS

## 2021-02-04 DIAGNOSIS — Z51.89 VISIT FOR WOUND CHECK: ICD-10-CM

## 2021-02-04 DIAGNOSIS — S61.512D LACERATION OF LEFT WRIST, SUBSEQUENT ENCOUNTER: Primary | ICD-10-CM

## 2021-02-04 PROCEDURE — 3017F COLORECTAL CA SCREEN DOC REV: CPT | Performed by: NURSE PRACTITIONER

## 2021-02-04 PROCEDURE — 4040F PNEUMOC VAC/ADMIN/RCVD: CPT | Performed by: NURSE PRACTITIONER

## 2021-02-04 PROCEDURE — 99212 OFFICE O/P EST SF 10 MIN: CPT | Performed by: NURSE PRACTITIONER

## 2021-02-04 PROCEDURE — 1123F ACP DISCUSS/DSCN MKR DOCD: CPT | Performed by: NURSE PRACTITIONER

## 2021-02-04 PROCEDURE — G8427 DOCREV CUR MEDS BY ELIG CLIN: HCPCS | Performed by: NURSE PRACTITIONER

## 2021-02-04 PROCEDURE — G8484 FLU IMMUNIZE NO ADMIN: HCPCS | Performed by: NURSE PRACTITIONER

## 2021-02-04 PROCEDURE — G8417 CALC BMI ABV UP PARAM F/U: HCPCS | Performed by: NURSE PRACTITIONER

## 2021-02-04 PROCEDURE — 1036F TOBACCO NON-USER: CPT | Performed by: NURSE PRACTITIONER

## 2021-02-04 ASSESSMENT — PATIENT HEALTH QUESTIONNAIRE - PHQ9
SUM OF ALL RESPONSES TO PHQ QUESTIONS 1-9: 0
SUM OF ALL RESPONSES TO PHQ9 QUESTIONS 1 & 2: 0

## 2021-02-04 NOTE — PROGRESS NOTES
Shivani Kemp, FANNYP-C  [address]  [address] [address] [facility] [place]  [place], [place] [address]  [phone]  [phone]    Helen Altamirano is a 65 y.o. male who is here with c/o of:    Chief Complaint: Wound Check (LT wrist)      Patient Accompanied by: n/a    HPI - Helen Altmairano is here today for f/u    Patient was seen and treated in the ED on 2021 for laceration to left wrist resulting from a slipping down the bottom step at his home. Reports he caught himself on the wall with a picture and the glass broke cutting his wrist. 4 sutures were used to close the wound. He denies any pain, redness, or swelling to the wound. He has been keeping clean and dry and using antibiotic ointment. Patient Active Problem List:     History of calcium pyrophosphate deposition disease (CPPD)     Inflammatory arthropathy     Diverticulosis of colon     Pure hypercholesterolemia     Essential hypertension     IFG (impaired fasting glucose)     Past Medical History:   Diagnosis Date    Calcium pyrophosphate deposition disease     Diverticulosis of colon     Hypertension     Osteoarthritis       Past Surgical History:   Procedure Laterality Date    COLONOSCOPY  2008    diverticulosis    COLONOSCOPY  2017    Diverticulosis    HERNIA REPAIR      PILONIDAL CYST EXCISION      MA COLON CA SCRN NOT  W [address] [place] [address] N/A 2017    COLONOSCOPY performed by Elvin Martinez MD at [address] [place] [address] Right 2020    TOTAL KNEE ARTHROPLASTY Left 2016    [place]/Dr. Mac     No family history on file.   Social History     Tobacco Use    Smoking status: Former Smoker     Packs/day: 1.50     Years: 25.00     Pack years: 37.50     Quit date:      Years since quittin.1    Smokeless tobacco: Never Used    Tobacco comment: estimated quit date   Substance Use Topics    Alcohol use: No     ALLERGIES:  No Known Allergies       Subjective · Constitutional:  Negative for activity change, appetite change,unexpected weight change, chills, fever, and fatigue. · HENT: Negative for ear pain, sore throat,  Rhinorrhea, sinus pain, sinus pressure, congestion. · Eyes:  Negative for pain and discharge. · Respiratory:  Negative for chest tightness, shortness of breath, wheezing, and cough. · Cardiovascular:  Negative for chest pain, palpitations and leg swelling. · Gastrointestinal: Negative for abdominal pain, blood in stool, constipation,diarrhea, nausea and vomiting. · Endocrine: Negative for cold intolerance, heat intolerance, polydipsia, polyphagia and polyuria. · Genitourinary: Negative for difficulty urinating, dysuria, flank pain, frequency, hematuria and urgency. · Musculoskeletal: Negative for arthralgias, back pain, joint swelling, myalgias, neck pain and neck stiffness. · Skin: Negative for rash and Positive for wound. · Allergic/Immunologic: Negative for environmental allergies and food allergies. · Neurological:  Negative for dizziness, light-headedness, numbness and headaches. · Hematological:  Negative for adenopathy. Does not bruise/bleed easily. · Psychiatric/Behavioral: Negative for self-injury, sleep disturbance and suicidal ideas. Objective     PHYSICAL EXAM:   · Constitutional: Shirley Allen is oriented to person, place, and time. Vital signs are normal. Appears well-developed and well-nourished. · HEENT:   · Head: Normocephalic and atraumatic. Right Ear: Hearing and external ear normal.     · Left Ear: Hearing and external ear normal.    · Eyes:PERRL, EOMI, Conjunctiva normal, No discharge. · Neck: Full passive range of motion. · Neurological: Alert and oriented to person, place, and time. Normal motor function, Normal sensory function, No focal deficits noted. He has normal strength. Skin: Skin is warm, dry and intact.  Physical Exam  Skin: · Psychiatric: Normal mood and affect. Speech is normal and behavior is normal.     Nursing note and vitals reviewed. Blood pressure 136/82, pulse 67, temperature 97.9 °F (36.6 °C), temperature source Temporal, weight 242 lb 3.2 oz (109.9 kg), SpO2 97 %. Body mass index is 37.93 kg/m². Wt Readings from Last 3 Encounters:   02/04/21 242 lb 3.2 oz (109.9 kg)   01/26/21 230 lb (104.3 kg)   08/21/20 222 lb 3.2 oz (100.8 kg)     BP Readings from Last 3 Encounters:   02/04/21 136/82   01/26/21 120/75   08/21/20 132/84       No results found for this visit on 02/04/21. Completed Orders/Prescriptions   No orders of the defined types were placed in this encounter. AssessmentPlan/Medical Decision Making     1. Laceration of left wrist, subsequent encounter  - completely healed  - no signs of infection  - reviewed red flag symptoms for f/u  -  - IL REMOVAL OF SUTURES    2. Visit for wound check  - healed      Return if symptoms worsen or fail to improve. 1.  Jayy Ocampo received counseling on the following healthy behaviors: nutrition, exercise and medication adherence  2. Patient given educational materials - see patient instructions  3. Was a self-tracking handout given in paper form or via "[employer]"t? No  If yes, see orders or list here. 4.  Discussed use, benefit, and side effects of prescribed medications. Barriers to medication compliance addressed. All patient questions answered. Pt voiced understanding. 5.  Reviewed prior labs, imaging, consultation, follow up, and health maintenance  6. Continue current medications, diet and exercise. 7. Discussed use, benefit, and side effects of prescribed medications. Barriers to medication compliance addressed. All her questions were answered. Pt voiced understanding. Jayy Ocampo will continue current medications, diet and exercise.     Patient given educational materials on wound care Of the 10 minute duration appointment visit, Severa Meres, CNP spent at least 50% of the face-to-face time in counseling, explanation of diagnosis, planning of further management, and answering all questions. Signed:  Severa Meres, CNP    This note is created with the assistance of a speech-recognition program.  While intending to generate a document that actually reflects the content of the visit, no guarantees can be provided that every mistake has been identified and corrected by editing.

## 2021-02-19 ENCOUNTER — IMMUNIZATION (OUTPATIENT)
Dept: FAMILY MEDICINE CLINIC | Age: 71
End: 2021-02-19
Payer: MEDICARE

## 2021-02-19 PROCEDURE — 0001A COVID-19, PFIZER VACCINE 30MCG/0.3ML DOSE: CPT | Performed by: INTERNAL MEDICINE

## 2021-02-19 PROCEDURE — 91300 COVID-19, PFIZER VACCINE 30MCG/0.3ML DOSE: CPT | Performed by: INTERNAL MEDICINE

## 2021-02-22 ENCOUNTER — OFFICE VISIT (OUTPATIENT)
Dept: FAMILY MEDICINE CLINIC | Age: 71
End: 2021-02-22
Payer: MEDICARE

## 2021-02-22 VITALS
OXYGEN SATURATION: 98 % | TEMPERATURE: 97 F | SYSTOLIC BLOOD PRESSURE: 136 MMHG | BODY MASS INDEX: 37.65 KG/M2 | WEIGHT: 240.38 LBS | HEART RATE: 56 BPM | DIASTOLIC BLOOD PRESSURE: 76 MMHG

## 2021-02-22 DIAGNOSIS — I10 ESSENTIAL HYPERTENSION: Primary | ICD-10-CM

## 2021-02-22 DIAGNOSIS — E78.00 PURE HYPERCHOLESTEROLEMIA: ICD-10-CM

## 2021-02-22 DIAGNOSIS — M19.90 INFLAMMATORY ARTHROPATHY: ICD-10-CM

## 2021-02-22 PROCEDURE — 3017F COLORECTAL CA SCREEN DOC REV: CPT | Performed by: FAMILY MEDICINE

## 2021-02-22 PROCEDURE — 1036F TOBACCO NON-USER: CPT | Performed by: FAMILY MEDICINE

## 2021-02-22 PROCEDURE — 4040F PNEUMOC VAC/ADMIN/RCVD: CPT | Performed by: FAMILY MEDICINE

## 2021-02-22 PROCEDURE — G8417 CALC BMI ABV UP PARAM F/U: HCPCS | Performed by: FAMILY MEDICINE

## 2021-02-22 PROCEDURE — G8484 FLU IMMUNIZE NO ADMIN: HCPCS | Performed by: FAMILY MEDICINE

## 2021-02-22 PROCEDURE — G8427 DOCREV CUR MEDS BY ELIG CLIN: HCPCS | Performed by: FAMILY MEDICINE

## 2021-02-22 PROCEDURE — 99214 OFFICE O/P EST MOD 30 MIN: CPT | Performed by: FAMILY MEDICINE

## 2021-02-22 PROCEDURE — 1123F ACP DISCUSS/DSCN MKR DOCD: CPT | Performed by: FAMILY MEDICINE

## 2021-02-22 ASSESSMENT — PATIENT HEALTH QUESTIONNAIRE - PHQ9
1. LITTLE INTEREST OR PLEASURE IN DOING THINGS: 0
2. FEELING DOWN, DEPRESSED OR HOPELESS: 0
SUM OF ALL RESPONSES TO PHQ QUESTIONS 1-9: 0
SUM OF ALL RESPONSES TO PHQ QUESTIONS 1-9: 0
SUM OF ALL RESPONSES TO PHQ9 QUESTIONS 1 & 2: 0

## 2021-02-22 NOTE — PROGRESS NOTES
Subjective:  Sheri Martin presents for   Chief Complaint   Patient presents with    Hypertension    Hyperlipidemia   doing well. Got covid vaccine #1 last week. Tolerating the meds    Seeing rheumatology. Patient Active Problem List   Diagnosis    History of calcium pyrophosphate deposition disease (CPPD)    Inflammatory arthropathy    Diverticulosis of colon    Pure hypercholesterolemia    Essential hypertension    IFG (impaired fasting glucose)         Review of Systems:  · General: no significant weight changes. · Respiratory: no cough, pleuritic chest pain, dyspnea, or wheezing  · Cardiovascular: no pain, CADE, orthopnea, palpitations or claudication  · Gastrointestinal: no chronic nausea, vomiting, heartburn, diarrhea, constipation, bloating, or abdominal pain. No bloody or black stools. Objective:  Physical Exam   Vitals:   Vitals:    02/22/21 0811   BP: 136/76   Pulse: 56   Temp: 97 °F (36.1 °C)   TempSrc: Temporal   SpO2: 98%   Weight: 240 lb 6 oz (109 kg)     Wt Readings from Last 3 Encounters:   02/22/21 240 lb 6 oz (109 kg)   02/04/21 242 lb 3.2 oz (109.9 kg)   01/26/21 230 lb (104.3 kg)     Ht Readings from Last 3 Encounters:   01/26/21 5' 7\" (1.702 m)   04/11/19 5' 8\" (1.727 m)   12/29/17 5' 7\" (1.702 m)     Body mass index is 37.65 kg/m². Constitutional: He is oriented to person, place, and time. He appears well-developed and well-nourished and in no acute distress. Answers all my questions appropriately. Head: Normocephalic and atraumatic. Eyes:conjunctiva appear normal.  Nose: pink, non-edematous mucosa. No polyps. No septal deviation  Throat: no erythema, tonsillar hypertrophy or exudate. No ulcerations noted. Lips/Teeth/Gums all appear normal.  Neck: Normal range of motion. Neck supple. No tracheal deviation present. No abnormal lymphadenopathy. No JVD noted. Carotids are clear bilaterally. No thyroid masses noted. Heart: RRR without murmur. No S3, S4, or gallop noted. Chest: Clear to auscultation bilaterally. Good breath sounds noted. No rales, wheezes, or rhonchi noted. No respiratory retractions noted. Wall has symmetrical movement with respirations. No pedal edema  Assessment:   Encounter Diagnoses   Name Primary?  Essential hypertension Yes    Pure hypercholesterolemia     Inflammatory arthropathy          Plan:   There are no discontinued medications. THE ABOVE NOTED DISCONTINUED MEDS MAY ONLY BE FROM 'CLEANING UP' THE MED LIST AND WERE NOT ACTUALLY CANCELED;  SEE CHART FOR DETAILS! No orders of the defined types were placed in this encounter. Orders Placed This Encounter   Procedures    Comprehensive Metabolic Panel     Standing Status:   Future     Standing Expiration Date:   2/22/2022    CBC Auto Differential     Standing Status:   Future     Standing Expiration Date:   2/22/2022    Lipid Panel     Standing Status:   Future     Standing Expiration Date:   2/22/2022     Order Specific Question:   Is Patient Fasting?/# of Hours     Answer:   8    TSH with Reflex     Standing Status:   Future     Standing Expiration Date:   2/22/2022     No follow-ups on file. There are no Patient Instructions on file for this visit.   Data Unavailable      rv in 6 months

## 2021-03-11 ENCOUNTER — IMMUNIZATION (OUTPATIENT)
Dept: FAMILY MEDICINE CLINIC | Age: 71
End: 2021-03-11
Payer: MEDICARE

## 2021-03-11 PROCEDURE — 91300 COVID-19, PFIZER VACCINE 30MCG/0.3ML DOSE: CPT | Performed by: INTERNAL MEDICINE

## 2021-03-11 PROCEDURE — 0002A COVID-19, PFIZER VACCINE 30MCG/0.3ML DOSE: CPT | Performed by: INTERNAL MEDICINE

## 2021-05-19 RX ORDER — AMLODIPINE BESYLATE 10 MG/1
TABLET ORAL
Qty: 90 TABLET | Refills: 0 | Status: SHIPPED | OUTPATIENT
Start: 2021-05-19 | End: 2021-08-17

## 2021-05-19 RX ORDER — ENALAPRIL MALEATE 10 MG/1
TABLET ORAL
Qty: 90 TABLET | Refills: 0 | Status: SHIPPED | OUTPATIENT
Start: 2021-05-19 | End: 2021-08-17

## 2021-05-19 NOTE — TELEPHONE ENCOUNTER
Sofy Dunlap is calling to request a refill on the following medication(s):    Medication Request:  Requested Prescriptions     Pending Prescriptions Disp Refills    enalapril (VASOTEC) 10 MG tablet [Pharmacy Med Name: Enalapril Maleate Oral Tablet 10 MG] 90 tablet 0     Sig: TAKE 1 TABLET BY MOUTH EVERY DAY    amLODIPine (NORVASC) 10 MG tablet [Pharmacy Med Name: amLODIPine Besylate Oral Tablet 10 MG] 90 tablet 0     Sig: TAKE 1 TABLET BY MOUTH EVERY DAY    metoprolol tartrate (LOPRESSOR) 25 MG tablet [Pharmacy Med Name: Metoprolol Tartrate Oral Tablet 25 MG] 180 tablet 0     Sig: TAKE 1 TABLET BY MOUTH TWO TIMES A DAY       Last Visit Date (If Applicable):  7/57/3104    Next Visit Date:    Visit date not found

## 2021-06-23 RX ORDER — ATORVASTATIN CALCIUM 20 MG/1
TABLET, FILM COATED ORAL
Qty: 30 TABLET | Refills: 0 | Status: SHIPPED | OUTPATIENT
Start: 2021-06-23 | End: 2021-07-22

## 2021-06-23 NOTE — TELEPHONE ENCOUNTER
Taya Santana is calling to request a refill on the following medication(s):    Medication Request:  Requested Prescriptions     Pending Prescriptions Disp Refills    atorvastatin (LIPITOR) 20 MG tablet [Pharmacy Med Name: Atorvastatin Calcium Oral Tablet 20 MG] 30 tablet 0     Sig: TAKE 1 TABLET BY MOUTH EVERY DAY       Last Visit Date (If Applicable):  3/13/5587    Next Visit Date:    Visit date not found

## 2021-07-22 RX ORDER — ATORVASTATIN CALCIUM 20 MG/1
TABLET, FILM COATED ORAL
Qty: 30 TABLET | Refills: 1 | Status: SHIPPED | OUTPATIENT
Start: 2021-07-22 | End: 2021-09-23

## 2021-07-22 NOTE — TELEPHONE ENCOUNTER
Ever Case is calling to request a refill on the following medication(s):    Medication Request:  Requested Prescriptions     Pending Prescriptions Disp Refills    atorvastatin (LIPITOR) 20 MG tablet [Pharmacy Med Name: Atorvastatin Calcium Oral Tablet 20 MG] 30 tablet 0     Sig: TAKE 1 TABLET BY MOUTH EVERY DAY       Last Visit Date (If Applicable):  9/43/8674    Next Visit Date:    Visit date not found

## 2021-08-17 RX ORDER — AMLODIPINE BESYLATE 10 MG/1
TABLET ORAL
Qty: 90 TABLET | Refills: 0 | Status: SHIPPED | OUTPATIENT
Start: 2021-08-17 | End: 2021-08-18

## 2021-08-17 RX ORDER — ENALAPRIL MALEATE 10 MG/1
TABLET ORAL
Qty: 90 TABLET | Refills: 0 | Status: SHIPPED | OUTPATIENT
Start: 2021-08-17 | End: 2021-08-18

## 2021-08-18 RX ORDER — ENALAPRIL MALEATE 10 MG/1
TABLET ORAL
Qty: 90 TABLET | Refills: 0 | Status: SHIPPED | OUTPATIENT
Start: 2021-08-18 | End: 2021-08-18

## 2021-08-18 RX ORDER — AMLODIPINE BESYLATE 10 MG/1
TABLET ORAL
Qty: 90 TABLET | Refills: 0 | Status: SHIPPED | OUTPATIENT
Start: 2021-08-18 | End: 2021-11-17

## 2021-08-18 RX ORDER — ENALAPRIL MALEATE 10 MG/1
TABLET ORAL
Qty: 90 TABLET | Refills: 0 | Status: SHIPPED | OUTPATIENT
Start: 2021-08-18 | End: 2022-02-10

## 2021-08-18 NOTE — TELEPHONE ENCOUNTER
Luma Arredondo is calling to request a refill on the following medication(s):    Medication Request:  Requested Prescriptions     Pending Prescriptions Disp Refills    amLODIPine (NORVASC) 10 MG tablet [Pharmacy Med Name: amLODIPine Besylate Oral Tablet 10 MG] 90 tablet 0     Sig: TAKE 1 TABLET BY MOUTH EVERY DAY    enalapril (VASOTEC) 10 MG tablet [Pharmacy Med Name: Enalapril Maleate Oral Tablet 10 MG] 90 tablet 0     Sig: TAKE 1 TABLET BY MOUTH EVERY DAY       Last Visit Date (If Applicable):  5/98/7740    Next Visit Date:    Visit date not found

## 2021-09-22 DIAGNOSIS — I10 ESSENTIAL HYPERTENSION: ICD-10-CM

## 2021-09-22 LAB
ALBUMIN SERPL-MCNC: 4.2 G/DL
ALP BLD-CCNC: 67 U/L
ALT SERPL-CCNC: 17 U/L
ANION GAP SERPL CALCULATED.3IONS-SCNC: 11 MMOL/L
AST SERPL-CCNC: 20 U/L
BASOPHILS ABSOLUTE: 0.1 /ΜL
BASOPHILS RELATIVE PERCENT: 1.2 %
BILIRUB SERPL-MCNC: 0.6 MG/DL (ref 0.1–1.4)
BUN BLDV-MCNC: 18 MG/DL
CALCIUM SERPL-MCNC: 9.2 MG/DL
CHLORIDE BLD-SCNC: 108 MMOL/L
CHOLESTEROL, TOTAL: 129 MG/DL
CHOLESTEROL/HDL RATIO: NORMAL
CO2: 23 MMOL/L
CREAT SERPL-MCNC: 1.04 MG/DL
EOSINOPHILS ABSOLUTE: 0.3 /ΜL
EOSINOPHILS RELATIVE PERCENT: 5.1 %
GFR CALCULATED: >60
GLUCOSE BLD-MCNC: 108 MG/DL
HCT VFR BLD CALC: 39.1 % (ref 41–53)
HDLC SERPL-MCNC: 44 MG/DL (ref 35–70)
HEMOGLOBIN: 13.1 G/DL (ref 13.5–17.5)
LDL CHOLESTEROL CALCULATED: 70 MG/DL (ref 0–160)
LYMPHOCYTES ABSOLUTE: 1.6 /ΜL
LYMPHOCYTES RELATIVE PERCENT: 26.3 %
MCH RBC QN AUTO: 30.1 PG
MCHC RBC AUTO-ENTMCNC: 33.4 G/DL
MCV RBC AUTO: 90 FL
MONOCYTES ABSOLUTE: 0.6 /ΜL
MONOCYTES RELATIVE PERCENT: 10.2 %
NEUTROPHILS ABSOLUTE: 3.5 /ΜL
NEUTROPHILS RELATIVE PERCENT: 57.2 %
NONHDLC SERPL-MCNC: NORMAL MG/DL
PDW BLD-RTO: 14.8 %
PLATELET # BLD: 233 K/ΜL
PMV BLD AUTO: 9.1 FL
POTASSIUM SERPL-SCNC: 4.3 MMOL/L
RBC # BLD: 4.34 10^6/ΜL
SODIUM BLD-SCNC: 142 MMOL/L
TOTAL PROTEIN: 6.4
TRIGL SERPL-MCNC: 77 MG/DL
TSH SERPL DL<=0.05 MIU/L-ACNC: 1.99 UIU/ML
VLDLC SERPL CALC-MCNC: 15 MG/DL
WBC # BLD: 6.1 10^3/ML

## 2021-09-23 RX ORDER — ATORVASTATIN CALCIUM 20 MG/1
TABLET, FILM COATED ORAL
Qty: 30 TABLET | Refills: 0 | Status: SHIPPED | OUTPATIENT
Start: 2021-09-23 | End: 2021-10-25

## 2021-10-18 ENCOUNTER — TELEPHONE (OUTPATIENT)
Dept: FAMILY MEDICINE CLINIC | Age: 71
End: 2021-10-18

## 2021-10-18 RX ORDER — METHYLPREDNISOLONE SODIUM SUCCINATE 125 MG/2ML
125 INJECTION, POWDER, LYOPHILIZED, FOR SOLUTION INTRAMUSCULAR; INTRAVENOUS
Status: CANCELLED | OUTPATIENT
Start: 2021-10-18 | End: 2021-10-18

## 2021-10-18 RX ORDER — EPINEPHRINE 1 MG/ML
0.3 INJECTION, SOLUTION, CONCENTRATE INTRAVENOUS PRN
Status: CANCELLED | OUTPATIENT
Start: 2021-10-18

## 2021-10-18 RX ORDER — SODIUM CHLORIDE 9 MG/ML
INJECTION, SOLUTION INTRAVENOUS CONTINUOUS
Status: CANCELLED | OUTPATIENT
Start: 2021-10-18 | End: 2021-10-19

## 2021-10-18 RX ORDER — DIPHENHYDRAMINE HYDROCHLORIDE 50 MG/ML
50 INJECTION INTRAMUSCULAR; INTRAVENOUS
Status: CANCELLED | OUTPATIENT
Start: 2021-10-18 | End: 2021-10-18

## 2021-10-18 NOTE — TELEPHONE ENCOUNTER
Please call [address] [place] at [phone] and talk to the pharmacist on getting this process started.   Once you notify them about this patient they usually take it from there

## 2021-10-18 NOTE — TELEPHONE ENCOUNTER
[facility] informed, and they stated they will call the patient once everything is ordered.

## 2021-10-18 NOTE — TELEPHONE ENCOUNTER
Patient informed, he states he would be interested. He wants to know exactly what is it and what needs to be done, please advise.

## 2021-10-20 ENCOUNTER — HOSPITAL ENCOUNTER (OUTPATIENT)
Dept: INFUSION THERAPY | Age: 71
Setting detail: INFUSION SERIES
Discharge: HOME | End: 2021-10-20
Payer: MEDICARE

## 2021-10-20 VITALS
DIASTOLIC BLOOD PRESSURE: 86 MMHG | HEART RATE: 65 BPM | SYSTOLIC BLOOD PRESSURE: 126 MMHG | TEMPERATURE: 98.2 F | RESPIRATION RATE: 16 BRPM | OXYGEN SATURATION: 97 %

## 2021-10-20 PROCEDURE — 96365 THER/PROPH/DIAG IV INF INIT: CPT

## 2021-10-20 PROCEDURE — 2580000003 HC RX 258: Performed by: FAMILY MEDICINE

## 2021-10-20 PROCEDURE — M0240 HC IV INFUSION CASIRIVIMAB AND IMDEVIMAB W/MONITORING SUBSEQUENT: HCPCS

## 2021-10-20 PROCEDURE — 6360000002 HC RX W HCPCS: Performed by: FAMILY MEDICINE

## 2021-10-20 RX ORDER — SODIUM CHLORIDE 9 MG/ML
INJECTION, SOLUTION INTRAVENOUS CONTINUOUS
Status: ACTIVE | OUTPATIENT
Start: 2021-10-20 | End: 2021-10-20

## 2021-10-20 RX ORDER — DIPHENHYDRAMINE HYDROCHLORIDE 50 MG/ML
50 INJECTION INTRAMUSCULAR; INTRAVENOUS
Status: ACTIVE | OUTPATIENT
Start: 2021-10-20 | End: 2021-10-20

## 2021-10-20 RX ORDER — METHYLPREDNISOLONE SODIUM SUCCINATE 125 MG/2ML
125 INJECTION, POWDER, LYOPHILIZED, FOR SOLUTION INTRAMUSCULAR; INTRAVENOUS
Status: ACTIVE | OUTPATIENT
Start: 2021-10-20 | End: 2021-10-20

## 2021-10-20 RX ORDER — EPINEPHRINE 1 MG/ML
0.3 INJECTION, SOLUTION, CONCENTRATE INTRAVENOUS PRN
Status: DISCONTINUED | OUTPATIENT
Start: 2021-10-20 | End: 2021-10-21 | Stop reason: HOSPADM

## 2021-10-20 RX ADMIN — SODIUM CHLORIDE: 9 INJECTION, SOLUTION INTRAVENOUS at 08:57

## 2021-10-20 RX ADMIN — CASIRIVIMAB AND IMDEVIMAB: 600; 600 INJECTION, SOLUTION, CONCENTRATE INTRAVENOUS at 08:54

## 2021-10-25 RX ORDER — ATORVASTATIN CALCIUM 20 MG/1
TABLET, FILM COATED ORAL
Qty: 30 TABLET | Refills: 0 | Status: SHIPPED | OUTPATIENT
Start: 2021-10-25 | End: 2021-11-12

## 2021-11-17 RX ORDER — AMLODIPINE BESYLATE 10 MG/1
TABLET ORAL
Qty: 90 TABLET | Refills: 0 | Status: SHIPPED | OUTPATIENT
Start: 2021-11-17 | End: 2022-05-09

## 2021-11-17 NOTE — LETTER
[facility]  [address] [address]  [address]    11/17/2021    Glenis De Luna  [address]  [place] [address]        Dear Glenis De Luna :    In addition to helping you feel better when you are sick, we are interested in preventing illness and injury in the first place. In the spirit of maintaining your good health, your record indicates that you are due for an appointment. Please call [phone] to schedule. I look forward to seeing you soon.     Sincerely,         Sammie Simms MD/IRASEMA

## 2021-11-17 NOTE — TELEPHONE ENCOUNTER
Arianne Rivera is calling to request a refill on the following medication(s):    Medication Request:  Requested Prescriptions     Pending Prescriptions Disp Refills    metoprolol tartrate (LOPRESSOR) 25 MG tablet [Pharmacy Med Name: Metoprolol Tartrate Oral Tablet 25 MG] 180 tablet 0     Sig: TAKE 1 TABLET BY MOUTH TWO TIMES A DAY    amLODIPine (NORVASC) 10 MG tablet [Pharmacy Med Name: amLODIPine Besylate Oral Tablet 10 MG] 90 tablet 0     Sig: TAKE 1 TABLET BY MOUTH EVERY DAY       Last Visit Date (If Applicable):  3/73/5766    Next Visit Date:    Visit date not found

## 2021-12-08 ENCOUNTER — OFFICE VISIT (OUTPATIENT)
Dept: FAMILY MEDICINE CLINIC | Age: 71
End: 2021-12-08
Payer: MEDICARE

## 2021-12-08 VITALS
BODY MASS INDEX: 33.52 KG/M2 | DIASTOLIC BLOOD PRESSURE: 76 MMHG | SYSTOLIC BLOOD PRESSURE: 138 MMHG | WEIGHT: 214 LBS | OXYGEN SATURATION: 96 % | HEART RATE: 60 BPM

## 2021-12-08 DIAGNOSIS — I10 ESSENTIAL HYPERTENSION: Primary | ICD-10-CM

## 2021-12-08 DIAGNOSIS — M19.90 INFLAMMATORY ARTHROPATHY: ICD-10-CM

## 2021-12-08 DIAGNOSIS — E78.00 PURE HYPERCHOLESTEROLEMIA: ICD-10-CM

## 2021-12-08 PROCEDURE — 3017F COLORECTAL CA SCREEN DOC REV: CPT | Performed by: FAMILY MEDICINE

## 2021-12-08 PROCEDURE — 4040F PNEUMOC VAC/ADMIN/RCVD: CPT | Performed by: FAMILY MEDICINE

## 2021-12-08 PROCEDURE — 90694 VACC AIIV4 NO PRSRV 0.5ML IM: CPT | Performed by: FAMILY MEDICINE

## 2021-12-08 PROCEDURE — 99214 OFFICE O/P EST MOD 30 MIN: CPT | Performed by: FAMILY MEDICINE

## 2021-12-08 PROCEDURE — 1036F TOBACCO NON-USER: CPT | Performed by: FAMILY MEDICINE

## 2021-12-08 PROCEDURE — G0008 ADMIN INFLUENZA VIRUS VAC: HCPCS | Performed by: FAMILY MEDICINE

## 2021-12-08 PROCEDURE — 1123F ACP DISCUSS/DSCN MKR DOCD: CPT | Performed by: FAMILY MEDICINE

## 2021-12-08 PROCEDURE — G8484 FLU IMMUNIZE NO ADMIN: HCPCS | Performed by: FAMILY MEDICINE

## 2021-12-08 PROCEDURE — G8417 CALC BMI ABV UP PARAM F/U: HCPCS | Performed by: FAMILY MEDICINE

## 2021-12-08 PROCEDURE — G8427 DOCREV CUR MEDS BY ELIG CLIN: HCPCS | Performed by: FAMILY MEDICINE

## 2021-12-08 RX ORDER — ANTIOX #8/OM3/DHA/EPA/LUT/ZEAX 250-2.5 MG
2 CAPSULE ORAL DAILY
COMMUNITY
End: 2022-03-25

## 2021-12-08 SDOH — ECONOMIC STABILITY: FOOD INSECURITY: WITHIN THE PAST 12 MONTHS, THE FOOD YOU BOUGHT JUST DIDN'T LAST AND YOU DIDN'T HAVE MONEY TO GET MORE.: NEVER TRUE

## 2021-12-08 SDOH — ECONOMIC STABILITY: FOOD INSECURITY: WITHIN THE PAST 12 MONTHS, YOU WORRIED THAT YOUR FOOD WOULD RUN OUT BEFORE YOU GOT MONEY TO BUY MORE.: NEVER TRUE

## 2021-12-08 ASSESSMENT — PATIENT HEALTH QUESTIONNAIRE - PHQ9
SUM OF ALL RESPONSES TO PHQ QUESTIONS 1-9: 0
SUM OF ALL RESPONSES TO PHQ9 QUESTIONS 1 & 2: 0
2. FEELING DOWN, DEPRESSED OR HOPELESS: 0
SUM OF ALL RESPONSES TO PHQ QUESTIONS 1-9: 0
1. LITTLE INTEREST OR PLEASURE IN DOING THINGS: 0
SUM OF ALL RESPONSES TO PHQ QUESTIONS 1-9: 0

## 2021-12-08 ASSESSMENT — SOCIAL DETERMINANTS OF HEALTH (SDOH): HOW HARD IS IT FOR YOU TO PAY FOR THE VERY BASICS LIKE FOOD, HOUSING, MEDICAL CARE, AND HEATING?: NOT HARD AT ALL

## 2021-12-08 NOTE — PROGRESS NOTES
Subjective:  Jazmine Griffith presents for   Chief Complaint   Patient presents with    Hypertension    Hyperlipidemia   bp at home is good    Tolerating the meds    See's the rheumatologist every 4 months      Overall he is doing well and functioning well with the meds  Patient Active Problem List   Diagnosis    History of calcium pyrophosphate deposition disease (CPPD)    Inflammatory arthropathy    Diverticulosis of colon    Pure hypercholesterolemia    Essential hypertension    IFG (impaired fasting glucose)         Review of Systems:  · General: no significant weight changes. · Respiratory: no cough, pleuritic chest pain, dyspnea, or wheezing  · Cardiovascular: no pain, CADE, orthopnea, palpitations or claudication  · Gastrointestinal: no chronic nausea, vomiting, heartburn, diarrhea, constipation, bloating, or abdominal pain. No bloody or black stools. Objective:  Physical Exam   Vitals:   Vitals:    12/08/21 1122   BP: 138/76   Pulse: 60   SpO2: 96%   Weight: 214 lb (97.1 kg)     Wt Readings from Last 3 Encounters:   12/08/21 214 lb (97.1 kg)   02/22/21 240 lb 6 oz (109 kg)   02/04/21 242 lb 3.2 oz (109.9 kg)     Ht Readings from Last 3 Encounters:   01/26/21 5' 7\" (1.702 m)   04/11/19 5' 8\" (1.727 m)   12/29/17 5' 7\" (1.702 m)     Body mass index is 33.52 kg/m². Constitutional: He is oriented to person, place, and time. He appears well-developed and well-nourished and in no acute distress. Answers all my questions appropriately. Head: Normocephalic and atraumatic. Eyes:conjunctiva appear normal.    Nose: pink, non-edematous mucosa. No polyps. No septal deviation    Throat: no erythema, tonsillar hypertrophy or exudate. No ulcerations noted. Lips/Teeth/Gums all appear normal.    Neck: Normal range of motion. Neck supple. No tracheal deviation present. No abnormal lymphadenopathy. No JVD noted. Carotids are clear bilaterally. No thyroid masses noted.     Heart: RRR without murmur. No S3, S4, or gallop noted. Chest:   Good breath sounds noted. Clear to auscultation bilaterally. No rales, wheezes, or rhonchi noted. No respiratory retractions noted. Wall has symmetrical movement with respirations. No pedal edema  Assessment:   Encounter Diagnoses   Name Primary?  Essential hypertension Yes    Pure hypercholesterolemia     Inflammatory arthropathy          Plan:   There are no discontinued medications. THE ABOVE NOTED DISCONTINUED MEDS MAY ONLY BE FROM 'CLEANING UP' THE MED LIST AND WERE NOT ACTUALLY CANCELED;  SEE CHART FOR DETAILS! No orders of the defined types were placed in this encounter. Orders Placed This Encounter   Procedures    INFLUENZA, QUADV, ADJUVANTED, 65 YRS =, IM, PF, PREFILL SYR, 0.5ML (FLUAD)     Return in about 6 months (around 6/8/2022). There are no Patient Instructions on file for this visit.   Follow up with Dr. Agustin Berry      No changes in meds    Discussed current covid status

## 2022-02-10 RX ORDER — ENALAPRIL MALEATE 10 MG/1
TABLET ORAL
Qty: 90 TABLET | Refills: 0 | Status: SHIPPED | OUTPATIENT
Start: 2022-02-10 | End: 2022-05-23 | Stop reason: SDUPTHER

## 2022-02-10 NOTE — TELEPHONE ENCOUNTER
Carla Tucker is calling to request a refill on the following medication(s):    Medication Request:  Requested Prescriptions     Pending Prescriptions Disp Refills    enalapril (VASOTEC) 10 MG tablet [Pharmacy Med Name: Enalapril Maleate Oral Tablet 10 MG] 90 tablet 0     Sig: TAKE 1 TABLET BY MOUTH EVERY DAY       Last Visit Date (If Applicable):  06/4/7699    Next Visit Date:    06/07/22

## 2022-03-14 ENCOUNTER — HOSPITAL ENCOUNTER (OUTPATIENT)
Dept: PHYSICAL THERAPY | Facility: CLINIC | Age: 72
Setting detail: THERAPIES SERIES
Discharge: HOME | End: 2022-03-14
Payer: MEDICARE

## 2022-03-14 PROCEDURE — 97161 PT EVAL LOW COMPLEX 20 MIN: CPT

## 2022-03-14 PROCEDURE — 97110 THERAPEUTIC EXERCISES: CPT

## 2022-03-14 NOTE — CONSULTS
[facility]  [address]  Phone: [phone]  Fax: [phone]        Physical Therapy Upper Extremity Evaluation    Date:  3/14/2022  Patient: Anne-Marie Ng  : [birth year]  MRN: [id number]  Physician: [facility]  Insurance: MEDICARE ([employer]  Medical Diagnosis: M75.102 - Unspecified rotator cuff tear or rupture of left shoulder, not specified as traumatic    Rehab Codes: M25.512, M25.612,   Onset Date: DOS (2/15/22)   Next 's appt. : 3/23/22    Subjective:   CC/HPI: Pt reports to PT s/p L arthroscopic RTC repair, subacromial decompressions, biceps tenotomy, and distal clavicle coplane on 2/15/22. Per pt, he reports that things have been going well since his surgery. Pt reports without his sling, however states that he had to drive and will typically wear it most of the time otherwise. Pt noting that his pain has also been pretty manageable with use of pain medication. Pt denies any numbness/tingling in his arm or hand. Pt states that he has been occasionally working on lifting his arm. Pt stating that he is able to sleep pretty well currently. Pt with previous R RTC repair around 2 years ago.       PMHx:     [] Unremarkable             [x] Refer to full medical chart  In EPIC     Tests: [] X-Ray:   [] MRI:   [] Other:      Comorbidities:   [] Obesity [] Dialysis  [x] N/A   [] Asthma/COPD [] Dementia [] Other:   [] Stroke [] Sleep apnea [] Other:   [] Vascular disease [] Rheumatic disease [] Other:     ADL/IADL [x] Previously independent with all [x] Currently independent with all Who currently assists the patient with task     [] Previously independent with all except: [] Currently independent with all except:     Bathing  [] Assist [] Assist     Dress/grooming [] Assist [] Assist     Transfer/mobility [] Assist [] Assist     Feeding [] Assist [] Assist     Toileting [] Assist [] Assist     Driving [] Assist [] Assist WNL   Extension WNL   Rotation L- WNL R- WNL   Sidebend L- WNL R- WNL   Retraction            TESTS (+/-) LEFT RIGHT Not Tested   Vertebral A   x   CRLF    x   Speeds   [x]   Neers   [x]   Deal   [x]   Empty Can   [x]   Drop Arm   [x]   Post Apprehension   [x]   Ant Apprehension    [x]   Clunk   [x]   Sulcus   [x]   Elbow varus/valgus   [x]      []      []      []       OBSERVATION No Deficit Deficit Not Tested Comments   Posture       Forward Head [] [] []    Rounded Shoulders [] [x] []    Kyphosis [] [] []    Lordosis [] [] []    Lateral Shift [] [] []    Scoliosis [] [] []    Iliac Crest [] [] []    PSIS [] [] []    ASIS [] [] []    Genu Valgus [] [] []    Genu Varus [] [] []    Genu Recurvatum [] [] []    Pronation [] [] []    Supination [] [] []    Leg Length Discrp [] [] []    Slumped Sitting [] [x] []    Palpation [x] [] [] Pt with no tenderness to palpation   Sensation [x] [] [] No deficits with testing   Edema [] [] [x]    Neurological [] [] [x]        Flexibility Normal LUE Deficit RUE Deficit   UTrap [] [x] [x]   L.Scap [] [x] [x]   Scalenes  [] [] []   Pec Major [] [x] [x]   Pec Minor [] [x] [x]   Lats [] [x] [x]   Supinators [] [] []   Pronators [] [] []   Other:                            FUNCTION Normal Difficult Unable   Sitting [x] [] []   Standing [x] [] []   Ambulation [x] [] []   Groom/Dress [x] [] []   Lift/Carry [] [] [x]   Stairs [x] [] []   Bending [x] [] []   OH reach [] [] [x]   Sit to Stand [x] [] []       Functional Test: UEFS Score: 57% functionally impaired                                          [facility] Fall Risk Assessment    Patient Name:  Salvatore Fischer  : 1950      Risk Factor Scale  Score   History of Falls [] Yes  [x] No 25  0 0   Secondary Diagnosis [] Yes  [x] No 15  0 0   Ambulatory Aid [] Furniture  [] Crutches/cane/walker  [x] None/bedrest/wheelchair/nurse 30  15  0 0   IV/Heparin Lock [] Yes  [x] No 20  0 0   Gait/Transferring [] Impaired  [] Weak  [x] Normal/bedrest/immobile 20  10  0 0   Mental Status [] Forgets limitations  [x] Oriented to own ability 15  0 0      Total: 0     Based on the Assessment score: check the appropriate box. [x]  No intervention needed   Low =   Score of 0-24    []  Use standard prevention interventions Moderate =  Score of 24-44   [] Give patient handout and discuss fall prevention strategies   [] Establish goal of education for patient/family RE: fall prevention strategies    []  Use high risk prevention interventions High = Score of 45 and higher   [] Give patient handout and discuss fall prevention strategies   [] Establish goal of education for patient/family Re: fall prevention strategies   [] Discuss lifeline / other resources    Electronically signed by: Garrett Kidd PT  Date: 3/14/2022          Assessment:    Patient would benefit from skilled physical therapy services in order to: Progress L UE strength, ROM, and functional use following RTC repair to improve functional use of shoulder    Problems:    [x] ? Pain:  [x] ? ROM:  [x] ? Strength:  [x] ? Function:  [] Other:         STG: (to be met in 10 treatments)  1. ? Pain: Pt to decrease pain levels to 4/10 with all functional use of L arm, helping to improve tolerance to use with activity. 2. ? ROM: Increase flexibility throughout L UE to help improve functional ROM of L UE grossly. 3. Independent with Home Exercise Programs    LTG: (to be met in 20 treatments)  1. Improve score of UEFS from 57% impaired to <30% impaired, demonstrating improve functional use of L arm.  2. Improve L UE strength to 4+/5, reducing need for compensations with use of UE with ADLs. 3. Pt will demonstrate L shoulder AROM equal to R in order to reduce need for compensations with functional use.   4. Decrease pain to 0/10    Patient goals: \"Play golf soon\"    Rehab Potential:  [x] Good  [] Fair  [] Poor   Suggested Professional Referral:  [x] No  [] Yes:  Barriers to Goal Achievement[de-identified]  [x] No  [] Yes:  Domestic Concerns:  [x] No  [] Yes:    Pt. Education:  [x] Plans/Goals, Risks/Benefits discussed  [x] Home exercise program  Method of Education: [x] Verbal  [x] Demo  [x] Written  Access Code: [email or web address]  URL: [email or web address]  Date: 03/14/2022  Prepared by: Brii Kauffman    Exercises  Supine Shoulder Flexion Extension AAROM with Dowel - 3 x daily - 7 x weekly - 10 reps - 10 seconds hold  Supine Shoulder External Rotation with Dowel - 3 x daily - 7 x weekly - 10 reps - 10 seconds hold  Supine Shoulder Abduction AAROM with Dowel - 3 x daily - 7 x weekly - 10 reps - 10 seconds hold  Shoulder Flexion Wall Slide with Towel - 3 x daily - 7 x weekly - 10 reps - 10 seconds hold  Scaption Wall Slide with Towel - 3 x daily - 7 x weekly - 10 reps - 10 seconds hold    Comprehension of Education:  [x] Verbalizes understanding. [x] Demonstrates understanding. [x] Needs Review. [] Demonstrates/verbalizes understanding of HEP/Ed previously given. Treatment Plan:  [x] Therapeutic Exercise    [] Modalities:  [x] Therapeutic Activity    [] Ultrasound  [] Electrical Stimulation  [] Gait Training     [] Lumbar/Cervical Traction  [x] Neuromuscular Re-education [x] Cold/hotpack [] Iontophoresis: 4 mg/mL  [x] Instruction in HEP              Dexamethasone Sodium Phosphate 40-80 mAmin  [x] Manual Therapy             []  Aquatic Therapy       [x] Vasocompression: Dorothye Binu  [] Other:    []  Medication allergies reviewed for use of    Dexamethasone Sodium Phosphate 4mg/ml     with iontophoresis treatments. Pt is not allergic.     Frequency:  2-3 x/week for 20 visits      Todays Treatment:  Precautions: General     Exercises: Protocol in hard chart   Exercise  L RTC repair    S/P Week 3 (Tue) Reps/ Time Weight/ Level Comments   Pulleys            UT S      Levator S                              Wand flexion 10x10\"     Wand abd 10x10\"     Wand ER 10x10\"           Wall slides 10x10\"  Flex, scapt         AROM at week 4                  Conservative strengthening no earlier than week 6      Other:      Specific Instructions for next treatment: Continue tx per POC    Evaluation Complexity:  History (Personal factors, comorbidities) [] 0 [x] 1-2 [] 3+   Exam (limitations, restrictions) [x] 1-2 [] 3 [] 4+   Clinical presentation (progression) [] Stable [x] Evolving  [] Unstable   Decision Making [x] Low [] Moderate [] High    [x] Low Complexity [] Moderate Complexity [] High Complexity       Treatment Charges: Mins Units   [x] Evaluation       [x]  Low       []  Moderate       []  High 27 1   []  Modalities     [x]  Ther Exercise 12 1   []  Manual Therapy     []  Ther Activities     []  Aquatics     []  Vasocompression     []  Other       TOTAL TREATMENT TIME: 39    Time in: 6184       Time out: 5675    Electronically signed by: Long Dominguez PT        Physician Signature:________________________________Date:__________________  By signing above or cosigning this note, I have reviewed this plan of care and certify a need for medically necessary rehabilitation services.      *PLEASE SIGN ABOVE AND FAX BACK ALL PAGES*

## 2022-03-17 ENCOUNTER — HOSPITAL ENCOUNTER (OUTPATIENT)
Dept: PHYSICAL THERAPY | Facility: CLINIC | Age: 72
Setting detail: THERAPIES SERIES
Discharge: HOME | End: 2022-03-17
Payer: MEDICARE

## 2022-03-17 PROCEDURE — 97110 THERAPEUTIC EXERCISES: CPT

## 2022-03-17 PROCEDURE — 97140 MANUAL THERAPY 1/> REGIONS: CPT

## 2022-03-17 NOTE — FLOWSHEET NOTE
[] [facility]) - [facility] &  Therapy  [address]  [phone]  F: [phone] [] [address]  [facility]   [address]  P: [phone]  F: [phone] [x] [address] &  Therapy  [address]  P: [phone]  F: [phone] [] [address]  P: [phone]  F: [phone] [] [address]  [facility]   [address]   [place]: [phone]  F: [phone]      Physical Therapy Daily Treatment Note    Date:  3/17/2022  Patient Name:  Clarence Obregon    :  [birth year]  MRN: [id number]  Physician: Orlando Willson: [address] ([employer]  Medical Diagnosis: M75.102 - Unspecified rotator cuff tear or rupture of left shoulder, not specified as traumatic                    Rehab Codes: M25.512, M25.612,   Onset Date: DOS (2/15/22)                           Next 's appt. : 3/23/22  Visit# / total visits: ; Progress note for Medicare patient due at visit 10     Cancels/No Shows: 0/0    Subjective:  Pt reports to PT with 3-4/10 pain, stating that his shoulder is feeling well upon arrival.  Pain:  [x] Yes  [] No  Location: L Shoulder   Pain Rating: (0-10 scale) 3-4/10  Pain altered Tx:  [x] No  [] Yes  Action:  Comments:    Objective:  Modalities:   Precautions: General                            Exercises: Protocol in hard chart   Exercise  L RTC repair     S/P Week 3 (Tue) Reps/ Time Weight/ Level Comments    Pulleys  4'   Flex, scap  x              UT S  3x30\"    Not using L arm x   Levator S  3x30\"   Not using L arm   x                                               Wand flexion 10x10\"        Wand abd 10x10\"     x   Wand ER 10x10\"     x              Wall slides 10x10\"   Flex, scapt x          Dowel AAROM 2x10 Standing, jose UEs x              AROM at week 4                                Conservative strengthening no earlier than week 6          Other: Hypervolt L UT, L rhomboids, L lower trap, L lat        Treatment Charges: Mins Units   []  Modalities     [x]  Ther Exercise 34 2   []  Manual Therapy 6 1   []  Ther Activities     []  Aquatics     []  Vasocompression     []  Other     Total Treatment time 40 3       Assessment: [x] Progressing toward goals. Completed tx per log above with no issues. Began with warm up on pulleys to continue working on pt's ROM with good tolerance. Added dowel flexion AAROM with pt denying any increased pain or discomfort with completion. Ended session with manual via [employer] per log with pt reporting decreased tension. Pt denies any increase in pain following treatment. [] No change. [] Other:  [x] Patient would continue to benefit from skilled physical therapy services in order to: Progress L UE strength, ROM, and functional use following RTC repair to improve functional use of shoulder    STG: (to be met in 10 treatments)  1. ? Pain: Pt to decrease pain levels to 4/10 with all functional use of L arm, helping to improve tolerance to use with activity. 2. ? ROM: Increase flexibility throughout L UE to help improve functional ROM of L UE grossly. 3. Independent with Home Exercise Programs     LTG: (to be met in 20 treatments)  1. Improve score of UEFS from 57% impaired to <30% impaired, demonstrating improve functional use of L arm.  2. Improve L UE strength to 4+/5, reducing need for compensations with use of UE with ADLs. 3. Pt will demonstrate L shoulder AROM equal to R in order to reduce need for compensations with functional use. 4. Decrease pain to 0/10     Patient goals: \"Play golf soon\"    Pt. Education:  [x] Yes  [] No  [x] Reviewed Prior HEP/Ed  Method of Education: [x] Verbal  [x] Demo  [] Written  Comprehension of Education:  [x] Verbalizes understanding.   [x] Demonstrates understanding. [x] Needs review. [] Demonstrates/verbalizes HEP/Ed previously given. Plan: [x] Continue current frequency toward long and short term goals. [x] Specific Instructions for subsequent treatments: Continue tx per POC      Time In: 0900            Time Out: 0585    Electronically signed by:   Randy Odom PT

## 2022-03-17 NOTE — PRE-CERTIFICATION NOTE
Medicare Cap   [x] Physical Therapy  [] Speech Therapy  [] Occupational therapy  *PT and Speech caps combine      $2150 Limit for PT and Speech combined  $2150 Limit for OT individually  At the beginning of the month where you expect to go over $2150, please add the [address] [place] [address] modifier      Patient Name: Liliana Ree: [birth year]    Note:  This is an estimate of charges billed.      Date of [address] Name # units/ charge $$$ charge Daily Total Charge Ongoing Total $$$   3/14/22 Fadumo Granados 1/1 67.00+31.20 120.85 120.85   3/17/22 Therex, manual 2/1 29.21+22.84+21.34 73.39 194.24

## 2022-03-21 ENCOUNTER — HOSPITAL ENCOUNTER (OUTPATIENT)
Dept: PHYSICAL THERAPY | Facility: CLINIC | Age: 72
Setting detail: THERAPIES SERIES
Discharge: HOME | End: 2022-03-21
Payer: MEDICARE

## 2022-03-21 PROCEDURE — 97110 THERAPEUTIC EXERCISES: CPT

## 2022-03-21 NOTE — FLOWSHEET NOTE
[] [facility]) - [facility] &  Therapy  [address]  [phone]  F: [phone] [] [address]  [facility]   [address]  P: [phone]  F: [phone] [x] [address] &  Therapy  [address]  P: [phone]  F: [phone] [] [address]  P: [phone]  F: [phone] [] [address]  [facility]   [address]   [place]: [phone]  F: [phone]      Physical Therapy Daily Treatment Note    Date:  3/21/2022  Patient Name:  Roman Mao    :    MRN: [id number]  Physician: Orlando Willson: [address] ([facility]  Medical Diagnosis: M75.102 - Unspecified rotator cuff tear or rupture of left shoulder, not specified as traumatic                    Rehab Codes: M25.512, M25.612,   Onset Date: DOS (2/15/22)                           Next 's appt. : 3/23/22  Visit# / total visits: 3/20; Progress note for Medicare patient due at visit 10     Cancels/No Shows: 0/0    Subjective: No significant pain of the L shoulder to mention of this morning. States no increase pain following his previous visit as well. Overall doing well. Pain:  [x] Yes  [] No  Location: L Shoulder   Pain Rating: (0-10 scale) 3/10  Pain altered Tx:  [x] No  [] Yes  Action:  Comments:    Objective:       Today's Treatment:  Modalities:   Precautions: General                            Exercises: Protocol in hard chart   Exercise  L RTC repair     S/P Week 4 (Tue) Reps/ Time Weight/ Level Comments    Pulleys  4'   Flex, scap  x              UT S  3x30\"   Not using L arm x   Levator S  3x30\"   Not using L arm   x   Seated scap retract  20x     x          Supine ABCs                  Wand abd 10x10\"     x   Wand ER 10x10\"     x SL ER 2x10  towel x   SL flexion 2x10   x   SL abd 10x   x              Wall slides 10x10\"   Flex, scapt x          Dowel AAROM 2x10  Standing, jose UEs, flexion x              AROM at week 4                                Conservative strengthening no earlier than week 6          Other: Hypervolt L UT, L rhomboids, L lower trap, L lat - held         Treatment Charges: Mins Units   []  Modalities     [x]  Ther Exercise 40 3   []  Manual Therapy     []  Ther Activities     []  Aquatics     []  Vasocompression     []  Other     Total Treatment time 40 3       Assessment: [x] Progressing toward goals. Continued with pulleys to begin treatment to progress shoulder ROM. Reviewed and completed cervical stretches. Cueing with standing wand flexion to avoid pushing into flexion. Began AROM this date with the addition of sidelying therex again with cueing to not push into ROM that is painful, good follow through. Just mild shoulder soreness post ex but no pain. Held hypervolt this date d/t no c/o soreness at this time. [] No change. [] Other:  [x] Patient would continue to benefit from skilled physical therapy services in order to: Progress L UE strength, ROM, and functional use following RTC repair to improve functional use of shoulder    STG: (to be met in 10 treatments)  1. ? Pain: Pt to decrease pain levels to 4/10 with all functional use of L arm, helping to improve tolerance to use with activity. 2. ? ROM: Increase flexibility throughout L UE to help improve functional ROM of L UE grossly. 3. Independent with Home Exercise Programs     LTG: (to be met in 20 treatments)  1. Improve score of UEFS from 57% impaired to <30% impaired, demonstrating improve functional use of L arm.  2. Improve L UE strength to 4+/5, reducing need for compensations with use of UE with ADLs. 3. Pt will demonstrate L shoulder AROM equal to R in order to reduce need for compensations with functional use.   4. Decrease pain to 0/10     Patient goals: \"Play golf soon\"    Pt. Education:  [x] Yes  [] No  [x] Reviewed Prior HEP/Ed  Method of Education: [x] Verbal  [x] Demo  [] Written  Comprehension of Education:  [x] Verbalizes understanding. [x] Demonstrates understanding. [x] Needs review. [] Demonstrates/verbalizes HEP/Ed previously given. Plan: [x] Continue current frequency toward long and short term goals.     [x] Specific Instructions for subsequent treatments: Continue tx per POC      Time In: 0900            Time Out: 7421    Electronically signed by:  Roberto Ahumada PTA

## 2022-03-22 SDOH — HEALTH STABILITY: PHYSICAL HEALTH: ON AVERAGE, HOW MANY DAYS PER WEEK DO YOU ENGAGE IN MODERATE TO STRENUOUS EXERCISE (LIKE A BRISK WALK)?: 0 DAYS

## 2022-03-22 SDOH — HEALTH STABILITY: PHYSICAL HEALTH: ON AVERAGE, HOW MANY MINUTES DO YOU ENGAGE IN EXERCISE AT THIS LEVEL?: 0 MIN

## 2022-03-22 ASSESSMENT — SOCIAL DETERMINANTS OF HEALTH (SDOH)

## 2022-03-24 ENCOUNTER — HOSPITAL ENCOUNTER (OUTPATIENT)
Dept: PHYSICAL THERAPY | Facility: CLINIC | Age: 72
Setting detail: THERAPIES SERIES
Discharge: HOME | End: 2022-03-24
Payer: MEDICARE

## 2022-03-24 PROCEDURE — 97110 THERAPEUTIC EXERCISES: CPT

## 2022-03-24 NOTE — PRE-CERTIFICATION NOTE
Medicare Cap   [x] Physical Therapy  [] Speech Therapy  [] Occupational therapy  *PT and Speech caps combine      $2150 Limit for PT and Speech combined  $2150 Limit for OT individually  At the beginning of the month where you expect to go over $2150, please add the [address] [place] modifier      Patient Name: Yomaira Foote: [birth year]    Note:  This is an estimate of charges billed.      Date of [address] Name # units/ charge $$$ charge Daily Total Charge Ongoing Total $$$   3/14/22 Yeni Michelle 1/1 44.50+80.98 120.85 120.85   3/17/22 Therex, manual 2/1 29.21+22.84+21.34 73.39 194.24   3/21/22 Therex 3 29.21+22.84+22.84 74.89 269.13   3/24/22 Therex 3 29.21+22.84+22.84 74.89 344.02

## 2022-03-24 NOTE — FLOWSHEET NOTE
[] [facility]) - [facility] &  Therapy  [address]  [phone]  F: [phone] [] [address]  [facility] 36   [address]  P: [phone]  F: [phone] [x] [address] &  Therapy  [address] [facility]  P: [phone]  F: [phone] [] [address]  P: [phone]  F: [phone] [] [address]  [facility]   [address]   [place]: [phone]  F: [phone]      Physical Therapy Daily Treatment Note    Date:  3/24/2022  Patient Name:  Michael Renteria    :  [birth year]  MRN: [id number]  Physician: Orlando Willson: [facility] [facility] ([facility]  Medical Diagnosis: M75.102 - Unspecified rotator cuff tear or rupture of left shoulder, not specified as traumatic                    Rehab Codes: M25.512, M25.612,   Onset Date: DOS (2/15/22)                           Next 's appt. : 3/23/22  Visit# / total visits: ; Progress note for Medicare patient due at visit 10     Cancels/No Shows: 0/0    Subjective: Pt presenting to PT with some increased soreness today although unable to attribute to anything specific. Pain:  [x] Yes  [] No  Location: L Shoulder   Pain Rating: (0-10 scale) 4/10  Pain altered Tx:  [x] No  [] Yes  Action:  Comments:    Objective:       Today's Treatment:  Modalities:   Precautions: General                            Exercises: Protocol in hard chart   Exercise  L RTC repair     S/P Week 4 (Tue) Reps/ Time Weight/ Level Comments    Pulleys  4'   Flex, scap  x              UT S  3x30\"   Not using L arm x   Levator S  3x30\"   Not using L arm   x   Seated scap retract 2x10, 5\"     x          Supine ABCs                  Wand abd 10x10\"     x   Wand ER 10x10\"     x   SL ER 2x10  towel x   SL flexion 2x10   x   SL abd 2x10   x              Wall slides 10x10\"   Flex, scapt x          Dowel AAROM 2x10  Standing, jose UEs, flexion               Flexion AROM 2x10      x                         Conservative strengthening no earlier than week 6          Other: Hypervolt L UT, L rhomboids, L lower trap, L lat - held         Treatment Charges: Mins Units   []  Modalities     [x]  Ther Exercise 40 3   []  Manual Therapy     []  Ther Activities     []  Aquatics     []  Vasocompression     []  Other     Total Treatment time 40 3       Assessment: [x] Progressing toward goals. Completed tx per log with no issues. Continued with all stretches and wand AAROM exercises to keep progressing functional ROM with fair recall, requiring cueing with half of the exercises to recall for proper completion. Able to add AROM of L shoulder today to start progressing ROM with good tolerance and no display of UT compensation to shoulder height. No issues or increased pain reported following today's session. [] No change. [] Other:  [x] Patient would continue to benefit from skilled physical therapy services in order to: Progress L UE strength, ROM, and functional use following RTC repair to improve functional use of shoulder    STG: (to be met in 10 treatments)  1. ? Pain: Pt to decrease pain levels to 4/10 with all functional use of L arm, helping to improve tolerance to use with activity. 2. ? ROM: Increase flexibility throughout L UE to help improve functional ROM of L UE grossly. 3. Independent with Home Exercise Programs     LTG: (to be met in 20 treatments)  1. Improve score of UEFS from 57% impaired to <30% impaired, demonstrating improve functional use of L arm.  2. Improve L UE strength to 4+/5, reducing need for compensations with use of UE with ADLs. 3. Pt will demonstrate L shoulder AROM equal to R in order to reduce need for compensations with functional use.   4. Decrease pain to 0/10     Patient goals: \"Play golf soon\"    Pt. Education:  [x] Yes  [] No  [x] Reviewed Prior HEP/Ed  Method of Education: [x] Verbal  [x] Demo  [] Written  Comprehension of Education:  [x] Verbalizes understanding. [x] Demonstrates understanding. [x] Needs review. [] Demonstrates/verbalizes HEP/Ed previously given. Plan: [x] Continue current frequency toward long and short term goals. [x] Specific Instructions for subsequent treatments: Continue tx per POC      Time In: 0900            Time Out: 1757    Electronically signed by:   Moiz Sánchez PT

## 2022-03-25 ENCOUNTER — OFFICE VISIT (OUTPATIENT)
Dept: PRIMARY CARE CLINIC | Age: 72
End: 2022-03-25
Payer: MEDICARE

## 2022-03-25 VITALS
WEIGHT: 221 LBS | SYSTOLIC BLOOD PRESSURE: 132 MMHG | BODY MASS INDEX: 34.69 KG/M2 | DIASTOLIC BLOOD PRESSURE: 72 MMHG | OXYGEN SATURATION: 96 % | HEIGHT: 67 IN | HEART RATE: 74 BPM

## 2022-03-25 DIAGNOSIS — I10 ESSENTIAL HYPERTENSION: Primary | ICD-10-CM

## 2022-03-25 DIAGNOSIS — M19.90 INFLAMMATORY ARTHROPATHY: ICD-10-CM

## 2022-03-25 DIAGNOSIS — E78.00 PURE HYPERCHOLESTEROLEMIA: ICD-10-CM

## 2022-03-25 DIAGNOSIS — G89.29 CHRONIC PAIN OF MULTIPLE JOINTS: ICD-10-CM

## 2022-03-25 DIAGNOSIS — R73.03 PREDIABETES: ICD-10-CM

## 2022-03-25 DIAGNOSIS — M25.50 CHRONIC PAIN OF MULTIPLE JOINTS: ICD-10-CM

## 2022-03-25 DIAGNOSIS — Z13.0 SCREENING, ANEMIA, DEFICIENCY, IRON: ICD-10-CM

## 2022-03-25 DIAGNOSIS — M06.9 RHEUMATOID ARTHRITIS INVOLVING MULTIPLE JOINTS (HCC): ICD-10-CM

## 2022-03-25 PROCEDURE — G8427 DOCREV CUR MEDS BY ELIG CLIN: HCPCS | Performed by: NURSE PRACTITIONER

## 2022-03-25 PROCEDURE — 1036F TOBACCO NON-USER: CPT | Performed by: NURSE PRACTITIONER

## 2022-03-25 PROCEDURE — G8417 CALC BMI ABV UP PARAM F/U: HCPCS | Performed by: NURSE PRACTITIONER

## 2022-03-25 PROCEDURE — 99204 OFFICE O/P NEW MOD 45 MIN: CPT | Performed by: NURSE PRACTITIONER

## 2022-03-25 PROCEDURE — 3017F COLORECTAL CA SCREEN DOC REV: CPT | Performed by: NURSE PRACTITIONER

## 2022-03-25 PROCEDURE — G8484 FLU IMMUNIZE NO ADMIN: HCPCS | Performed by: NURSE PRACTITIONER

## 2022-03-25 PROCEDURE — 1123F ACP DISCUSS/DSCN MKR DOCD: CPT | Performed by: NURSE PRACTITIONER

## 2022-03-25 PROCEDURE — 4040F PNEUMOC VAC/ADMIN/RCVD: CPT | Performed by: NURSE PRACTITIONER

## 2022-03-25 ASSESSMENT — ENCOUNTER SYMPTOMS
RHINORRHEA: 0
NAUSEA: 0
CHEST TIGHTNESS: 0
VOMITING: 0
SORE THROAT: 0
SHORTNESS OF BREATH: 0
DIARRHEA: 0
COLOR CHANGE: 0
ABDOMINAL PAIN: 0

## 2022-03-25 ASSESSMENT — PATIENT HEALTH QUESTIONNAIRE - PHQ9
SUM OF ALL RESPONSES TO PHQ QUESTIONS 1-9: 0
SUM OF ALL RESPONSES TO PHQ QUESTIONS 1-9: 0
1. LITTLE INTEREST OR PLEASURE IN DOING THINGS: 0
SUM OF ALL RESPONSES TO PHQ QUESTIONS 1-9: 0
SUM OF ALL RESPONSES TO PHQ QUESTIONS 1-9: 0
2. FEELING DOWN, DEPRESSED OR HOPELESS: 0
SUM OF ALL RESPONSES TO PHQ9 QUESTIONS 1 & 2: 0

## 2022-03-25 NOTE — PROGRESS NOTES
[address] PRIMARY CARE  . [address]  [address] [address]  Dept: [phone]  Dept Fax: [phone]    Christinia Siemens is a 66 y.o. male who presentstoday for his medical conditions/complaints as noted below. Christinia Siemens is c/o of  Chief Complaint   Patient presents with    Establish Care         HPI:     Here today to establish care, previously followed with Dr Chiquita Dobbins  [facility] significant for rheumatoid arthritis, follows w/ rheum for this but was told him rheumatologist was retiring. RA well controlled on methotrexate 2.5 mg, meloxicam 15 mg daily and PRN percocet. Rheum previously managed pt's percocet, has pain management referral for continued management since rheum is retiring but would like new referral in . [address]. Would like referral to establish with new rheumatologist also  Recently had left bicep tendon and rotator cuff tear repair Feb/22, hx of the same on right shoulder in past due to degenerative changes. Hx of CPPD which also contributes to chronic pain  BP well controlled on current regimen of Enalapril 10 mg, Metoprolol 25 mg and Amlodipine 10 mg daily. Denies CP, SOB, leg swelling or HA    No acute complaints or concerns today.        Hemoglobin A1C (%)   Date Value   08/24/2020 6.3             ( goal A1C is < 7)   No results found for: LABMICR  LDL Calculated (mg/dL)   Date Value   09/16/2021 70   08/24/2020 54   10/16/2019 115       (goal LDL is <100)   AST (U/L)   Date Value   09/16/2021 20     ALT (U/L)   Date Value   09/16/2021 17     BUN (mg/dL)   Date Value   09/16/2021 18     BP Readings from Last 3 Encounters:   03/25/22 132/72   12/08/21 138/76   10/20/21 126/86          ([id number])    Past Medical History:   Diagnosis Date    Calcium pyrophosphate deposition disease     Diverticulosis of colon     Hypertension     Osteoarthritis       Past Surgical History:   Procedure Laterality Date    COLONOSCOPY 2008    diverticulosis    COLONOSCOPY  2017    Diverticulosis    HERNIA REPAIR      PILONIDAL CYST EXCISION      IA COLON CA SCRN NOT HI RSK IND N/A 2017    COLONOSCOPY performed by Rick Gonzalez MD at [address] [place] [address] Right 2020    TOTAL KNEE ARTHROPLASTY Left 2016    [place]/Dr. Mac       No family history on file. Social History     Tobacco Use    Smoking status: Former Smoker     Packs/day: 1.50     Years: 25.00     Pack years: 37.50     Quit date: 1980     Years since quittin.2    Smokeless tobacco: Never Used    Tobacco comment: estimated quit date   Substance Use Topics    Alcohol use: No      Current Outpatient Medications   Medication Sig Dispense Refill    ELDERBERRY PO Take 2 tablets by mouth daily      Multiple Vitamins-Minerals (PRESERVISION/LUTEIN PO) Take 2 capsules by mouth daily      Multiple Vitamins-Minerals (THERAGRAN-M PO) Take 1 tablet by mouth daily      enalapril (VASOTEC) 10 MG tablet TAKE 1 TABLET BY MOUTH EVERY DAY 90 tablet 0    metoprolol tartrate (LOPRESSOR) 25 MG tablet TAKE 1 TABLET BY MOUTH TWO TIMES A  tablet 0    amLODIPine (NORVASC) 10 MG tablet TAKE 1 TABLET BY MOUTH EVERY DAY 90 tablet 0    atorvastatin (LIPITOR) 20 MG tablet TAKE 1 TABLET BY MOUTH EVERY DAY 30 tablet 3    folic acid (FOLVITE) 1 MG tablet daily      methotrexate (RHEUMATREX) 2.5 MG chemo tablet Take 2.5 mg by mouth 10 tablets weekly      meloxicam (MOBIC) 15 MG tablet Take by mouth daily  1    oxyCODONE-acetaminophen (PERCOCET) 5-325 MG per tablet Take by mouth every 4 hours as needed  0    Multiple Vitamins-Minerals (MULTIVITAMIN PO) Take  by mouth.  CALCIUM PO Take  by mouth. No current facility-administered medications for this visit.      No Known Allergies    Health Maintenance   Topic Date Due    Annual Wellness Visit (AWV)  Never done    A1C test (Diabetic or Prediabetic)  2021    Shingles Vaccine (1 of 2) 03/25/2023 (Originally 5/18/1998)    Lipid screen  09/16/2022    Potassium monitoring  09/16/2022    Creatinine monitoring  09/16/2022    Depression Screen  03/25/2023    Colorectal Cancer Screen  12/29/2027    DTaP/Tdap/Td vaccine (2 - Td or Tdap) 04/11/2029    Flu vaccine  Completed    Pneumococcal 65+ years Vaccine  Completed    COVID-19 Vaccine  Completed    AAA screen  Completed    Hepatitis C screen  Completed    Hepatitis A vaccine  Aged Out    Hepatitis B vaccine  Aged Out    Hib vaccine  Aged Out    Meningococcal (ACWY) vaccine  Aged Out       Subjective:      Review of Systems   Constitutional: Negative for activity change, fatigue and fever. HENT: Negative for congestion, rhinorrhea and sore throat. Eyes: Negative for visual disturbance. Respiratory: Negative for chest tightness and shortness of breath. Cardiovascular: Negative for chest pain and palpitations. Gastrointestinal: Negative for abdominal pain, diarrhea, nausea and vomiting. Endocrine: Negative for polydipsia. Genitourinary: Negative for difficulty urinating. Musculoskeletal: Positive for arthralgias (hx of RA). Negative for myalgias. Skin: Negative for color change. Neurological: Negative for weakness and headaches. Psychiatric/Behavioral: Negative for behavioral problems. The patient is not nervous/anxious. All other systems reviewed and are negative. Objective:   /72   Pulse 74   Ht 5' 7\" (1.702 m)   Wt 221 lb (100.2 kg)   SpO2 96%   BMI 34.61 kg/m²   Physical Exam  Vitals reviewed. Constitutional:       General: He is not in acute distress. Appearance: Normal appearance. HENT:      Head: Normocephalic. Eyes:      Pupils: Pupils are equal, round, and reactive to light. Cardiovascular:      Rate and Rhythm: Normal rate and regular rhythm. Pulses: Normal pulses. Heart sounds: Normal heart sounds.    Pulmonary:      Effort: Pulmonary effort is normal.      Breath sounds: Normal breath sounds. Abdominal:      General: Bowel sounds are normal. There is no distension. Palpations: Abdomen is soft. Musculoskeletal:         General: Normal range of motion. Cervical back: Neck supple. Right lower leg: No edema. Left lower leg: No edema. Lymphadenopathy:      Cervical: No cervical adenopathy. Skin:     General: Skin is warm and dry. Capillary Refill: Capillary refill takes less than 2 seconds. Neurological:      General: No focal deficit present. Mental Status: He is alert and oriented to person, place, and time. Psychiatric:         Mood and Affect: Mood normal.         Behavior: Behavior normal.           :       Diagnosis Orders   1. Essential hypertension  Comprehensive Metabolic Panel   2. Prediabetes  Hemoglobin A1C   3. Pure hypercholesterolemia  Lipid Panel   4. Screening, anemia, deficiency, iron  CBC with Auto Differential   5. Inflammatory arthropathy  Christopher Boo MD, Pain Management, Max Fajardo MD, Rheumatology, [facility]   6. Rheumatoid arthritis involving multiple joints [facility]  Christopher Boo MD, Pain Management, Max Fajardo MD, Rheumatology, [facility]   7. Chronic pain of multiple joints  Christopher Boo MD, Pain Management, Max Fajardo MD, Rheumatology, [facility]             :          1. Essential hypertension  Stable, continue metoprolol, norvasc and enalapril. Low salt diet and exercise as able  - Comprehensive Metabolic Panel; Future    2. Prediabetes  Check labs, low carb diet and exercise    - Hemoglobin A1C; Future    3. Pure hypercholesterolemia  Stable, on statin  - Lipid Panel; Future    4. Screening, anemia, deficiency, iron  - CBC with Auto Differential; Future    5. Inflammatory arthropathy  6. Rheumatoid arthritis involving multiple joints (HCC)  7.  Chronic pain of multiple joints    Chronic and well controlled. New referrals for pain management and rheum per pt request. Continue NSAID, methotrexate and percocet PRN. Has been on opiate pain medication for many years, no aberrant behavior on PDMP or adverse side effects.    - Nimesh Peterson MD, Pain Management, Joy Lee MD, Rheumatology, [place]    Return in about 4 months (around 7/25/2022) for Hypertension, AWV. Patient given educational materials - see patient instructions. Discussed use, benefit, and side effects of prescribed medications. All patient questions answered. Pt voiced understanding. Reviewed health maintenance. Instructed to continue current medications, diet and exercise. Patient agreed with treatment plan. Follow up as directed.        Electronicallysigned by TOMAS Llamas CNP on 3/26/2022 at 6:57 AM

## 2022-03-28 ENCOUNTER — HOSPITAL ENCOUNTER (OUTPATIENT)
Age: 72
Setting detail: SPECIMEN
Discharge: HOME | End: 2022-03-28

## 2022-03-28 ENCOUNTER — HOSPITAL ENCOUNTER (OUTPATIENT)
Dept: PHYSICAL THERAPY | Facility: CLINIC | Age: 72
Setting detail: THERAPIES SERIES
Discharge: HOME | End: 2022-03-28
Payer: MEDICARE

## 2022-03-28 DIAGNOSIS — R73.03 PREDIABETES: ICD-10-CM

## 2022-03-28 DIAGNOSIS — I10 ESSENTIAL HYPERTENSION: ICD-10-CM

## 2022-03-28 DIAGNOSIS — E78.00 PURE HYPERCHOLESTEROLEMIA: ICD-10-CM

## 2022-03-28 DIAGNOSIS — Z13.0 SCREENING, ANEMIA, DEFICIENCY, IRON: ICD-10-CM

## 2022-03-28 LAB
ABSOLUTE EOS #: 0.55 K/UL (ref 0–0.44)
ABSOLUTE IMMATURE GRANULOCYTE: 0.03 K/UL (ref 0–0.3)
ABSOLUTE LYMPH #: 2.18 K/UL (ref 1.1–3.7)
ABSOLUTE MONO #: 0.6 K/UL (ref 0.1–1.2)
ALBUMIN SERPL-MCNC: 4.4 G/DL (ref 3.5–5.2)
ALBUMIN/GLOBULIN RATIO: 2 (ref 1–2.5)
ALP BLD-CCNC: 75 U/L (ref 40–129)
ALT SERPL-CCNC: 18 U/L (ref 5–41)
ANION GAP SERPL CALCULATED.3IONS-SCNC: 13 MMOL/L (ref 9–17)
AST SERPL-CCNC: 18 U/L
BASOPHILS # BLD: 1 % (ref 0–2)
BASOPHILS ABSOLUTE: 0.08 K/UL (ref 0–0.2)
BILIRUB SERPL-MCNC: 0.38 MG/DL (ref 0.3–1.2)
BUN BLDV-MCNC: 15 MG/DL (ref 8–23)
CALCIUM SERPL-MCNC: 9.7 MG/DL (ref 8.6–10.4)
CHLORIDE BLD-SCNC: 107 MMOL/L (ref 98–107)
CHOLESTEROL/HDL RATIO: 2.7
CHOLESTEROL: 127 MG/DL
CO2: 23 MMOL/L (ref 20–31)
CREAT SERPL-MCNC: 0.94 MG/DL (ref 0.7–1.2)
EOSINOPHILS RELATIVE PERCENT: 7 % (ref 1–4)
ESTIMATED AVERAGE GLUCOSE: 128 MG/DL
GFR AFRICAN AMERICAN: >60 ML/MIN
GFR NON-AFRICAN AMERICAN: >60 ML/MIN
GFR SERPL CREATININE-BSD FRML MDRD: ABNORMAL ML/MIN/{1.73_M2}
GLUCOSE BLD-MCNC: 124 MG/DL (ref 70–99)
HBA1C MFR BLD: 6.1 % (ref 4–6)
HCT VFR BLD CALC: 39.7 % (ref 40.7–50.3)
HDLC SERPL-MCNC: 47 MG/DL
HEMOGLOBIN: 13.3 G/DL (ref 13–17)
IMMATURE GRANULOCYTES: 0 %
LDL CHOLESTEROL: 55 MG/DL (ref 0–130)
LYMPHOCYTES # BLD: 29 % (ref 24–43)
MCH RBC QN AUTO: 30 PG (ref 25.2–33.5)
MCHC RBC AUTO-ENTMCNC: 33.5 G/DL (ref 28.4–34.8)
MCV RBC AUTO: 89.6 FL (ref 82.6–102.9)
MONOCYTES # BLD: 8 % (ref 3–12)
NRBC AUTOMATED: 0 PER 100 WBC
PDW BLD-RTO: 13.7 % (ref 11.8–14.4)
PLATELET # BLD: 213 K/UL (ref 138–453)
PMV BLD AUTO: 10.1 FL (ref 8.1–13.5)
POTASSIUM SERPL-SCNC: 5 MMOL/L (ref 3.7–5.3)
RBC # BLD: 4.43 M/UL (ref 4.21–5.77)
SEG NEUTROPHILS: 55 % (ref 36–65)
SEGMENTED NEUTROPHILS ABSOLUTE COUNT: 3.98 K/UL (ref 1.5–8.1)
SODIUM BLD-SCNC: 143 MMOL/L (ref 135–144)
TOTAL PROTEIN: 6.6 G/DL (ref 6.4–8.3)
TRIGL SERPL-MCNC: 125 MG/DL
WBC # BLD: 7.4 K/UL (ref 3.5–11.3)

## 2022-03-28 PROCEDURE — 97110 THERAPEUTIC EXERCISES: CPT

## 2022-03-28 RX ORDER — ATORVASTATIN CALCIUM 20 MG/1
TABLET, FILM COATED ORAL
Qty: 90 TABLET | Refills: 1 | Status: SHIPPED | OUTPATIENT
Start: 2022-03-28 | End: 2022-08-01

## 2022-03-28 NOTE — PRE-CERTIFICATION NOTE
Medicare Cap   [x] Physical Therapy  [] Speech Therapy  [] Occupational therapy  *PT and Speech caps combine      $2150 Limit for PT and Speech combined  $2150 Limit for OT individually  At the beginning of the month where you expect to go over $2150, please add the [address] [place] modifier      Patient Name: Lizbeth March: 1950    Note:  This is an estimate of charges billed.      Date of [address] Name # units/ charge $$$ charge Daily Total Charge Ongoing Total $$$   3/14/22 Chantell Oropeza 1/1 67.24+23.12 120.85 120.85   3/17/22 Therex, manual 2/1 29.21+22.84+21.34 73.39 194.24   3/21/22 Therex 3 29.21+22.84+22.84 74.89 269.13   3/24/22 Therex 3 29.21+22.84+22.84 74.89 344.02   3//28/22 TE 3  74.89 418.91

## 2022-03-28 NOTE — FLOWSHEET NOTE
[] [facility]) - [facility] &  Therapy  [address]  [phone]  F: [phone] [] [address]  [facility]   [address]  P: [phone]  F: [phone] [x] [address] &  Therapy  [address]  P: [phone]  F: [phone] [] [address]  P: [phone]  F: [phone] [] [address]  [facility]   [address]   [place]: [phone]  F: [phone]      Physical Therapy Daily Treatment Note    Date:  3/28/2022  Patient Name:  Larry Montes. \"Shashank\"   :  1950  MRN: [id number]  Physician: [address] [place] [address]              Insurance: MEDICARE (Med. Nec[employer]  Medical Diagnosis: M75.102 - Unspecified rotator cuff tear or rupture of left shoulder, not specified as traumatic                    Rehab Codes: M25.512, M25.612,   Onset Date: DOS (2/15/22)                           Next 's appt. : 3/23/22  Visit# / total visits: ; Progress note for Medicare patient due at visit 10     Cancels/No Shows: 0/0    Subjective: Symptoms tolerable on arrival.  Shoulder gets very painful with external rotation movements. Self applied kineso tape helps manage pain. Pain:  [x] Yes  [] No  Location: L Shoulder   Pain Rating: (0-10 scale) 3/10  Pain altered Tx:  [x] No  [] Yes  Action:  Comments: Tomorrow 3/29/22 will be S/P 6 weeeks     Objective:       Today's Treatment:  Modalities:   Precautions: General                            Exercises: Protocol in hard chart   Exercise  L RTC repair     S/P Week 5 (Tue) Reps/ Time Weight/ Level Comments    Pulleys  4'   Flex, scap  x              UT S  3x30\"   Not using L arm x   Levator S  3x30\"   Not using L arm   x   Seated scap retract 2x10, 5\"     x          Supine ABCs 1x   x           Wand abd 10x10\"     x   Wand ER 10x10\"     x   SL ER 2x10  towel x   SL flexion 2x10   x   SL abd 2x10   x              Wall slides 10x10\"   Flex, scapt x          Dowel AAROM 2x10  Standing, jose UEs, flexion               Flexion AROM 2x10      x   Scaption AROM  2x10      x              Conservative strengthening no earlier than week 6          Other: Hypervolt L UT, L rhomboids, L lower trap, L lat - held         Treatment Charges: Mins Units   []  Modalities     [x]  Ther Exercise 40 3   []  Manual Therapy     []  Ther Activities     []  Aquatics     []  Vasocompression     []  Other     Total Treatment time 40 3       Assessment: [x] Progressing toward goals. Tactile cuing to prevent shoulder shrug upon elevation during pulleys. Demonstrating a progression of ROM activities with pain under control. Added supine ABCs and standing scaption AROM. A little pain on fatigue with standing scaption but tolerable. Education required throughout session to prevent upper trap compensation, patient verbalizes understanding and able to correct with tactile input. Progressing as expected within protocol, plan to advance as tolerated. [] No change. [] Other:  [x] Patient would continue to benefit from skilled physical therapy services in order to: Progress L UE strength, ROM, and functional use following RTC repair to improve functional use of shoulder    STG: (to be met in 10 treatments)  1. ? Pain: Pt to decrease pain levels to 4/10 with all functional use of L arm, helping to improve tolerance to use with activity. 2. ? ROM: Increase flexibility throughout L UE to help improve functional ROM of L UE grossly. 3. Independent with Home Exercise Programs     LTG: (to be met in 20 treatments)  1. Improve score of UEFS from 57% impaired to <30% impaired, demonstrating improve functional use of L arm.  2. Improve L UE strength to 4+/5, reducing need for compensations with use of UE with ADLs.   3. Pt will demonstrate L shoulder AROM equal to R in order to reduce need for compensations with functional use. 4. Decrease pain to 0/10     Patient goals: \"Play golf soon\"    Pt. Education:  [x] Yes  [] No  [x] Reviewed Prior HEP/Ed  Method of Education: [x] Verbal  [x] Demo  [] Written  Comprehension of Education:  [x] Verbalizes understanding. [x] Demonstrates understanding. [] Needs review. [] Demonstrates/verbalizes HEP/Ed previously given. Plan: [x] Continue current frequency toward long and short term goals.     [x] Specific Instructions for subsequent treatments: Continue tx per POC      Time In: 09:30 AM            Time Out: 10:10 AM    Electronically signed by:  Armond Albright PTA negative...

## 2022-03-31 ENCOUNTER — HOSPITAL ENCOUNTER (OUTPATIENT)
Dept: PHYSICAL THERAPY | Facility: CLINIC | Age: 72
Setting detail: THERAPIES SERIES
Discharge: HOME | End: 2022-03-31
Payer: MEDICARE

## 2022-03-31 PROCEDURE — 97110 THERAPEUTIC EXERCISES: CPT

## 2022-03-31 NOTE — FLOWSHEET NOTE
[] [facility]) - [facility]  [facility]  [address]  [phone]  F: [phone] [] [address]  [address]   [address]  P: [phone]  F: [phone] [x] [address] &  Therapy  [address]  P: [phone]  F: [phone] [] [address]  P: [phone]  F: [phone] [] [address]  [facility]   [address]   [place]: [phone]  F: [phone]      Physical Therapy Daily Treatment Note    Date:  3/31/2022  Patient Name:  Gabriele Cam. \"Shashank\"   :  1950  MRN: [id number]  Physician: [address] [place] [address]              Insurance: MEDICARE ([employer]  Medical Diagnosis: M75.102 - Unspecified rotator cuff tear or rupture of left shoulder, not specified as traumatic                    Rehab Codes: M25.512, M25.612,   Onset Date: DOS (2/15/22)                           Next 's appt. : 3/23/22  Visit# / total visits: ; Progress note for Medicare patient due at visit 10     Cancels/No Shows: 0/0    Subjective: Sore today, nothing out of the ordinary. Feels ready to start gentle strengthening. Pain:  [x] Yes  [] No  Location: L Shoulder   Pain Rating: (0-10 scale) 4-5/10  Pain altered Tx:  [x] No  [] Yes  Action:  Comments:  3/29/22 was S/P 6 weeeks     Objective:       Today's Treatment:  Modalities:   Precautions: General                            Exercises: Protocol in hard chart   Exercise  L RTC repair     S/P Week 6 (Tue) Reps/ Time Weight/ Level Comments    Pulleys  4'   Flex, scap  x              UT S  3x30\"   Not using L arm x   Levator S  3x30\"   Not using L arm   x   Seated scap retract 2x10, 5\"     x          Supine ABCs 2x   x              Wand abd 10x10\"        Wand ER 10x10\"     x   SL ER 2x15 towel x   SL flexion 2x15   x   SL abd 2x15   x          Prone I x15  (L) x   Prone T x10  (L) x              Wall slides 10x10\"   Flex, abd x          Dowel AAROM 2x10  Standing, jose UEs, flexion               Standing Flexion  2x10   1#   x   Standing Scaption  2x10   1#   x              Conservative strengthening no earlier than week 6                 Isometrics                     Other: Hypervolt L UT, L rhomboids, L lower trap, L lat - held         Treatment Charges: Mins Units   []  Modalities     [x]  Ther Exercise 45 3   []  Manual Therapy     []  Ther Activities     []  Aquatics     []  Vasocompression     []  Other     Total Treatment time 45 3       Assessment: [x] Progressing toward goals. Began session with pulleys to assist with left arm mobility. Wall slides feel \"pinchy\" about 3/4 of the way up but then is okay. Verbal and tactile cuing throughout to monitor upper trap compensation and scapular positioning. Added 1# to standing flexion and scaption as patient is now 6 weeks post op and able to begin conservative strengthening per protocol. Began prone shoulder extension (I) and horizontal abduction (T) for strength and scapular stability. Symptoms under control throughout today's treatment, patient feels a little better during and after exercise compared to pain levels at rest on arrival, rates at 2-3/10. [] No change. [] Other:  [x] Patient would continue to benefit from skilled physical therapy services in order to: Progress L UE strength, ROM, and functional use following RTC repair to improve functional use of shoulder    STG: (to be met in 10 treatments)  ? Pain: Pt to decrease pain levels to 4/10 with all functional use of L arm, helping to improve tolerance to use with activity. ? ROM: Increase flexibility throughout L UE to help improve functional ROM of L UE grossly.   Independent with Home Exercise Programs     LTG: (to be met in 20 treatments)  Improve score of UEFS from 57%

## 2022-04-04 ENCOUNTER — HOSPITAL ENCOUNTER (OUTPATIENT)
Dept: PHYSICAL THERAPY | Facility: CLINIC | Age: 72
Setting detail: THERAPIES SERIES
Discharge: HOME | End: 2022-04-04
Payer: MEDICARE

## 2022-04-04 PROCEDURE — 97110 THERAPEUTIC EXERCISES: CPT

## 2022-04-04 NOTE — PRE-CERTIFICATION NOTE
Medicare Cap   [x] Physical Therapy  [] Speech Therapy  [] Occupational therapy  *PT and Speech caps combine      $2150 Limit for PT and Speech combined  $2150 Limit for OT individually  At the beginning of the month where you expect to go over $2150, please add the [address] [place] [address] modifier      Patient Name: Damaris Sparks: 1950    Note:  This is an estimate of charges billed.      Date of [address] Name # units/ charge $$$ charge Daily Total Charge Ongoing Total $$$   3/14/22 Maik Kimball 1/1 04.37+36.93 120.85 120.85   3/17/22 Therex, manual 2/1 29.21+22.84+21.34 73.39 194.24   3/21/22 Therex 3 29.21+22.84+22.84 74.89 269.13   3/24/22 Therex 3 29.21+22.84+22.84 74.89 344.02   3//28/22 TE 3  74.89 418.91   3/31 TE 3  74.89 493.80   4/4/22 TE 3 29.21+22.84+22.84 74.89 568.69

## 2022-04-04 NOTE — FLOWSHEET NOTE
[] [facility]) [facility] &  Therapy  [address]  [phone]  F: [phone] [] [address]  [facility]   [address]  P: [phone]  F: [phone] [x] [address] &  Therapy  [address]  P: [phone]  F: [phone] [] [address]  P: [phone]  F: [phone] [] [address]  [facility]   [address]   [place]: [phone]  F: [phone]      Physical Therapy Daily Treatment Note    Date:  2022  Patient Name:  Juan Antonio Hartmann. \"Shashank\"   :  1950  MRN: [id number]  Physician: [address] [place] [address]              Insurance: MEDICARE ([employer]  Medical Diagnosis: M75.102 - Unspecified rotator cuff tear or rupture of left shoulder, not specified as traumatic                    Rehab Codes: M25.512, M25.612,   Onset Date: DOS (2/15/22)                           Next 's appt. : 3/23/22  Visit# / total visits: ; Progress note for Medicare patient due at visit 10     Cancels/No Shows: 0/0    Subjective: Pt presenting to PT with continued soreness rating at 3-4/10, but overall no changes since her last treatment. Pain:  [x] Yes  [] No  Location: L Shoulder   Pain Rating: (0-10 scale) 3-4/10  Pain altered Tx:  [x] No  [] Yes  Action:  Comments:      Objective:       Today's Treatment:  Modalities:   Precautions: General                            Exercises: Protocol in hard chart   Exercise  L RTC repair     S/P Week 6 (Tue) Reps/ Time Weight/ Level Comments    Pulleys  4'   Flex, scap  x              UT S  3x30\"   Not using L arm x   Levator S  3x30\"   Not using L arm   x   Seated scap retract 2x10, 5\"     x          Supine ABCs 2x 1#  x              Wand abd 10x10\"        Wand ER 10x10\"        SL ER 3x10 towel x   SL flexion 2x15      SL abd 3x10   x   SL horiz abd 3x10   x          Prone I x15  (L)    Prone T x10  (L)               Wall slides 10x10\"   Flex, abd           Dowel AAROM 2x10  Standing, jose UEs, flexion               Standing Flexion  3x10   1#   x   Standing Scaption  3x10   1#   x              Conservative strengthening no earlier than week 6                 Isometrics 2x10, 5\"   x                 Other: Hypervolt L UT, L rhomboids, L lower trap, L lat - held         Treatment Charges: Mins Units   []  Modalities     [x]  Ther Exercise 47 3   []  Manual Therapy     []  Ther Activities     []  Aquatics     []  Vasocompression     []  Other     Total Treatment time 47 3       Assessment: [x] Progressing toward goals. Able to continue with strength progressions today with increased reps with standing shoulder flexion/scapt, increased reps with SL exercises, adding resistance with ABCs, and adding isometrics. Pt continues to deny any pain throughout session while displaying good exercise recall. Occasional cueing was needed with exercises to avoid UT compensations, good carryover after cued. No increased pain or soreness reported following treatment. [] No change. [] Other:  [x] Patient would continue to benefit from skilled physical therapy services in order to: Progress L UE strength, ROM, and functional use following RTC repair to improve functional use of shoulder    STG: (to be met in 10 treatments)  1. ? Pain: Pt to decrease pain levels to 4/10 with all functional use of L arm, helping to improve tolerance to use with activity. 2. ? ROM: Increase flexibility throughout L UE to help improve functional ROM of L UE grossly. 3. Independent with Home Exercise Programs     LTG: (to be met in 20 treatments)  1.  Improve score of UEFS from 57% impaired to <30% impaired, demonstrating improve functional use of L arm.  2. Improve L UE strength to 4+/5, reducing need for compensations with use of UE with ADLs. 3. Pt will demonstrate L shoulder AROM equal to R in order to reduce need for compensations with functional use. 4. Decrease pain to 0/10     Patient goals: \"Play golf soon\"    Pt. Education:  [x] Yes  [] No  [x] Reviewed Prior HEP/Ed  Method of Education: [x] Verbal  [x] Demo  [] Written  Comprehension of Education:  [x] Verbalizes understanding. [x] Demonstrates understanding. [x] Needs review. [] Demonstrates/verbalizes HEP/Ed previously given. Plan: [x] Continue current frequency toward long and short term goals. [x] Specific Instructions for subsequent treatments: Continue tx per POC      Time In: 0900            Time Out: 0568    Electronically signed by:   Terry Arredondo PT

## 2022-04-07 ENCOUNTER — HOSPITAL ENCOUNTER (OUTPATIENT)
Dept: PHYSICAL THERAPY | Facility: CLINIC | Age: 72
Setting detail: THERAPIES SERIES
Discharge: HOME | End: 2022-04-07
Payer: MEDICARE

## 2022-04-07 DIAGNOSIS — I10 HYPERTENSION, UNSPECIFIED TYPE: Primary | ICD-10-CM

## 2022-04-07 PROCEDURE — 97110 THERAPEUTIC EXERCISES: CPT

## 2022-04-07 NOTE — FLOWSHEET NOTE
[] [facility]) - [facility] &  Therapy  [address]  [phone]  F: [phone] [] [address]  [address]   [address]  P: [phone]  F: [phone] [x] [address] &  Therapy  [address]  P: [phone]  F: [phone] [] [address]  P: [phone]  F: [phone] [] [address]  [facility]   [address]   [place]: [phone]  F: [phone]      Physical Therapy Daily Treatment Note    Date:  2022  Patient Name:  Akosua Fitch \"Shashank\"   :  1950  MRN: [id number]  Physician: [address] [place] [address]              Insurance: MEDICARE ([employer].)  Medical Diagnosis: M75.102 - Unspecified rotator cuff tear or rupture of left shoulder, not specified as traumatic                    Rehab Codes: M25.512, M25.612,   Onset Date: DOS (2/15/22)                           Next 's appt. : 22  Visit# / total visits: ; Progress note for Medicare patient due at visit 10     Cancels/No Shows: 0/0    Subjective: Was not too sore after last treatments progressions. Just a little painful today, no more than usual. To see pain management tomorrow for the first time for unrelated RA. Pain:  [x] Yes  [] No  Location: L Shoulder   Pain Rating: (0-10 scale) 3/10  Pain altered Tx:  [x] No  [] Yes  Action:  Comments:      Objective:       Today's Treatment:  Modalities:   Precautions: General                            Exercises: Protocol in hard chart   Exercise  L RTC repair     S/P Week 7 (Tue) Reps/ Time Weight/ Level Comments    Pulleys  4'   Flex, scap  x              UT S  3x30\"   Not using L arm x   Levator S  3x30\"   Not using L arm   x   Seated scap retract 2x10, 5\"     x          Supine ABCs 2x 1#  x Wand abd 10x10\"        Wand ER 10x10\"     x   SL ER 3x10  towel x   SL flexion 2x15  Elbow bent    SL abd 3x10 1#  x   SL horiz abd 3x10 1#  x          Prone I 2x10 1# (L) x   Prone T x10  (L) x              Wall slides 10x10\"   Flex, abd           Dowel AAROM 2x10  Standing, jose UEs, flexion               Standing Flexion  3x10   1#   x   Standing Scaption  3x10   1#   x                     Isometrics 2x10, 5\"  IR, ER x                 Other: Hypervolt L UT, L rhomboids, L lower trap, L lat - held         Treatment Charges: Mins Units   []  Modalities     [x]  Ther Exercise 45 3   []  Manual Therapy     []  Ther Activities     []  Aquatics     []  Vasocompression     []  Other     Total Treatment time 45 3       Assessment: [x] Progressing toward goals. Patient continues to require verbal and tactile cuing throughout treatment to avoid shoulder shrug. Education on shoulder mechanics and decreasing upper trap compensation. Able to conservatively progress shoulder strength program with symptoms under control. Updated written HEP to include strengthening. Patient progressing as expected with pain under control and demonstrating good understanding of existing exercise. Pain at end of session \"a little better\" compared to on arrival.    [] No change. [] Other:  [x] Patient would continue to benefit from skilled physical therapy services in order to: Progress L UE strength, ROM, and functional use following RTC repair to improve functional use of shoulder    STG: (to be met in 10 treatments)  1. ? Pain: Pt to decrease pain levels to 4/10 with all functional use of L arm, helping to improve tolerance to use with activity. 2. ? ROM: Increase flexibility throughout L UE to help improve functional ROM of L UE grossly. 3. Independent with Home Exercise Programs     LTG: (to be met in 20 treatments)  1.  Improve score of UEFS from 57% impaired to <30% impaired, demonstrating improve functional use of L arm.  2. Improve L UE strength to 4+/5, reducing need for compensations with use of UE with ADLs. 3. Pt will demonstrate L shoulder AROM equal to R in order to reduce need for compensations with functional use. 4. Decrease pain to 0/10     Patient goals: \"Play golf soon\"    Pt. Education:  [x] Yes  [] No  [x] Reviewed Prior HEP/Ed  Method of Education: [x] Verbal  [x] Demo  [x] Written    Access Code: [email or web address]  URL: [email or web address]  Date: 04/07/2022  Prepared by: Rupali Marc    Exercises  Seated Scapular Retraction - 1 x daily - 7 x weekly - 3 sets - 10 reps - 5 hold  Standing Upper Trapezius Stretch - 1 x daily - 7 x weekly - 3 sets - 30 hold  Supine Shoulder Alphabet - 1 x daily - 7 x weekly  Standing Shoulder Flexion to 90 Degrees with Dumbbells - 1 x daily - 7 x weekly - 3 sets - 10 reps  Scaption with Dumbbells - 1 x daily - 7 x weekly - 3 sets - 10 reps  Standing Isometric Shoulder Internal Rotation at Doorway - 1 x daily - 7 x weekly - 3 sets - 10 reps - 5 hold  Isometric Shoulder External Rotation at Wall - 1 x daily - 7 x weekly - 3 sets - 10 reps - 5 hold  Sidelying Shoulder Abduction Palm Forward - 1 x daily - 7 x weekly - 3 sets - 10 reps  Sidelying Shoulder ER with Towel and Dumbbell - 1 x daily - 7 x weekly - 3 sets - 10 reps    Comprehension of Education:  [x] Verbalizes understanding. [x] Demonstrates understanding. [x] Needs review. [] Demonstrates/verbalizes HEP/Ed previously given. Plan: [x] Continue current frequency toward long and short term goals.      [x] Specific Instructions for subsequent treatments: Continue tx per POC      Time In: 09:28 AM            Time Out: 10:15 AM    Electronically signed by:  Rupali Marc PTA

## 2022-04-07 NOTE — PRE-CERTIFICATION NOTE
Medicare Cap   [x] Physical Therapy  [] Speech Therapy  [] Occupational therapy  *PT and Speech caps combine      $2150 Limit for PT and Speech combined  $2150 Limit for OT individually  At the beginning of the month where you expect to go over $2150, please add the [address] [place] modifier      Patient Name: Allyn Eduardo: 1950    Note:  This is an estimate of charges billed.      Date of [address] Name # units/ charge $$$ charge Daily Total Charge Ongoing Total $$$   3/14/22 Signa Pine 1/1 98.38+02.17 120.85 120.85   3/17/22 Therex, manual 2/1 29.21+22.84+21.34 73.39 194.24   3/21/22 Therex 3 29.21+22.84+22.84 74.89 269.13   3/24/22 Therex 3 29.21+22.84+22.84 74.89 344.02   3//28/22 TE 3  74.89 418.91   3/31 TE 3  74.89 493.80   4/4/22 TE 3 29.21+22.84+22.84 74.89 568.69   4/7/22 TE 3  74.89 643.58

## 2022-04-08 ENCOUNTER — INITIAL CONSULT (OUTPATIENT)
Dept: PAIN MANAGEMENT | Age: 72
End: 2022-04-08
Payer: MEDICARE

## 2022-04-08 ENCOUNTER — HOSPITAL ENCOUNTER (OUTPATIENT)
Age: 72
Setting detail: SPECIMEN
Discharge: HOME | End: 2022-04-08

## 2022-04-08 VITALS — WEIGHT: 224.2 LBS | BODY MASS INDEX: 35.19 KG/M2 | HEIGHT: 67 IN

## 2022-04-08 DIAGNOSIS — M19.90 INFLAMMATORY ARTHROPATHY: Primary | ICD-10-CM

## 2022-04-08 DIAGNOSIS — M06.9 RHEUMATOID ARTHRITIS, INVOLVING UNSPECIFIED SITE, UNSPECIFIED WHETHER RHEUMATOID FACTOR PRESENT (HCC): ICD-10-CM

## 2022-04-08 DIAGNOSIS — Z79.891 ENCOUNTER FOR LONG-TERM OPIATE ANALGESIC USE: ICD-10-CM

## 2022-04-08 DIAGNOSIS — M19.90 INFLAMMATORY ARTHROPATHY: ICD-10-CM

## 2022-04-08 DIAGNOSIS — G89.29 OTHER CHRONIC PAIN: ICD-10-CM

## 2022-04-08 PROCEDURE — 4040F PNEUMOC VAC/ADMIN/RCVD: CPT | Performed by: PAIN MEDICINE

## 2022-04-08 PROCEDURE — G8417 CALC BMI ABV UP PARAM F/U: HCPCS | Performed by: PAIN MEDICINE

## 2022-04-08 PROCEDURE — 3017F COLORECTAL CA SCREEN DOC REV: CPT | Performed by: PAIN MEDICINE

## 2022-04-08 PROCEDURE — 99204 OFFICE O/P NEW MOD 45 MIN: CPT | Performed by: PAIN MEDICINE

## 2022-04-08 PROCEDURE — 1123F ACP DISCUSS/DSCN MKR DOCD: CPT | Performed by: PAIN MEDICINE

## 2022-04-08 PROCEDURE — G8427 DOCREV CUR MEDS BY ELIG CLIN: HCPCS | Performed by: PAIN MEDICINE

## 2022-04-08 PROCEDURE — 1036F TOBACCO NON-USER: CPT | Performed by: PAIN MEDICINE

## 2022-04-08 NOTE — PROGRESS NOTES
HPI:     Shoulder Pain   The pain is present in the left shoulder. This is a chronic problem. The current episode started more than 1 month ago. There has been no history of extremity trauma. The problem occurs intermittently. The problem has been gradually improving. The quality of the pain is described as aching, burning, pounding and sharp. The pain is at a severity of 7/10. The pain is severe. Associated symptoms include a limited range of motion. Pertinent negatives include no joint locking, joint swelling, numbness, stiffness or tingling. The symptoms are aggravated by activity and standing. He has tried oral narcotics, acetaminophen, OTC ointments, heat and cold (Physical Therapy) for the symptoms. The treatment provided mild relief. Family history includes gout and rheumatoid arthritis. His past medical history is significant for osteoarthritis and rheumatoid arthritis. There is no history of diabetes or gout. Diffuse pain complaints. Pain in multiple joints. History of rheumatoid arthritis. Was following with rheumatology at [facility] who has since retired. He has had surgery on both shoulders. He has a left knee replacement. At some point considering a right knee replacement. He was maintained on Percocet as needed for several years. He is on methotrexate and Mobic. Patient denies any new neurological symptoms. No bowel or bladder incontinence, no weakness, and no falling. Review of OARRS does not show any aberrant prescription behavior.      Past Medical History:   Diagnosis Date    Calcium pyrophosphate deposition disease     Diverticulosis of colon     Hypertension     Osteoarthritis        Past Surgical History:   Procedure Laterality Date    COLONOSCOPY  11/26/2008    diverticulosis    COLONOSCOPY  12/29/2017    Diverticulosis    HERNIA REPAIR      PILONIDAL CYST EXCISION      ND COLON CA SCRN NOT  [address] IND N/A 12/29/2017    COLONOSCOPY performed by Li Cervantes MD at [address] Right 2020    TOTAL KNEE ARTHROPLASTY Left 2016    [place]/Dr. Mac       No Known Allergies      Current Outpatient Medications:     metoprolol tartrate (LOPRESSOR) 25 MG tablet, Take one tab by mouth bid, Disp: 180 tablet, Rfl: 1    atorvastatin (LIPITOR) 20 MG tablet, TAKE 1 TABLET BY MOUTH EVERY DAY, Disp: 90 tablet, Rfl: 1    ELDERBERRY PO, Take 2 tablets by mouth daily, Disp: , Rfl:     Multiple Vitamins-Minerals (PRESERVISION/LUTEIN PO), Take 2 capsules by mouth daily, Disp: , Rfl:     Multiple Vitamins-Minerals (THERAGRAN-M PO), Take 1 tablet by mouth daily, Disp: , Rfl:     enalapril (VASOTEC) 10 MG tablet, TAKE 1 TABLET BY MOUTH EVERY DAY, Disp: 90 tablet, Rfl: 0    amLODIPine (NORVASC) 10 MG tablet, TAKE 1 TABLET BY MOUTH EVERY DAY, Disp: 90 tablet, Rfl: 0    folic acid (FOLVITE) 1 MG tablet, daily, Disp: , Rfl:     methotrexate (RHEUMATREX) 2.5 MG chemo tablet, Take 2.5 mg by mouth 10 tablets weekly, Disp: , Rfl:     meloxicam (MOBIC) 15 MG tablet, Take by mouth daily, Disp: , Rfl: 1    oxyCODONE-acetaminophen (PERCOCET) 5-325 MG per tablet, Take by mouth every 4 hours as needed, Disp: , Rfl: 0    Multiple Vitamins-Minerals (MULTIVITAMIN PO), Take  by mouth., Disp: , Rfl:     CALCIUM PO, Take  by mouth., Disp: , Rfl:     No family history on file.     Social History     Socioeconomic History    Marital status:      Spouse name: Not on file    Number of children: Not on file    Years of education: Not on file    Highest education level: Not on file   Occupational History    Not on file   Tobacco Use    Smoking status: Former Smoker     Packs/day: 1.50     Years: 25.00     Pack years: 45.53     Quit date: 1980     Years since quittin.2    Smokeless tobacco: Never Used    Tobacco comment: estimated quit date   Substance and Sexual Activity    Alcohol use: No    Drug use: No    Sexual activity: Not on file   Other Topics Concern    Not on file   Social History Narrative    Not on file     Social Determinants of Health     Financial Resource Strain: Low Risk     Difficulty of Paying Living Expenses: Not hard at all   Food Insecurity: No Food Insecurity    Worried About Running Out of Food in the Last Year: Never true    [address] in the Last Year: Never true   Transportation Needs: No Transportation Needs    Lack of Transportation (Medical): No    Lack of Transportation (Non-Medical): No   Physical Activity: Inactive    Days of Exercise per Week: 0 days    Minutes of Exercise per Session: 0 min   Stress:     Feeling of Stress : Not on file   Social Connections:     Frequency of Communication with Friends and Family: Not on file    Frequency of Social Gatherings with Friends and Family: Not on file    Attends Advent Services: Not on file    Active Member of Clubs or Organizations: Not on file    Attends Club or Organization Meetings: Not on file    Marital Status: Not on file   Intimate Partner Violence: Not At Risk    Fear of Current or Ex-Partner: No    Emotionally Abused: No    Physically Abused: No    Sexually Abused: No   Housing Stability:     Unable to Pay for Housing in the Last Year: Not on file    Number of [place] in the Last Year: Not on file    Unstable Housing in the Last Year: Not on file       Review of Systems:  Review of Systems   Musculoskeletal: Negative for gout and stiffness. Neurological: Negative for numbness and tingling. Physical Exam:  Ht 5' 7\" (1.702 m)   Wt 224 lb 3.2 oz (101.7 kg)   BMI 35.11 kg/m²     Physical Exam  Constitutional:       Appearance: Normal appearance. Pulmonary:      Effort: Pulmonary effort is normal.   Neurological:      Mental Status: He is alert.    Psychiatric:         Attention and Perception: Attention and perception normal.         Mood and Affect: Mood and affect normal.         Record/Diagnostics Review:    As above, I did review the imaging    Orders:  Orders Placed This Encounter   Procedures    DRUG SCREEN, PAIN       Assessment:  1. Inflammatory arthropathy    2. Other chronic pain    3. Encounter for long-term opiate analgesic use    4. Rheumatoid arthritis, involving unspecified site, unspecified whether rheumatoid factor present [facility])        Treatment Plan:  DISCUSSION: Treatment options discussed with patient and all questions answered to patient's satisfaction. OARRS Review: Reviewed and acceptable for medications prescribed. TREATMENT OPTIONS:     Discussed different treatment options including continued conservative care such as physical therapy, chiropractic care, acupuncture. Discussed different interventional options such as epidural steroids or medial branch blocks. Also discussed neuromodulation in the form of spinal cord stimulation. Also discussed surgical evaluation. History of rheumatoid arthritis and diffuse joint pains. Chart review ordered shows he has been stable and compliant with his medication. We will need a urine drug screen today for standard monitoring purposes. If appropriate I think is reasonable to maintain him on his current medication regimen. We will need to sign a pain contract. Iris Sutton M.D. I have reviewed the chief complaint and history of present illness (including ROS and PFSH) and vital documentation by my staff and I agree with their documentation and have added where applicable.

## 2022-04-11 ENCOUNTER — HOSPITAL ENCOUNTER (OUTPATIENT)
Dept: PHYSICAL THERAPY | Facility: CLINIC | Age: 72
Setting detail: THERAPIES SERIES
Discharge: HOME | End: 2022-04-11
Payer: MEDICARE

## 2022-04-11 PROCEDURE — 97110 THERAPEUTIC EXERCISES: CPT

## 2022-04-11 NOTE — PRE-CERTIFICATION NOTE
Medicare Cap   [x] Physical Therapy  [] Speech Therapy  [] Occupational therapy  *PT and Speech caps combine      $2150 Limit for PT and Speech combined  $2150 Limit for OT individually  At the beginning of the month where you expect to go over $2150, please add the [address] [place] modifier      Patient Name: Larissa Sorenson: 1950    Note:  This is an estimate of charges billed.      Date of [address] Name # units/ charge $$$ charge Daily Total Charge Ongoing Total $$$   3/14/22 Cindi Rasmussen 1/1 69.37+24.08 120.85 120.85   3/17/22 Therex, manual 2/1 29.21+22.84+21.34 73.39 194.24   3/21/22 Therex 3 29.21+22.84+22.84 74.89 269.13   3/24/22 Therex 3 29.21+22.84+22.84 74.89 344.02   3//28/22 TE 3  74.89 418.91   3/31 TE 3  74.89 493.80   4/4/22 TE 3 29.21+22.84+22.84 74.89 568.69   4/7/22 TE 3  74.89 643.58   4/11/22 TE 3  74.89 718.47

## 2022-04-11 NOTE — FLOWSHEET NOTE
[] [facility]) - [facility] &  Therapy  [address]  [phone]  F: [phone] [] [address]  [facility] [facility]   [address]  P: [phone]  F: [phone] [x] [address] &  Therapy  [address] [facility]  P: [phone]  F: [phone] [] [address]  P: [phone]  F: [phone] [] [address]  [facility]   [address]   [place]: [phone]  F: [phone]      Physical Therapy Daily Treatment Note    Date:  2022  Patient Name:  Shawn Chinchilla. \"Shashank\"   :  1950  MRN: [id number]  Physician: [address] [place] [address]              Insurance: MEDICARE (Med. Nec.)  Medical Diagnosis: M75.102 - Unspecified rotator cuff tear or rupture of left shoulder, not specified as traumatic                    Rehab Codes: M25.512, M25.612,   Onset Date: DOS (2/15/22)                           Next 's appt. : 22  Visit# / total visits: ; Progress note for Medicare patient due at visit 10     Cancels/No Shows: 0/0    Subjective: Felt pretty good over the weekend. Having less pain overall. Got equipment to set up pulleys at home and has 2# dumbbells. Pain:  [] Yes  [x] No  Location: L Shoulder   Pain Rating: (0-10 scale) 0/10  Pain altered Tx:  [x] No  [] Yes  Action:  Comments:      Objective:       Today's Treatment:  Modalities:   Precautions: General                            Exercises: Protocol in hard chart   Exercise  L RTC repair     S/P Week 7 (Tue) Reps/ Time Weight/ Level Comments    Pulleys  4'   Flex, scap  x              UT S  3x30\"   Not using L arm x   Levator S  3x30\"   Not using L arm   x   Seated scap retract 2x15, 5\"     x          Supine ABCs 2x 1#  x              Wand abd 10x10\"        Wand ER 10x10\"     x   SL ER 3x10 1# towel x   SL flexion 2x10 1# Elbow bent x   SL abd 3x10 1#  x   SL horiz abd 3x10 1#  x                 Prone Row 2x10 1#     Prone I 3x10 1# (L) x   Prone T x10  (L) x              Wall slides 10x10\"   Flex, abd           Dowel AAROM 2x10  Standing, jose UEs, flexion               Standing Flexion  3x10   1#   x   Standing Scaption  3x10   1#   x                     Isometrics 10x10\"   IR, ER x                 Other: Hypervolt L UT, L rhomboids, L lower trap, L lat - held         Treatment Charges: Mins Units   []  Modalities     [x]  Ther Exercise 45 3   []  Manual Therapy     []  Ther Activities     []  Aquatics     []  Vasocompression     []  Other     Total Treatment time 45 3       Assessment: [x] Progressing toward goals. Initiated session with pulleys for improved blood flow and left shoulder mobility. Patient demonstrating improved understanding of shoulder mechanics with less compensation needing corrected. Progressing in strength program conservatively with symptoms under control. \"Feels pretty good\" at end of session, 0/10 pain and not too fatigued. Progress note due at next visit, will be 8 weeks post-op at that time. [] No change. [] Other:  [x] Patient would continue to benefit from skilled physical therapy services in order to: Progress L UE strength, ROM, and functional use following RTC repair to improve functional use of shoulder    STG: (to be met in 10 treatments)  1. ? Pain: Pt to decrease pain levels to 4/10 with all functional use of L arm, helping to improve tolerance to use with activity. 2. ? ROM: Increase flexibility throughout L UE to help improve functional ROM of L UE grossly. 3. Independent with Home Exercise Programs     LTG: (to be met in 20 treatments)  1. Improve score of UEFS from 57% impaired to <30% impaired, demonstrating improve functional use of L arm.  2. Improve L UE strength to 4+/5, reducing need for compensations with use of UE with ADLs.   3. Pt will demonstrate L shoulder AROM equal to R in order to reduce need for compensations with functional use. 4. Decrease pain to 0/10     Patient goals: \"Play golf soon\"    Pt. Education:  [x] Yes  [] No  [x] Reviewed Prior HEP/Ed  Method of Education: [x] Verbal  [x] Demo  [] Written    Comprehension of Education:  [x] Verbalizes understanding. [x] Demonstrates understanding. [] Needs review. [] Demonstrates/verbalizes HEP/Ed previously given. Plan: [x] Continue current frequency toward long and short term goals.      [x] Specific Instructions for subsequent treatments: Continue tx per POC      Time In: 09:28 AM            Time Out: 10:20 AM    Electronically signed by:  Radha Mahoney PTA

## 2022-04-13 LAB
6-ACETYLMORPHINE, UR: NOT DETECTED
7-AMINOCLONAZEPAM, URINE: NOT DETECTED
ALPHA-OH-ALPRAZ, URINE: NOT DETECTED
ALPHA-OH-MIDAZOLAM, URINE: NOT DETECTED
ALPRAZOLAM, URINE: NOT DETECTED
AMPHETAMINES, URINE: NOT DETECTED
BARBITURATES, URINE: NOT DETECTED
BENZOYLECGONINE, UR: NOT DETECTED
BUPRENORPHINE URINE: NOT DETECTED
CARISOPRODOL, UR: NOT DETECTED
CLONAZEPAM, URINE: NOT DETECTED
CODEINE, URINE: NOT DETECTED
CREATININE URINE: 35.1 MG/DL (ref 20–400)
DIAZEPAM, URINE: NOT DETECTED
DRUGS EXPECTED, UR: NORMAL
EER HI RES INTERP UR: NORMAL
ETHYL GLUCURONIDE UR: NOT DETECTED
FENTANYL URINE: NOT DETECTED
GABAPENTIN: NOT DETECTED
HYDROCODONE, URINE: NOT DETECTED
HYDROMORPHONE, URINE: NOT DETECTED
LORAZEPAM, URINE: NOT DETECTED
MARIJUANA METAB, UR: NOT DETECTED
MDA, UR: NOT DETECTED
MDEA, EVE, UR: NOT DETECTED
MDMA URINE: NOT DETECTED
MEPERIDINE METAB, UR: NOT DETECTED
METHADONE, URINE: NOT DETECTED
METHAMPHETAMINE, URINE: NOT DETECTED
METHYLPHENIDATE: NOT DETECTED
MIDAZOLAM, URINE: NOT DETECTED
MORPHINE URINE: NOT DETECTED
NALOXONE URINE: NOT DETECTED
NORBUPRENORPHINE, URINE: NOT DETECTED
NORDIAZEPAM, URINE: NOT DETECTED
NORFENTANYL, URINE: NOT DETECTED
NORHYDROCODONE, URINE: NOT DETECTED
NOROXYCODONE, URINE: PRESENT
NOROXYMORPHONE, URINE: PRESENT
OXAZEPAM, URINE: NOT DETECTED
OXYCODONE URINE: PRESENT
OXYMORPHONE, URINE: PRESENT
PAIN MANAGEMENT DRUG PANEL INTERP, URINE: NORMAL
PAIN MGT DRUG PANEL, HI RES, UR: NORMAL
PCP,URINE: NOT DETECTED
PHENTERMINE, UR: NOT DETECTED
PREGABALIN: NOT DETECTED
TAPENTADOL, URINE: NOT DETECTED
TAPENTADOL-O-SULFATE, URINE: NOT DETECTED
TEMAZEPAM, URINE: NOT DETECTED
TRAMADOL, URINE: NOT DETECTED
ZOLPIDEM METABOLITE (ZCA), URINE: NOT DETECTED
ZOLPIDEM, URINE: NOT DETECTED

## 2022-04-14 ENCOUNTER — HOSPITAL ENCOUNTER (OUTPATIENT)
Dept: PHYSICAL THERAPY | Facility: CLINIC | Age: 72
Setting detail: THERAPIES SERIES
Discharge: HOME | End: 2022-04-14
Payer: MEDICARE

## 2022-04-14 PROCEDURE — 97110 THERAPEUTIC EXERCISES: CPT

## 2022-04-14 NOTE — PROGRESS NOTES
[] [facility]) - [facility] &  Therapy  [address]  [phone]  F: [phone] [] [address]  [address]   [address]  P: [phone]  F: [phone] [x] [address] &  Therapy  [address] [facility]  P: [phone]  F: [phone] [] [email or web address]  P: [phone]  F: [phone] [] [address]  [facility]   [address]   [place]: [phone]  F: [phone]      Physical Therapy Daily Treatment Note/ Progress Report    Date:  2022  Patient Name:  True Backers. \"Shashank\"   :  1950  MRN: [id number]  Physician: [address] [place] [address]              Insurance: MEDICARE (Med[employer]ec.)  Medical Diagnosis: M75.102 - Unspecified rotator cuff tear or rupture of left shoulder, not specified as traumatic                    Rehab Codes: M25.512, M25.612,   Onset Date: DOS (2/15/22)                           Next 's appt. : 22  Visit# / total visits: 10/20; Progress note for Medicare patient due at visit 10     Cancels/No Shows: 0/0    Subjective: No complaints upon arrival. States that he felt fine following last treatment. Pain:  [] Yes  [x] No  Location: L Shoulder   Pain Rating: (0-10 scale) 0/10  Pain altered Tx:  [x] No  [] Yes  Action:  Comments:      Objective:       Today's Treatment:  Modalities:   Precautions: General                            Exercises: Protocol in hard chart   Exercise  L RTC repair     S/P Week 7 (Tue) Reps/ Time Weight/ Level Comments    UBE  6'    x              UT S  3x30\"   Not using L arm    Levator S  3x30\"   Not using L arm      Seated scap retract 2x15, 5\"        IR Towel S 3x30\"   x          Supine ABCs 2x 1#                Wand abd 10x10\"        Wand ER 10x10\"        SL ER 3x15 1# towel x   SL flexion 2x10 1# Elbow bent    SL abd 3x15 1#  x   SL horiz abd 3x15 1#  x                 Prone Row 2x10 1#     Prone I 3x10 1# (L)    Prone T x10  (L)               Wall slides 10x10\"   Flex, abd x          Dowel AAROM 2x10  Standing, jose UEs, flexion               Standing Flexion  3x10   1#  Use 2# next treatment x   Standing Scaption  3x10   1#   x                     Isometrics 10x10\"   IR, ER                  T-band:       Rows 2x12, 3\" Lime  x   Ext 2x12, 3\" Lime  x   ER/IR walkouts x10 Lime  x          Other: Hypervolt L UT, L rhomboids, L lower trap, L lat - held        ROM            A/P ROM A/P STRENGTH  STRENGTH    LEFT RIGHT LEFT RIGHT   SEATED SHLD FLEX   XXXXXXXX XXXXXXXXX   SEATED SHD ABD   XXXXXXXX XXXXXXXXX   SEATED IR   XXXXXXXX XXXXXXXX          SHLD FLEX  156     SHLD ABD  161     SHLD ER  70     SHLD IR  60     SUPRASPINATUS              ELBOW FLEX       ELBOW EXT. Treatment Charges: Mins Units   []  Modalities     [x]  Ther Exercise 47 3   []  Manual Therapy     []  Ther Activities     []  Aquatics     []  Vasocompression     []  Other     Total Treatment time 47 3       Assessment: [x] Progressing toward goals. Continued progressing reps with standing flexion and all SL exercises completed today to continue working on improving strength with good technique displayed and good tolerance reported. Also able to progress pt with addition of banded scapular stabilizer exercises to continue progressing scapular stabilizer strength with good technique and tolerance displayed. Pt continues to deny and pain with any exercises performed throughout treatment, and reports no pain following completion of session. Since starting therapy, pt has demonstrated improved L shoulder strength and ROM, as well as demonstrating better overall function and use with activity.  At this time, planning to continue with PT per POC to keep working on progressing strength and motion to allow for return to full use with L UE, with pt in agreement with plan. Continue PT at this time. [] No change. [] Other:  [x] Patient would continue to benefit from skilled physical therapy services in order to: Progress L UE strength, ROM, and functional use following RTC repair to improve functional use of shoulder    STG: (to be met in 10 treatments)  1. ? Pain: Pt to decrease pain levels to 4/10 with all functional use of L arm, helping to improve tolerance to use with activity.: MET (4/14/22)  2. ? ROM: Increase flexibility throughout L UE to help improve functional ROM of L UE grossly.: ONGOING (4/14/22)  3. Independent with Home Exercise Programs: MET (4/14/22)     LTG: (to be met in 20 treatments)  1. Improve score of UEFS from 57% impaired to <30% impaired, demonstrating improve functional use of L arm.  2. Improve L UE strength to 4+/5, reducing need for compensations with use of UE with ADLs. 3. Pt will demonstrate L shoulder AROM equal to R in order to reduce need for compensations with functional use. 4. Decrease pain to 0/10     Patient goals: \"Play golf soon\"    Pt. Education:  [x] Yes  [] No  [x] Reviewed Prior HEP/Ed  Method of Education: [x] Verbal  [x] Demo  [] Written  Comprehension of Education:  [x] Verbalizes understanding. [x] Demonstrates understanding. [x] Needs review. [] Demonstrates/verbalizes HEP/Ed previously given. Plan: [x] Continue current frequency toward long and short term goals. [x] Specific Instructions for subsequent treatments: Continue tx per POC      Time In: 0900           Time Out: 7883    Electronically signed by:   Consuelo Clement PT

## 2022-04-19 ENCOUNTER — HOSPITAL ENCOUNTER (OUTPATIENT)
Dept: PHYSICAL THERAPY | Facility: CLINIC | Age: 72
Setting detail: THERAPIES SERIES
Discharge: HOME | End: 2022-04-19
Payer: MEDICARE

## 2022-04-19 PROCEDURE — 97110 THERAPEUTIC EXERCISES: CPT

## 2022-04-19 NOTE — PRE-CERTIFICATION NOTE
Medicare Cap   [x] Physical Therapy  [] Speech Therapy  [] Occupational therapy  *PT and Speech caps combine      $2150 Limit for PT and Speech combined  $2150 Limit for OT individually  At the beginning of the month where you expect to go over $2150, please add the [address] [place] [address] modifier      Patient Name: Sherrie Alfonso: 1950    Note:  This is an estimate of charges billed.      Date of [address] Name # units/ charge $$$ charge Daily Total Charge Ongoing Total $$$   3/14/22 Sarai Lee 1/1 77.66+65.41 120.85 120.85   3/17/22 Therex, manual 2/1 29.21+22.84+21.34 73.39 194.24   3/21/22 Therex 3 29.21+22.84+22.84 74.89 269.13   3/24/22 Therex 3 29.21+22.84+22.84 74.89 344.02   3//28/22 TE 3  74.89 418.91   3/31 TE 3  74.89 493.80   4/4/22 TE 3 29.21+22.84+22.84 74.89 568.69   4/7/22 TE 3  74.89 643.58   4/11/22 TE 3  74.89 718.47   4/14/22 TE 3 29.21+22.84+22.84 74.89 793.36   4/19/22 TE 3 29.21+22.84+22.84 74.89 868.25

## 2022-04-19 NOTE — PROGRESS NOTES
[] [facility]) - [facility] &  Therapy  [address]  [phone]  F: [phone] [] [address]  [facility] [facility]   [address]  P: [phone]  F: [phone] [x] [address] &  Therapy  [address] [facility]  P: [phone]  F: [phone] [] [address]  P: [phone]  F: [phone] [] [address]  [facility]   [address]   [place]: [phone]  F: [phone]      Physical Therapy Daily Treatment Note/ Progress Report    Date:  2022  Patient Name:  Hudson Sandoval. \"Shashank\"   :  1950  MRN: [id number]  Physician: [address] [place] [address]              Insurance: MEDICARE ([employer]  Medical Diagnosis: M75.102 - Unspecified rotator cuff tear or rupture of left shoulder, not specified as traumatic                    Rehab Codes: M25.512, M25.612,   Onset Date: DOS (2/15/22)                           Next 's appt. : 22  Visit# / total visits: ; Progress note for Medicare patient due at visit 10     Cancels/No Shows: 0/0    Subjective: Pt stated he is experiencing some pain today however it was about a \"7/10 over the weekend, I was messing with a trash can and fighting the wind\". Denies any soreness after progressions last session. Pain:  [] Yes  [x] No  Location: L Shoulder   Pain Rating: (0-10 scale) 3/10  Pain altered Tx:  [x] No  [] Yes  Action:  Comments:      Objective:       Today's Treatment:  Modalities:   Precautions: General                            Exercises: Protocol in hard chart   Exercise  L RTC repair     S/P Week 8 (Tue) Reps/ Time Weight/ Level Comments    UBE  6'    x              UT S  3x30\"   Not using L arm x   Levator S  3x30\"   Not using L arm   x   Seated scap retract 2x15, 5\"        IR Towel S 3x30\"   x          Supine ABCs 2x 1#                Wand abd 10x10\"        Wand ER 10x10\"        SL ER 3x15 2# towel x   SL flexion 2x10 1# Elbow bent    SL abd 3x15 2#  x   SL horiz abd 3x15 2#  x                 Prone Row 2x10 1#     Prone I 3x10 1# (L)    Prone T x10  (L)               Wall slides 10x10\"   Flex, abd x          Dowel AAROM 2x10  Standing, jose UEs, flexion               Standing Flexion  3x10   2#   x   Standing Scaption  3x10   2#   x                     Isometrics 10x10\"   IR, ER                  T-band:       Rows 2x15, 3\" Lime  x   Ext 2x12, 3\" Lime  x   ER/IR walkouts x10 Lime  x          Other: Hypervolt L UT, L rhomboids, L lower trap, L lat - held       Treatment Charges: Mins Units   []  Modalities     [x]  Ther Exercise 45 3   []  Manual Therapy     []  Ther Activities     []  Aquatics     []  Vasocompression     []  Other     Total Treatment time 45 3       Assessment: [x] Progressing toward goals. Initiated session with UBE for warmup followed by stretching to promote blood flow and flexibility. Increased weight tolerated with SL ex this date as pt stated he is currently using a 2# with HEP. No c/o increased pain or fatigue displayed. Continued to progress strengthening with the addition of 2# to standing scaption/flexion with vc's provided to reduce UT compensations. Pt denies any discomfort or difficulty with performance. Ended tx with TB work to enhance scapular stabilization with increased reps tolerated. Pt stated his arm felt \"better\" post tx, provided lime band for HEP. [] No change.      [] Other:  [x] Patient would continue to benefit from skilled physical therapy services in order to: Progress L UE strength, ROM, and functional use following RTC repair to improve functional use of shoulder    STG: (to be met in 10 treatments)  1. ? Pain: Pt to decrease pain levels to 4/10 with all functional use of L arm, helping to improve tolerance to use with activity.: MET (4/14/22)  2. ? ROM: Increase flexibility throughout L UE to help improve functional ROM of L UE grossly.: ONGOING (4/14/22)  3. Independent with Home Exercise Programs: MET (4/14/22)     LTG: (to be met in 20 treatments)  1. Improve score of UEFS from 57% impaired to <30% impaired, demonstrating improve functional use of L arm.  2. Improve L UE strength to 4+/5, reducing need for compensations with use of UE with ADLs. 3. Pt will demonstrate L shoulder AROM equal to R in order to reduce need for compensations with functional use. 4. Decrease pain to 0/10     Patient goals: \"Play golf soon\"    Pt. Education:  [x] Yes  [] No  [x] Reviewed Prior HEP/Ed  Method of Education: [x] Verbal  [x] Demo  [] Written  Comprehension of Education:  [x] Verbalizes understanding. [x] Demonstrates understanding. [x] Needs review. [] Demonstrates/verbalizes HEP/Ed previously given. Plan: [x] Continue current frequency toward long and short term goals. [x] Specific Instructions for subsequent treatments: Continue tx per POC to progress strength and ROM      Time In: 8:53 am           Time Out: 9:42 am    Electronically signed by:   Mandie Echeverria

## 2022-04-21 ENCOUNTER — HOSPITAL ENCOUNTER (OUTPATIENT)
Dept: PHYSICAL THERAPY | Facility: CLINIC | Age: 72
Setting detail: THERAPIES SERIES
Discharge: HOME | End: 2022-04-21
Payer: MEDICARE

## 2022-04-21 PROCEDURE — 97110 THERAPEUTIC EXERCISES: CPT

## 2022-04-21 NOTE — FLOWSHEET NOTE
[] [facility] &  Therapy  [address]  [phone]  F: [phone] [] [address]  [address]   [address]  P: [phone]  F: [phone] [x] [address]  [facility]patient [facility] &  Therapy  [address] [facility] [address]  P: [phone]  F: [phone] [] [address]  P: [phone]  F: [phone] [] [address]  [facility]   [address]   [place]: [phone]  F: [phone]      Physical Therapy Daily Treatment Note/ Progress Report    Date:  2022  Patient Name:  Marjorie Stanford. \"Shashank\"   :  1950  MRN: [id number]  Physician: [address] [place] [address]              Insurance: MEDICARE ([employer].)  Medical Diagnosis: M75.102 - Unspecified rotator cuff tear or rupture of left shoulder, not specified as traumatic                    Rehab Codes: M25.512, M25.612,   Onset Date: DOS (2/15/22)                           Next 's appt. : 22  Visit# / total visits: ; Progress note for Medicare patient due at visit 20     Cancels/No Shows: 0/0    Subjective: Feels pretty good today. No pain on arrival. Usually feels better after leaving therapy sessions. Pain:  [] Yes  [x] No  Location: L Shoulder   Pain Rating: (0-10 scale) 0/10  Pain altered Tx:  [x] No  [] Yes  Action:  Comments:      Objective:       Today's Treatment:  Modalities:   Precautions: General                            Exercises: Protocol in hard chart   Exercise  L RTC repair     S/P Week 9 () Reps/ Time Weight/ Level Comments    UBE  6'    x              UT S  3x30\"   Not using L arm x   Levator S  3x30\"   Not using L arm   x   Seated scap retract 2x15, 5\"        IR Towel S 3x30\"   x          Supine ABCs  1#                Wand abd 10x10\"        Wand ER 10x10\" SL ER 3x15 2# towel x   SL flexion 2x10 1# Elbow bent    SL abd 3x15 2#  x   SL horiz abd 3x15 2#  x          Prone Row 2x10 1#     Prone I 3x10 1# (L)    Prone T x10  (L)               Wall slides 10x10\"   Flex, abd x          Dowel AAROM 2x10  Standing, jose UEs, flexion               Standing Flexion  3x10   2#   x   Standing Scaption  3x10   2#   x                     Isometrics 10x10\"   IR, ER           T-band:       Rows 2x10, 3\" blue  x   Ext 2x10, 3\" blue  x   ER/IR walkouts x15 ea Lime  x          Other: Hypervolt L UT, L rhomboids, L lower trap, L lat - held       Treatment Charges: Mins Units   []  Modalities     [x]  Ther Exercise 45 3   []  Manual Therapy     []  Ther Activities     []  Aquatics     []  Vasocompression     []  Other     Total Treatment time 45 3       Assessment: [x] Progressing toward goals. Verbal and tactile cuing throughout exercise to prevent compensation and promote proper scapular position. Displays upper trap compensation upon fatigue during standing shoulder flexion and scaption. Increased resistance of rows and shoulder extension while demonstrating good mechanics and no complaints of increased symptoms. Reminders to keep shoulder in neutral position during resisted external rotation walk outs, tends to internally rotate. 0/10 post treatment pain, just feels fatigued. [] No change. [] Other:  [x] Patient would continue to benefit from skilled physical therapy services in order to: Progress L UE strength, ROM, and functional use following RTC repair to improve functional use of shoulder    STG: (to be met in 10 treatments)  1. ? Pain: Pt to decrease pain levels to 4/10 with all functional use of L arm, helping to improve tolerance to use with activity.: MET (4/14/22)  2. ? ROM: Increase flexibility throughout L UE to help improve functional ROM of L UE grossly.: ONGOING (4/14/22)  3.  Independent with Home Exercise Programs: MET (4/14/22)     LTG: (to be met in 20 treatments)  1. Improve score of UEFS from 57% impaired to <30% impaired, demonstrating improve functional use of L arm.  2. Improve L UE strength to 4+/5, reducing need for compensations with use of UE with ADLs. 3. Pt will demonstrate L shoulder AROM equal to R in order to reduce need for compensations with functional use. 4. Decrease pain to 0/10     Patient goals: \"Play golf soon\"    Pt. Education:  [x] Yes  [] No  [] Reviewed Prior HEP/Ed  Method of Education: [x] Verbal  [x] Demo  [] Written  Comprehension of Education:  [x] Verbalizes understanding. [x] Demonstrates understanding. [x] Needs review. [] Demonstrates/verbalizes HEP/Ed previously given. Plan: [x] Continue current frequency toward long and short term goals.      [x] Specific Instructions for subsequent treatments: Continue tx per POC to progress strength and ROM      Time In: 10:25 am           Time Out: 11:15 am    Electronically signed by:  Rachel Humphrey PTA

## 2022-04-21 NOTE — PRE-CERTIFICATION NOTE
Medicare Cap   [x] Physical Therapy  [] Speech Therapy  [] Occupational therapy  *PT and Speech caps combine      $2150 Limit for PT and Speech combined  $2150 Limit for OT individually  At the beginning of the month where you expect to go over $2150, please add the [address] [place] modifier      Patient Name: Caleb Sagastume: 1950    Note:  This is an estimate of charges billed.      Date of [address] Name # units/ charge $$$ charge Daily Total Charge Ongoing Total $$$   3/14/22 Nicole Nightingale 1/1 57.27+78.67 120.85 120.85   3/17/22 Therex, manual 2/1 29.21+22.84+21.34 73.39 194.24   3/21/22 Therex 3 29.21+22.84+22.84 74.89 269.13   3/24/22 Therex 3 29.21+22.84+22.84 74.89 344.02   3//28/22 TE 3  74.89 418.91   3/31 TE 3  74.89 493.80   4/4/22 TE 3 29.21+22.84+22.84 74.89 568.69   4/7/22 TE 3  74.89 643.58   4/11/22 TE 3  74.89 718.47   4/14/22 TE 3 29.21+22.84+22.84 74.89 793.36   4/19/22 TE 3 29.21+22.84+22.84 74.89 868.25   4/21/22 TE 3  74.89 943.14

## 2022-04-25 ENCOUNTER — OFFICE VISIT (OUTPATIENT)
Dept: PAIN MANAGEMENT | Age: 72
End: 2022-04-25
Payer: MEDICARE

## 2022-04-25 ENCOUNTER — HOSPITAL ENCOUNTER (OUTPATIENT)
Dept: PHYSICAL THERAPY | Facility: CLINIC | Age: 72
Setting detail: THERAPIES SERIES
Discharge: HOME | End: 2022-04-25
Payer: MEDICARE

## 2022-04-25 VITALS — HEIGHT: 66 IN | BODY MASS INDEX: 36.19 KG/M2 | WEIGHT: 225.2 LBS

## 2022-04-25 DIAGNOSIS — M19.90 INFLAMMATORY ARTHROPATHY: Primary | ICD-10-CM

## 2022-04-25 DIAGNOSIS — M06.9 RHEUMATOID ARTHRITIS, INVOLVING UNSPECIFIED SITE, UNSPECIFIED WHETHER RHEUMATOID FACTOR PRESENT (HCC): ICD-10-CM

## 2022-04-25 DIAGNOSIS — G89.29 OTHER CHRONIC PAIN: ICD-10-CM

## 2022-04-25 DIAGNOSIS — Z79.891 ENCOUNTER FOR LONG-TERM OPIATE ANALGESIC USE: ICD-10-CM

## 2022-04-25 PROCEDURE — 1036F TOBACCO NON-USER: CPT | Performed by: PAIN MEDICINE

## 2022-04-25 PROCEDURE — 1123F ACP DISCUSS/DSCN MKR DOCD: CPT | Performed by: PAIN MEDICINE

## 2022-04-25 PROCEDURE — 4040F PNEUMOC VAC/ADMIN/RCVD: CPT | Performed by: PAIN MEDICINE

## 2022-04-25 PROCEDURE — 3017F COLORECTAL CA SCREEN DOC REV: CPT | Performed by: PAIN MEDICINE

## 2022-04-25 PROCEDURE — G8427 DOCREV CUR MEDS BY ELIG CLIN: HCPCS | Performed by: PAIN MEDICINE

## 2022-04-25 PROCEDURE — 97110 THERAPEUTIC EXERCISES: CPT

## 2022-04-25 PROCEDURE — G8417 CALC BMI ABV UP PARAM F/U: HCPCS | Performed by: PAIN MEDICINE

## 2022-04-25 PROCEDURE — 99214 OFFICE O/P EST MOD 30 MIN: CPT | Performed by: PAIN MEDICINE

## 2022-04-25 RX ORDER — OXYCODONE HYDROCHLORIDE AND ACETAMINOPHEN 5; 325 MG/1; MG/1
1 TABLET ORAL EVERY 8 HOURS PRN
Qty: 90 TABLET | Refills: 0 | Status: SHIPPED | OUTPATIENT
Start: 2022-04-25 | End: 2022-05-24 | Stop reason: SDUPTHER

## 2022-04-25 NOTE — FLOWSHEET NOTE
[] [facility]) [facility] &  Therapy  [address]  [phone]  F: [phone] [] [address]  Kl\[facility] [place] [facility]\"" 36   [address]  P: [phone]  F: [phone] [x] [address] &  Therapy  [address] [facility] [address]  P: [phone]  F: [phone] [] [address]  P: [phone]  F: [phone] [] [address]  [facility]   [address]   [place]: [phone]  F: [phone]      Physical Therapy Daily Treatment Note    Date:  2022  Patient Name:  Chevy Mendez. \"Shashank\"   :  1950  MRN: [id number]  Physician: [address] [place] [address]              Insurance: MEDICARE ([email or web address]  Medical Diagnosis: M75.102 - Unspecified rotator cuff tear or rupture of left shoulder, not specified as traumatic                    Rehab Codes: M25.512, M25.612,   Onset Date: DOS (2/15/22)                           Next 's appt. : 22  Visit# / total visits: ; Progress note for Medicare patient due at visit 20     Cancels/No Shows: 0/0    Subjective: Pt reporting with very minimal pain, stating that he has continued to have no issues since his last treatment. Pain:  [x] Yes  [] No  Location: L Shoulder   Pain Rating: (0-10 scale) 1/10  Pain altered Tx:  [x] No  [] Yes  Action:  Comments:      Objective:       Today's Treatment:  Modalities:   Precautions: General                            Exercises: Protocol in hard chart   Exercise  L RTC repair     S/P Week 9 () Reps/ Time Weight/ Level Comments    UBE  6'    x              UT S  3x30\"   Not using L arm x   Levator S  3x30\"   Not using L arm   x   Seated scap retract 2x15, 5\"        IR Towel S 3x30\"   x          Supine ABCs  1#                Wand abd 10x10\"        Wand ER 10x10\"        SL ER 2x12 3# towel x   SL flexion 2x10 1# Elbow bent    SL abd 2x12 3#  x   SL horiz abd 2x12 3#  x          Prone Row 2x10 1#     Prone I 3x10 1# (L)    Prone T x10  (L)               Wall slides 10x10\"   Flex, abd x          Dowel AAROM 2x10  Standing, jose UEs, flexion               Standing Flexion  2x12  2#   x   Standing Scaption  2x12  3#   x    Ball on wall 2x10 3.5#     x          Isometrics 10x10\"   IR, ER           T-band:       Rows 3x10, 3\" blue  x   Ext 3x10, 3\" blue  x   ER/IR walkouts x15 ea Lime     ER/IR 2x10 Blue  x   Other: Hypervolt L UT, L rhomboids, L lower trap, L lat - held       Treatment Charges: Mins Units   []  Modalities     [x]  Ther Exercise 48 3   []  Manual Therapy     []  Ther Activities     []  Aquatics     []  Vasocompression     []  Other     Total Treatment time 48 3       Assessment: [x] Progressing toward goals. Continued with tx per log above. No complaints throughout session. Increased DB resistance with all exercises completed, as well as increasing reps with t-band scapular exercises with good recall and technique throughout. Also able to add ball on wall to continue with strength progression. Pt with no complaints during today's treatment, reporting no pain with progressions. Pt reports fatigue with progressions at the end of today's treatment. Continue progressing strength and pt tolerates. [] No change. [] Other:  [x] Patient would continue to benefit from skilled physical therapy services in order to: Progress L UE strength, ROM, and functional use following RTC repair to improve functional use of shoulder    STG: (to be met in 10 treatments)  1. ? Pain: Pt to decrease pain levels to 4/10 with all functional use of L arm, helping to improve tolerance to use with activity.: MET (4/14/22)  2. ? ROM: Increase flexibility throughout L UE to help improve functional ROM of L UE grossly.: ONGOING (4/14/22)  3.  Independent with Home Exercise Programs: MET (4/14/22)     LTG: (to be met in 20 treatments)  1. Improve score of UEFS from 57% impaired to <30% impaired, demonstrating improve functional use of L arm.  2. Improve L UE strength to 4+/5, reducing need for compensations with use of UE with ADLs. 3. Pt will demonstrate L shoulder AROM equal to R in order to reduce need for compensations with functional use. 4. Decrease pain to 0/10     Patient goals: \"Play golf soon\"    Pt. Education:  [x] Yes  [] No  [] Reviewed Prior HEP/Ed  Method of Education: [x] Verbal  [x] Demo  [] Written  Comprehension of Education:  [x] Verbalizes understanding. [x] Demonstrates understanding. [x] Needs review. [] Demonstrates/verbalizes HEP/Ed previously given. Plan: [x] Continue current frequency toward long and short term goals. [x] Specific Instructions for subsequent treatments: Continue tx per POC to progress strength and ROM      Time In: 4814           Time Out: 8773    Electronically signed by:   Shalini Salas PT

## 2022-04-25 NOTE — PRE-CERTIFICATION NOTE
Medicare Cap   [x] Physical Therapy  [] Speech Therapy  [] Occupational therapy  *PT and Speech caps combine      $2150 Limit for PT and Speech combined  $2150 Limit for OT individually  At the beginning of the month where you expect to go over $2150, please add the [address] [place] [address] modifier      Patient Name: Wesley Junior: 1950    Note:  This is an estimate of charges billed.      Date of [address] Name # units/ charge $$$ charge Daily Total Charge Ongoing Total $$$   3/14/22 Edward Hess 1/1 37.44+92.54 120.85 120.85   3/17/22 Therex, manual 2/1 29.21+22.84+21.34 73.39 194.24   3/21/22 Therex 3 29.21+22.84+22.84 74.89 269.13   3/24/22 Therex 3 29.21+22.84+22.84 74.89 344.02   3//28/22 TE 3  74.89 418.91   3/31 TE 3  74.89 493.80   4/4/22 TE 3 29.21+22.84+22.84 74.89 568.69   4/7/22 TE 3  74.89 643.58   4/11/22 TE 3  74.89 718.47   4/14/22 TE 3 29.21+22.84+22.84 74.89 793.36   4/19/22 TE 3 29.21+22.84+22.84 74.89 868.25   4/21/22 TE 3  74.89 943.14   4/25/22 TE 3 29.21+22.84+22.84 74.89 1,018.03

## 2022-04-25 NOTE — PROGRESS NOTES
HPI:     Shoulder Pain   The pain is present in the neck, back, left shoulder, left arm, left elbow, left wrist, left hand, right hand, right arm, right elbow and right wrist. This is a chronic problem. The current episode started more than 1 month ago. There has been no history of extremity trauma. The problem occurs intermittently. The problem has been gradually improving. The quality of the pain is described as aching and sharp. The pain is at a severity of 4/10. The pain is moderate. Associated symptoms include an inability to bear weight, itching, joint locking, joint swelling, a limited range of motion, numbness, stiffness and tingling. Pertinent negatives include no fever. The symptoms are aggravated by activity, contact and standing. He has tried acetaminophen, cold, heat, movement, OTC pain meds, OTC ointments, rest, oral narcotics and NSAIDS for the symptoms. The treatment provided moderate relief. Family history includes gout. There is no history of diabetes, gout, osteoarthritis or rheumatoid arthritis. Diffuse joint pain complaints. History of rheumatoid arthritis. Has been on Percocet 3 times a day for some time. Has been stable and compliant. Urine drug screen appropriate. Has had surgery on both shoulders. Has had left knee replaced. Is on methotrexate for rheumatoid arthritis. Patient denies any new neurological symptoms. Nobowel or bladder incontinence, no weakness, and no falling. Review of OARRS does not show any aberrant prescription behavior. Medication is helping the patient stay active. Patient denies any side effects and reports adequate analgesia. No sign of misuse/abuse.     Past Medical History:   Diagnosis Date    Calcium pyrophosphate deposition disease     Diverticulosis of colon     Hypertension     Osteoarthritis        Past Surgical History:   Procedure Laterality Date    COLONOSCOPY  11/26/2008    diverticulosis    COLONOSCOPY  12/29/2017 Diverticulosis    HERNIA REPAIR      PILONIDAL CYST EXCISION      MA COLON CA SCRN NOT  [address] IND N/A 12/29/2017    COLONOSCOPY performed by Alvaro Martinez MD at [address] Right 06/2020    TOTAL KNEE ARTHROPLASTY Left 1/19/2016    [place]/Dr. Mac       No Known Allergies      Current Outpatient Medications:     oxyCODONE-acetaminophen (PERCOCET) 5-325 MG per tablet, Take 1 tablet by mouth every 8 hours as needed for Pain for up to 30 days. Take lowest dose possible to manage pain, Disp: 90 tablet, Rfl: 0    metoprolol tartrate (LOPRESSOR) 25 MG tablet, Take one tab by mouth bid, Disp: 180 tablet, Rfl: 1    atorvastatin (LIPITOR) 20 MG tablet, TAKE 1 TABLET BY MOUTH EVERY DAY, Disp: 90 tablet, Rfl: 1    ELDERBERRY PO, Take 2 tablets by mouth daily, Disp: , Rfl:     Multiple Vitamins-Minerals (PRESERVISION/LUTEIN PO), Take 2 capsules by mouth daily, Disp: , Rfl:     Multiple Vitamins-Minerals (THERAGRAN-M PO), Take 1 tablet by mouth daily, Disp: , Rfl:     enalapril (VASOTEC) 10 MG tablet, TAKE 1 TABLET BY MOUTH EVERY DAY, Disp: 90 tablet, Rfl: 0    amLODIPine (NORVASC) 10 MG tablet, TAKE 1 TABLET BY MOUTH EVERY DAY, Disp: 90 tablet, Rfl: 0    folic acid (FOLVITE) 1 MG tablet, daily, Disp: , Rfl:     methotrexate (RHEUMATREX) 2.5 MG chemo tablet, Take 2.5 mg by mouth 10 tablets weekly, Disp: , Rfl:     meloxicam (MOBIC) 15 MG tablet, Take by mouth daily, Disp: , Rfl: 1    oxyCODONE-acetaminophen (PERCOCET) 5-325 MG per tablet, Take by mouth every 4 hours as needed, Disp: , Rfl: 0    Multiple Vitamins-Minerals (MULTIVITAMIN PO), Take  by mouth., Disp: , Rfl:     CALCIUM PO, Take  by mouth., Disp: , Rfl:     History reviewed. No pertinent family history.     Social History     Socioeconomic History    Marital status:      Spouse name: Not on file    Number of children: Not on file    Years of education: Not on file    Highest education level: Not on file Occupational History    Not on file   Tobacco Use    Smoking status: Former Smoker     Packs/day: 1.50     Years: 25.00     Pack years: 37.50     Quit date: 1980     Years since quittin.3    Smokeless tobacco: Never Used    Tobacco comment: estimated quit date   Substance and Sexual Activity    Alcohol use: No    Drug use: No    Sexual activity: Not on file   Other Topics Concern    Not on file   Social History Narrative    Not on file     Social Determinants of Health     Financial Resource Strain: Low Risk     Difficulty of Paying Living Expenses: Not hard at all   Food Insecurity: No Food Insecurity    Worried About [address] in the Last Year: Never true    [address] [facility] [address] in the Last Year: Never true   Transportation Needs: No Transportation Needs    Lack of Transportation (Medical): No    Lack of Transportation (Non-Medical): No   Physical Activity: Inactive    Days of Exercise per Week: 0 days    Minutes of Exercise per Session: 0 min   Stress:     Feeling of Stress : Not on file   Social Connections:     Frequency of Communication with Friends and Family: Not on file    Frequency of Social Gatherings with Friends and Family: Not on file    Attends [employer] Services: Not on file    Active Member of Clubs or Organizations: Not on file    Attends Club or Organization Meetings: Not on file    Marital Status: Not on file   Intimate Partner Violence: Not At Risk    Fear of Current or Ex-Partner: No    Emotionally Abused: No    Physically Abused: No    Sexually Abused: No   Housing Stability:     Unable to Pay for Housing in the Last Year: Not on file    Number of [place] in the Last Year: Not on file    Unstable Housing in the Last Year: Not on file       Review of Systems:  Review of Systems   Constitutional: Negative for fever. Skin: Positive for itching. Musculoskeletal: Positive for stiffness. Negative for gout.    Neurological: Positive for numbness and tingling. Physical Exam:      Physical Exam  Constitutional:       Appearance: Normal appearance. Pulmonary:      Effort: Pulmonary effort is normal.   Neurological:      Mental Status: He is alert. Psychiatric:         Attention and Perception: Attention and perception normal.         Mood and Affect: Mood and affect normal.         Record/Diagnostics Review:    As above, I did review the imaging    Orders:    Orders Placed This Encounter   Medications    oxyCODONE-acetaminophen (PERCOCET) 5-325 MG per tablet     Sig: Take 1 tablet by mouth every 8 hours as needed for Pain for up to 30 days. Take lowest dose possible to manage pain     Dispense:  90 tablet     Refill:  0     Reduce doses taken as pain becomes manageable       Assessment:  1. Inflammatory arthropathy    2. Encounter for long-term opiate analgesic use    3. Other chronic pain    4. Rheumatoid arthritis, involving unspecified site, unspecified whether rheumatoid factor present [facility])        Treatment Plan:  DISCUSSION: Treatment options discussed with patient and all questions answered to patient's satisfaction. OARRS Review: Reviewed and acceptable for medications prescribed. TREATMENT OPTIONS:     Discussed different treatment options including continued conservative care such as physical therapy, chiropractic care, acupuncture. Discussed different interventional options such as epidural steroids or medial branch blocks. Also discussed neuromodulation in the form of spinal cord stimulation. Also discussed surgical evaluation. Due to the high risk nature of this patient's pain medication close monitoring is required. Medication risk and benefits discussed. Pain contract    Percocet prn, MED = 22.5    Continue rheumatology sirisha Huddleston M.D.         I have reviewed the chief complaint and history of present illness (including ROS and PFSH) and vital documentation by my staff and I agree with their documentation and have added where applicable.

## 2022-04-28 ENCOUNTER — HOSPITAL ENCOUNTER (OUTPATIENT)
Dept: PHYSICAL THERAPY | Facility: CLINIC | Age: 72
Setting detail: THERAPIES SERIES
Discharge: HOME | End: 2022-04-28
Payer: MEDICARE

## 2022-04-28 PROCEDURE — 97110 THERAPEUTIC EXERCISES: CPT

## 2022-04-28 NOTE — FLOWSHEET NOTE
[] [facility]) - [facility] &  Therapy  [address]  [phone]  F: [phone] [] [address]  [facility]   [address]  P: [phone]  F: [phone] [x] [address] &  Therapy  [address]  P: [phone]  F: [phone] [] [email or web address]  P: [phone]  F: [phone] [] [address]  [facility]   [address]   [place]: [phone]  F: [phone]      Physical Therapy Daily Treatment Note    Date:  2022  Patient Name:  True Backers. \"Shashank\"   :  1950  MRN: [id number]  Physician: [address] [place] [address]              Insurance: MEDICARE ([employer]  Medical Diagnosis: M75.102 - Unspecified rotator cuff tear or rupture of left shoulder, not specified as traumatic                    Rehab Codes: M25.512, M25.612,   Onset Date: DOS (2/15/22)                           Next 's appt. : 22  Visit# / total visits: ; Progress note for Medicare patient due at visit 20     Cancels/No Shows: 0/0    Subjective: Pt arrived stating no pain at rest however slight onset once he begins moving the arm. Denies any soreness after strengthening progressions last session. Pain:  [x] Yes  [] No  Location: L Shoulder   Pain Rating: (0-10 scale) 1/10  Pain altered Tx:  [x] No  [] Yes  Action:  Comments:      Objective:       Today's Treatment:  Modalities:   Precautions: General                            Exercises: Protocol in hard chart   Exercise  L RTC repair     S/P Week 10 () Reps/ Time Weight/ Level Comments    UBE  6'    x              UT S  3x30\"   Not using L arm x   Levator S  3x30\"   Not using L arm   x   Seated scap retract 2x15, 5\"        IR Towel S 3x30\"   x          Supine ABCs  1#                Wand abd 10x10\"        Wand ER 10x10\"        SL ER 25x 3# towel x   SL flexion 2x10 1# Elbow bent    SL abd 25x 3#  x   SL horiz abd 25x 3#  x          Prone Row 2x10 1#     Prone I 3x10 1# (L)    Prone T x10  (L)               Wall slides 10x10\"   Flex, abd x          Dowel AAROM 2x10  Standing, jose UEs, flexion               Standing Flexion  2x12  3#   x   Standing Scaption  2x12  3#   x   Standing Abduction 2x12  3#  x   Ball on wall 2x10 3.5#     x          Isometrics 10x10\"   IR, ER           T-band:       Rows 3x10, 3\" blue  x   Ext 3x10, 3\" blue  x   TB HAB 3x10 blue  x   ER/IR walkouts x15 ea Lime     ER/IR 3x10 Blue  x   Other: Hypervolt L UT, L rhomboids, L lower trap, L lat - held       Treatment Charges: Mins Units   []  Modalities     [x]  Ther Exercise 45 3   []  Manual Therapy     []  Ther Activities     []  Aquatics     []  Vasocompression     []  Other     Total Treatment time 45 3       Assessment: [x] Progressing toward goals. Continued with strengthening program as detailed above. Added standing weighted abduction and TB HAB to progress scapular strengthening with min vc's required to reduce UT compensations. Increased reps for ER/IR with no complaints of discomfort. Pt fatigued post session however denies any increase in sx. Pt to follow up with physician next week. Will continue to progress as tolerated. [] No change. [] Other:  [x] Patient would continue to benefit from skilled physical therapy services in order to: Progress L UE strength, ROM, and functional use following RTC repair to improve functional use of shoulder    STG: (to be met in 10 treatments)  1. ? Pain: Pt to decrease pain levels to 4/10 with all functional use of L arm, helping to improve tolerance to use with activity.: MET (4/14/22)  2. ? ROM: Increase flexibility throughout L UE to help improve functional ROM of L UE grossly.: ONGOING (4/14/22)  3.  Independent with Home Exercise Programs: MET (4/14/22)     LTG: (to be

## 2022-04-28 NOTE — PRE-CERTIFICATION NOTE
Medicare Cap   [x] Physical Therapy  [] Speech Therapy  [] Occupational therapy  *PT and Speech caps combine      $2150 Limit for PT and Speech combined  $2150 Limit for OT individually  At the beginning of the month where you expect to go over $2150, please add the [address] [place] modifier      Patient Name: Charito Elena: 1950    Note:  This is an estimate of charges billed.      Date of [address] Name # units/ charge $$$ charge Daily Total Charge Ongoing Total $$$   3/14/22 Daniel Givens 1/1 03.43+64.27 120.85 120.85   3/17/22 Therex, manual 2/1 29.21+22.84+21.34 73.39 194.24   3/21/22 Therex 3 29.21+22.84+22.84 74.89 269.13   3/24/22 Therex 3 29.21+22.84+22.84 74.89 344.02   3//28/22 TE 3  74.89 418.91   3/31 TE 3  74.89 493.80   4/4/22 TE 3 29.21+22.84+22.84 74.89 568.69   4/7/22 TE 3  74.89 643.58   4/11/22 TE 3  74.89 718.47   4/14/22 TE 3 29.21+22.84+22.84 74.89 793.36   4/19/22 TE 3 29.21+22.84+22.84 74.89 868.25   4/21/22 TE 3  74.89 943.14   4/25/22 TE 3 29.21+22.84+22.84 74.89 1,018.03   4/28/22 TE 3 29.21+22.84+22.84 74.89 1092.92

## 2022-05-02 ENCOUNTER — HOSPITAL ENCOUNTER (OUTPATIENT)
Dept: PHYSICAL THERAPY | Facility: CLINIC | Age: 72
Setting detail: THERAPIES SERIES
Discharge: HOME | End: 2022-05-02
Payer: MEDICARE

## 2022-05-02 PROCEDURE — 97110 THERAPEUTIC EXERCISES: CPT

## 2022-05-02 NOTE — PRE-CERTIFICATION NOTE
Medicare Cap   [x] Physical Therapy  [] Speech Therapy  [] Occupational therapy  *PT and Speech caps combine      $2150 Limit for PT and Speech combined  $2150 Limit for OT individually  At the beginning of the month where you expect to go over $2150, please add the [address] [place] modifier      Patient Name: Johanna Fletcher: 1950    Note:  This is an estimate of charges billed.      Date of [address] Name # units/ charge $$$ charge Daily Total Charge Ongoing Total $$$   3/14/22 David Allen 1/1 01.53+14.17 120.85 120.85   3/17/22 Therex, manual 2/1 29.21+22.84+21.34 73.39 194.24   3/21/22 Therex 3 29.21+22.84+22.84 74.89 269.13   3/24/22 Therex 3 29.21+22.84+22.84 74.89 344.02   3//28/22 TE 3  74.89 418.91   3/31 TE 3  74.89 493.80   4/4/22 TE 3 29.21+22.84+22.84 74.89 568.69   4/7/22 TE 3  74.89 643.58   4/11/22 TE 3  74.89 718.47   4/14/22 TE 3 29.21+22.84+22.84 74.89 793.36   4/19/22 TE 3 29.21+22.84+22.84 74.89 868.25   4/21/22 TE 3  74.89 943.14   4/25/22 TE 3 29.21+22.84+22.84 74.89 1,018.03   4/28/22 TE 3 29.21+22.84+22.84 74.89 1092.92   5/2/22 TE 3  74.89 1167.81

## 2022-05-02 NOTE — FLOWSHEET NOTE
[] [facility]) - [facility] &  Therapy  [address]  [phone]  F: [phone] [] [address]  [facility]   [address]  P: [phone]  F: [phone] [x] [address] &  Therapy  [address] [facility]  P: [phone]  F: [phone] [] [address]  P: [phone]  F: [phone] [] [address]  [facility]   [address]   [place]: [phone]  F: [phone]      Physical Therapy Daily Treatment Note    Date:  2022  Patient Name:  Jesús Kim. \"Shashank\"   :  1950  MRN: [id number]  Physician: [address] [place] [address]              Insurance: MEDICARE (Med. Nec.)  Medical Diagnosis: M75.102 - Unspecified rotator cuff tear or rupture of left shoulder, not specified as traumatic                    Rehab Codes: M25.512, M25.612,   Onset Date: DOS (2/15/22)                           Next 's appt. : 22  Visit# / total visits: 15/20; Progress note for Medicare patient due at visit 20     Cancels/No Shows: 0/0    Subjective: No pain on arrival, feeling pretty good. Had some pain randomly over the weekend but tolerable. Wakes up about once a night with pain but it goes away. Pain:  [] Yes  [x] No  Location: L Shoulder   Pain Rating: (0-10 scale) 0/10  Pain altered Tx:  [x] No  [] Yes  Action:  Comments:      Objective:     Today's Treatment:  Modalities:   Precautions: General                            Exercises: Protocol in hard chart   Exercise  L RTC repair     S/P Week 10 () Reps/ Time Weight/ Level Comments    UBE  6'    x              UT S  3x30\"   Not using L arm x   Levator S  3x30\"   Not using L arm   x   IR Towel S 3x30\"   x          Supine ABCs  1#                Wand abd 10x10\"        Wand ER 10x10\" SL ER 25x 3# towel x   SL abd 25x 3#  x   SL horiz abd 25x 3#  x          Prone Row 2x10 1#     Prone I 3x10 1# (L)    Prone T x10  (L)               Wall slides 10x10\"   Flex, abd x          Standing Flexion  2x15  3#   x   Standing Scaption  2x15  3#   x   Standing Abduction 2x15  3#  x   Ball on wall 2x15 3.5#     x          T-band:       Rows 3x10, 3\" blue  x   Ext 3x10, 3\" blue  x   TB HAB 3x10 blue  x   ER/IR 3x10 Blue  x   Other: Hypervolt L UT, L rhomboids, L lower trap, L lat - held       Treatment Charges: Mins Units   []  Modalities     [x]  Ther Exercise 45 3   []  Manual Therapy     []  Ther Activities     []  Aquatics     []  Vasocompression     []  Other     Total Treatment time 45 3       Assessment: [x] Progressing toward goals. Initiated session on UBE followed by stretches and strengthening. Verbal and tactile cuing throughout to prevent compensation and for scapular positioning upon UE elevation and postural improvement during stretches. Patient progressing as expected with no complaints of increased pain, only fatigue. Patient is eager to golf, scheduled to see physician in 2 days. [] No change. [] Other:  [x] Patient would continue to benefit from skilled physical therapy services in order to: Progress L UE strength, ROM, and functional use following RTC repair to improve functional use of shoulder    STG: (to be met in 10 treatments)  1. ? Pain: Pt to decrease pain levels to 4/10 with all functional use of L arm, helping to improve tolerance to use with activity.: MET (4/14/22)  2. ? ROM: Increase flexibility throughout L UE to help improve functional ROM of L UE grossly.: ONGOING (4/14/22)  3. Independent with Home Exercise Programs: MET (4/14/22)     LTG: (to be met in 20 treatments)  1.  Improve score of UEFS from 57% impaired to <30% impaired, demonstrating improve functional use of L arm.  2. Improve L UE strength to 4+/5, reducing need for compensations with use of UE with ADLs.  3. Pt will demonstrate L shoulder AROM equal to R in order to reduce need for compensations with functional use. 4. Decrease pain to 0/10     Patient goals: \"Play golf soon\"    Pt. Education:  [x] Yes  [] No  [] Reviewed Prior HEP/Ed  Method of Education: [x] Verbal  [x] Demo  [] Written  Comprehension of Education:  [x] Verbalizes understanding. [x] Demonstrates understanding. [] Needs review. [] Demonstrates/verbalizes HEP/Ed previously given. Plan: [x] Continue current frequency toward long and short term goals.      [x] Specific Instructions for subsequent treatments: Continue tx per POC to progress strength and ROM      Time In: 09:26 AM           Time Out: 10:15 AM    Electronically signed by:  Reno Louie PTA

## 2022-05-04 RX ORDER — AMLODIPINE BESYLATE 10 MG/1
TABLET ORAL
Qty: 90 TABLET | Refills: 0 | OUTPATIENT
Start: 2022-05-04

## 2022-05-04 NOTE — TELEPHONE ENCOUNTER
Jerson Dickson is calling to request a refill on the following medication(s):    Medication Request:  Requested Prescriptions     Pending Prescriptions Disp Refills    amLODIPine (NORVASC) 10 MG tablet [Pharmacy Med Name: amLODIPine Besylate Oral Tablet 10 MG] 90 tablet 0     Sig: TAKE 1 TABLET BY MOUTH EVERY DAY       Last Visit Date (If Applicable):      Next Visit Date:    Visit date not found

## 2022-05-05 ENCOUNTER — HOSPITAL ENCOUNTER (OUTPATIENT)
Dept: PHYSICAL THERAPY | Facility: CLINIC | Age: 72
Setting detail: THERAPIES SERIES
Discharge: HOME | End: 2022-05-05
Payer: MEDICARE

## 2022-05-05 PROCEDURE — 97110 THERAPEUTIC EXERCISES: CPT

## 2022-05-05 NOTE — PRE-CERTIFICATION NOTE
Medicare Cap   [x] Physical Therapy  [] Speech Therapy  [] Occupational therapy  *PT and Speech caps combine      $2150 Limit for PT and Speech combined  $2150 Limit for OT individually  At the beginning of the month where you expect to go over $2150, please add the [address] modifier      Patient Name: Ana Whipple: 1950    Note:  This is an estimate of charges billed.      Date of [address] Name # units/ charge $$$ charge Daily Total Charge Ongoing Total $$$   3/14/22 Cristhian Marcelinohty 1/1 61.43+62.60 120.85 120.85   3/17/22 Therex, manual 2/1 29.21+22.84+21.34 73.39 194.24   3/21/22 Therex 3 29.21+22.84+22.84 74.89 269.13   3/24/22 Therex 3 29.21+22.84+22.84 74.89 344.02   3//28/22 TE 3  74.89 418.91   3/31 TE 3  74.89 493.80   4/4/22 TE 3 29.21+22.84+22.84 74.89 568.69   4/7/22 TE 3  74.89 643.58   4/11/22 TE 3  74.89 718.47   4/14/22 TE 3 29.21+22.84+22.84 74.89 793.36   4/19/22 TE 3 29.21+22.84+22.84 74.89 868.25   4/21/22 TE 3  74.89 943.14   4/25/22 TE 3 29.21+22.84+22.84 74.89 1,018.03   4/28/22 TE 3 29.21+22.84+22.84 74.89 1092.92   5/2/22 TE 3  74.89 1167.81   5/5/22 TE 3  74.89 1242.70

## 2022-05-05 NOTE — DISCHARGE SUMMARY
[] [facility]) [facility] &  Therapy  [address]  [phone]  F: [phone] [] [address]  [facility]   [address]  P: [phone]  F: [phone] [x] [address] &  Therapy  [address]  P: [phone]  F: [phone] [] [address]  P: [phone]  F: [phone] [] [address]  [facility]   [address]   [place]: [phone]  F: [phone]      Physical Therapy Daily Treatment Note/Discharge Note    Date:  2022  Patient Name:  Yani Cano. \"Shashank\"   :  1950  MRN: [id number]  Physician: [address] [place] [address]              Insurance: MEDICARE (Med. Nec.)  Medical Diagnosis: M75.102 - Unspecified rotator cuff tear or rupture of left shoulder, not specified as traumatic                    Rehab Codes: M25.512, M25.612,   Onset Date: DOS (2/15/22)                           Next 's appt. : 22  Visit# / total visits: ; Progress note for Medicare patient due at visit 20     Cancels/No Shows: 0/0    Subjective: Pt continued to deny any pain upon arrival, states that his shoulder continues to do well. Pt had f/u with physician yesterday and states that he was told he no longer needs PT, so plan to DC at this time. Pain:  [] Yes  [x] No  Location: L Shoulder   Pain Rating: (0-10 scale) 0/10  Pain altered Tx:  [x] No  [] Yes  Action:  Comments:      Objective:     Today's Treatment:  Modalities:   Precautions: General                            Exercises: Protocol in hard chart   Exercise  L RTC repair     S/P Week 10 () Reps/ Time Weight/ Level Comments    UBE  6'    x              UT S  3x30\"   Not using L arm x   Levator S  3x30\"   Not using L arm   x   IR Towel S 3x30\"   x          Supine ABCs  1#                Wand abd 10x10\"        Wand ER 10x10\"        SL ER 3x12 3# towel x   SL abd 25x 3#  x   SL horiz abd 25x 3#  x          Prone Row 2x10 1#     Prone I 3x10 1# (L)    Prone T x10  (L)               Wall slides 10x10\"   Flex, abd x          Standing Flexion  3x12  3#   x   Standing Scaption  3x12  3#   x   Standing Abduction 3x12  3#  x   Ball on wall 2x15 3.5#               Biceps curls 3x10 3# Supinated, neutral x                        T-band:       Rows 3x10, 3\" Purple  x   Ext 3x10, 3\" blue     TB HAB 3x10 blue     ER/IR 3x10 Purple  x   Other: Hypervolt L UT, L rhomboids, L lower trap, L lat - held        ROM            A/P ROM A/P STRENGTH  STRENGTH    LEFT RIGHT LEFT RIGHT   SEATED SHLD FLEX   XXXXXXXX XXXXXXXXX   SEATED SHD ABD   XXXXXXXX XXXXXXXXX   SEATED IR   XXXXXXXX XXXXXXXX          SHLD FLEX 150  4+/5    SHLD   4+/5    SHLD ER T3  4+/5    SHLD IR T9  4+/5    SUPRASPINATUS              ELBOW FLEX       ELBOW EXT. Treatment Charges: Mins Units   []  Modalities     [x]  Ther Exercise 40 3   []  Manual Therapy     []  Ther Activities     []  Aquatics     []  Vasocompression     []  Other     Total Treatment time 40 3       Assessment: [x] Progressing toward goals. Spent today's session going over HEP to ensure proper completion and technique, with no issues seen or reported. Since starting therapy, pt reports that he saw really good progress in his shoulder. Per pt, he notes that he saw really good progress in his strength, his ROM, and his functional use of his arm with ADLs. Currently, pt states that he has not noticed anything that he is still struggling or limited with at this time. Pt states that he was told that he can be DC from PT by surgeon, and notes that he would like to DC at this time. Recommended that pt continue with HEP independently to keep working on progressing shoulder strength with pt agreeing to plan.  Pt to be DC to HEP at this time.    [] No change. [] Other:  [] Patient would continue to benefit from skilled physical therapy services in order to: Progress L UE strength, ROM, and functional use following RTC repair to improve functional use of shoulder    STG: (to be met in 10 treatments)  1. ? Pain: Pt to decrease pain levels to 4/10 with all functional use of L arm, helping to improve tolerance to use with activity.: MET (4/14/22)  2. ? ROM: Increase flexibility throughout L UE to help improve functional ROM of L UE grossly.: MET (5/5/22)  3. Independent with Home Exercise Programs: MET (4/14/22)     LTG: (to be met in 20 treatments)  1. Improve score of UEFS from 57% impaired to <30% impaired, demonstrating improve functional use of L arm.: MET, pt scored 5% impaired (5/5/22)  2. Improve L UE strength to 4+/5, reducing need for compensations with use of UE with ADLs. : MET (5/5/22)  3. Pt will demonstrate L shoulder AROM equal to R in order to reduce need for compensations with functional use.: MET, although still slightly limited in flexion (5/5/22)  4. Decrease pain to 0/10: NOT MET, still reports can get up to 4-5/10 at night, but only lasts a short duration (5/5/22)      Patient goals: \"Play golf soon\"    Pt. Education:  [x] Yes  [] No  [x] Reviewed Prior HEP/Ed  Method of Education: [x] Verbal  [x] Demo  [] Written  Comprehension of Education:  [x] Verbalizes understanding. [x] Demonstrates understanding. [] Needs review. [x] Demonstrates/verbalizes HEP/Ed previously given. Plan: [] Continue current frequency toward long and short term goals. [x] Specific Instructions for subsequent treatments: Pt to be DC at this time      Time In: 0360           Time Out: 8573    Electronically signed by:   Atul Kim PT

## 2022-05-09 ENCOUNTER — APPOINTMENT (OUTPATIENT)
Dept: PHYSICAL THERAPY | Facility: CLINIC | Age: 72
End: 2022-05-09
Payer: MEDICARE

## 2022-05-09 RX ORDER — ENALAPRIL MALEATE 10 MG/1
TABLET ORAL
Qty: 90 TABLET | Refills: 0 | OUTPATIENT
Start: 2022-05-09

## 2022-05-09 RX ORDER — AMLODIPINE BESYLATE 10 MG/1
TABLET ORAL
Qty: 90 TABLET | Refills: 1 | Status: SHIPPED | OUTPATIENT
Start: 2022-05-09

## 2022-05-10 RX ORDER — ENALAPRIL MALEATE 10 MG/1
TABLET ORAL
Qty: 90 TABLET | Refills: 0 | OUTPATIENT
Start: 2022-05-10

## 2022-05-12 ENCOUNTER — APPOINTMENT (OUTPATIENT)
Dept: PHYSICAL THERAPY | Facility: CLINIC | Age: 72
End: 2022-05-12
Payer: MEDICARE

## 2022-05-23 RX ORDER — ENALAPRIL MALEATE 10 MG/1
TABLET ORAL
Qty: 90 TABLET | Refills: 1 | Status: SHIPPED | OUTPATIENT
Start: 2022-05-23

## 2022-05-24 ENCOUNTER — OFFICE VISIT (OUTPATIENT)
Dept: PAIN MANAGEMENT | Age: 72
End: 2022-05-24
Payer: MEDICARE

## 2022-05-24 VITALS — WEIGHT: 223.8 LBS | BODY MASS INDEX: 35.97 KG/M2 | HEIGHT: 66 IN

## 2022-05-24 DIAGNOSIS — Z79.891 ENCOUNTER FOR LONG-TERM OPIATE ANALGESIC USE: ICD-10-CM

## 2022-05-24 DIAGNOSIS — G89.29 OTHER CHRONIC PAIN: ICD-10-CM

## 2022-05-24 DIAGNOSIS — M19.90 INFLAMMATORY ARTHROPATHY: ICD-10-CM

## 2022-05-24 DIAGNOSIS — M06.9 RHEUMATOID ARTHRITIS, INVOLVING UNSPECIFIED SITE, UNSPECIFIED WHETHER RHEUMATOID FACTOR PRESENT (HCC): ICD-10-CM

## 2022-05-24 PROCEDURE — 99213 OFFICE O/P EST LOW 20 MIN: CPT | Performed by: NURSE PRACTITIONER

## 2022-05-24 RX ORDER — OXYCODONE HYDROCHLORIDE AND ACETAMINOPHEN 5; 325 MG/1; MG/1
1 TABLET ORAL EVERY 8 HOURS PRN
Qty: 90 TABLET | Refills: 0 | Status: SHIPPED | OUTPATIENT
Start: 2022-05-25 | End: 2022-06-21 | Stop reason: SDUPTHER

## 2022-05-24 ASSESSMENT — ENCOUNTER SYMPTOMS
COUGH: 0
CONSTIPATION: 0
SHORTNESS OF BREATH: 0

## 2022-05-24 NOTE — PROGRESS NOTES
Chief Complaint   Patient presents with    Shoulder Pain    Medication Refill         [facility]  Patient complains of diffuse joint pain. Hx of rheumatoid arthritis. He follows with rheumatology and is on methotrexate. He takes percocet 5/325 TID for pain. He has been stable and compliant on this dose for many years. He has had surgery on both shoulders and left knee. Shoulder Pain   The pain is present in the left shoulder, right hand and left hand. This is a chronic problem. The current episode started more than 1 year ago. There has been no history of extremity trauma. The problem occurs intermittently. The problem has been gradually improving. The quality of the pain is described as aching and pounding. The pain is at a severity of 6/10. The pain is moderate. Associated symptoms include joint locking, a limited range of motion, numbness and tingling. Pertinent negatives include no fever, inability to bear weight, itching, joint swelling or stiffness. The symptoms are aggravated by activity and lying down. He has tried cold, heat, acetaminophen, movement, OTC ointments, OTC pain meds, oral narcotics, NSAIDS and rest for the symptoms. The treatment provided moderate relief. Family history includes gout and rheumatoid arthritis. His past medical history is significant for osteoarthritis and rheumatoid arthritis. There is no history of diabetes or gout. Patient denies any new neurological symptoms. No bowel or bladder incontinence, no weakness, and no falling. Pill count: appropriate due 5/25    Morphine equivalent: 22.5    Controlled Substance Monitoring:    Acute and Chronic Pain Monitoring:   RX Monitoring 5/24/2022   Attestation -   Periodic Controlled Substance Monitoring Possible medication side effects, risk of tolerance/dependence & alternative treatments discussed. ;No signs of potential drug abuse or diversion identified.;Obtaining appropriate analgesic effect of treatment.          Past Medical History:   Diagnosis Date    Calcium pyrophosphate deposition disease     Diverticulosis of colon     Hypertension     Osteoarthritis        Past Surgical History:   Procedure Laterality Date    COLONOSCOPY  11/26/2008    diverticulosis    COLONOSCOPY  12/29/2017    Diverticulosis    HERNIA REPAIR      PILONIDAL CYST EXCISION      OH COLON CA SCRN NOT  [address]  IND N/A 12/29/2017    COLONOSCOPY performed by Jose Roberto Galvan MD at [facility] Right 06/2020    TOTAL KNEE ARTHROPLASTY Left 1/19/2016    [place]/Dr. Mac       No Known Allergies      Current Outpatient Medications:     enalapril (VASOTEC) 10 MG tablet, TAKE 1 TABLET BY MOUTH EVERY DAY, Disp: 90 tablet, Rfl: 1    amLODIPine (NORVASC) 10 MG tablet, TAKE 1 TABLET BY MOUTH EVERY DAY, Disp: 90 tablet, Rfl: 1    oxyCODONE-acetaminophen (PERCOCET) 5-325 MG per tablet, Take 1 tablet by mouth every 8 hours as needed for Pain for up to 30 days.  Take lowest dose possible to manage pain, Disp: 90 tablet, Rfl: 0    metoprolol tartrate (LOPRESSOR) 25 MG tablet, Take one tab by mouth bid, Disp: 180 tablet, Rfl: 1    atorvastatin (LIPITOR) 20 MG tablet, TAKE 1 TABLET BY MOUTH EVERY DAY, Disp: 90 tablet, Rfl: 1    ELDERBERRY PO, Take 2 tablets by mouth daily, Disp: , Rfl:     Multiple Vitamins-Minerals (PRESERVISION/LUTEIN PO), Take 2 capsules by mouth daily, Disp: , Rfl:     Multiple Vitamins-Minerals (THERAGRAN-M PO), Take 1 tablet by mouth daily, Disp: , Rfl:     folic acid (FOLVITE) 1 MG tablet, daily, Disp: , Rfl:     methotrexate (RHEUMATREX) 2.5 MG chemo tablet, Take 2.5 mg by mouth 10 tablets weekly, Disp: , Rfl:     meloxicam (MOBIC) 15 MG tablet, Take by mouth daily, Disp: , Rfl: 1    oxyCODONE-acetaminophen (PERCOCET) 5-325 MG per tablet, Take by mouth every 4 hours as needed, Disp: , Rfl: 0    Multiple Vitamins-Minerals (MULTIVITAMIN PO), Take  by mouth., Disp: , Rfl:     CALCIUM PO, Take  by mouth., Disp: , Rfl:     History reviewed. No pertinent family history. Social History     Socioeconomic History    Marital status:      Spouse name: Not on file    Number of children: Not on file    Years of education: Not on file    Highest education level: Not on file   Occupational History    Not on file   Tobacco Use    Smoking status: Former Smoker     Packs/day: 1.50     Years: 25.00     Pack years: 45.53     Quit date: 1980     Years since quittin.4    Smokeless tobacco: Never Used    Tobacco comment: estimated quit date   Substance and Sexual Activity    Alcohol use: No    Drug use: No    Sexual activity: Not on file   Other Topics Concern    Not on file   Social History Narrative    Not on file     Social Determinants of Health     Financial Resource Strain: Low Risk     Difficulty of Paying Living Expenses: Not hard at all   Food Insecurity: No Food Insecurity    Worried About [address] in the Last Year: Never true    [facility] [employer] in the Last Year: Never true   Transportation Needs: No Transportation Needs    Lack of Transportation (Medical): No    Lack of Transportation (Non-Medical):  No   Physical Activity: Inactive    Days of Exercise per Week: 0 days    Minutes of Exercise per Session: 0 min   Stress:     Feeling of Stress : Not on file   Social Connections:     Frequency of Communication with Friends and Family: Not on file    Frequency of Social Gatherings with Friends and Family: Not on file    Attends [employer] Services: Not on file    Active Member of Clubs or Organizations: Not on file    Attends Club or Organization Meetings: Not on file    Marital Status: Not on file   Intimate Partner Violence: Not At Risk    Fear of Current or Ex-Partner: No    Emotionally Abused: No    Physically Abused: No    Sexually Abused: No   Housing Stability:     Unable to Pay for Housing in the Last Year: Not on file    Number of [place] in the Last Year: Not on file    Unstable Housing in the Last Year: Not on file       Review of Systems:  Review of Systems   Constitutional: Negative for chills and fever. Cardiovascular: Negative for chest pain and palpitations. Respiratory: Negative for cough and shortness of breath. Skin: Negative for itching. Musculoskeletal: Positive for arthritis and joint pain. Negative for gout and stiffness. Gastrointestinal: Negative for constipation. Neurological: Positive for numbness and tingling. Negative for disturbances in coordination and loss of balance. Physical Exam:  Ht 5' 6\" (1.676 m)   Wt 223 lb 12.8 oz (101.5 kg)   BMI 36.12 kg/m²     Physical Exam  HENT:      Head: Normocephalic. Pulmonary:      Effort: Pulmonary effort is normal.   Musculoskeletal:      Right shoulder: Tenderness present. Left shoulder: Tenderness present. Cervical back: Normal range of motion. Right knee: Tenderness present. Left knee: Tenderness present. Skin:     General: Skin is warm and dry. Neurological:      Mental Status: He is alert and oriented to person, place, and time.          Record/Diagnostics Review:    Last demetrice 4/2022 and was appropriate     Assessment:  Problem List Items Addressed This Visit     Inflammatory arthropathy    Relevant Medications    oxyCODONE-acetaminophen (PERCOCET) 5-325 MG per tablet (Start on 5/25/2022)      Other Visit Diagnoses     Encounter for long-term opiate analgesic use        Relevant Medications    oxyCODONE-acetaminophen (PERCOCET) 5-325 MG per tablet (Start on 5/25/2022)    Other chronic pain        Relevant Medications    oxyCODONE-acetaminophen (PERCOCET) 5-325 MG per tablet (Start on 5/25/2022)    Rheumatoid arthritis, involving unspecified site, unspecified whether rheumatoid factor present ([facility] [address]        Relevant Medications    oxyCODONE-acetaminophen (PERCOCET) 5-325 MG per tablet (Start on 5/25/2022)             Treatment Plan:  Patient relates current medications are helping the pain. Patient reports taking pain medications as prescribed, denies obtaining medications from different sources and denies use of illegal drugs. Patient denies side effects from medications like nausea, vomiting, constipation or drowsiness. Patient reports current activities of daily living are possible due to medications and would like to continue them. As always, we encourage daily stretching and strengthening exercises, and recommend minimizing use of pain medications unless patient cannot get through daily activities due to pain. Contract requirements met. Continue opioid therapy. Script written for percocet  Discussed different treatment options including continued conservative care such as physical therapy, chiropractic care, acupuncture. Discussed different interventional options such as epidural steroids or medial branch blocks. Also discussed surgical evaluation. Follow up appointment made for 4 weeks    I have reviewed the chief complaint and history of present illness (including ROS and PFSH) and vital documentation by my staff and I agree with their documentation and have added where applicable.

## 2022-06-21 ENCOUNTER — TELEMEDICINE (OUTPATIENT)
Dept: PAIN MANAGEMENT | Age: 72
End: 2022-06-21
Payer: MEDICARE

## 2022-06-21 DIAGNOSIS — M25.512 CHRONIC LEFT SHOULDER PAIN: Primary | ICD-10-CM

## 2022-06-21 DIAGNOSIS — G89.29 CHRONIC LEFT SHOULDER PAIN: Primary | ICD-10-CM

## 2022-06-21 DIAGNOSIS — M19.90 INFLAMMATORY ARTHROPATHY: ICD-10-CM

## 2022-06-21 DIAGNOSIS — M05.79 RHEUMATOID ARTHRITIS INVOLVING MULTIPLE SITES WITH POSITIVE RHEUMATOID FACTOR (HCC): ICD-10-CM

## 2022-06-21 DIAGNOSIS — G89.29 OTHER CHRONIC PAIN: ICD-10-CM

## 2022-06-21 DIAGNOSIS — Z79.891 CHRONIC USE OF OPIATE FOR THERAPEUTIC PURPOSE: ICD-10-CM

## 2022-06-21 PROCEDURE — 1036F TOBACCO NON-USER: CPT | Performed by: NURSE PRACTITIONER

## 2022-06-21 PROCEDURE — G8417 CALC BMI ABV UP PARAM F/U: HCPCS | Performed by: NURSE PRACTITIONER

## 2022-06-21 PROCEDURE — 99213 OFFICE O/P EST LOW 20 MIN: CPT | Performed by: NURSE PRACTITIONER

## 2022-06-21 PROCEDURE — G8427 DOCREV CUR MEDS BY ELIG CLIN: HCPCS | Performed by: NURSE PRACTITIONER

## 2022-06-21 PROCEDURE — 3017F COLORECTAL CA SCREEN DOC REV: CPT | Performed by: NURSE PRACTITIONER

## 2022-06-21 PROCEDURE — 1123F ACP DISCUSS/DSCN MKR DOCD: CPT | Performed by: NURSE PRACTITIONER

## 2022-06-21 RX ORDER — OXYCODONE HYDROCHLORIDE AND ACETAMINOPHEN 5; 325 MG/1; MG/1
1 TABLET ORAL EVERY 8 HOURS PRN
Qty: 90 TABLET | Refills: 0 | Status: SHIPPED | OUTPATIENT
Start: 2022-06-23 | End: 2022-07-21 | Stop reason: SDUPTHER

## 2022-06-21 ASSESSMENT — ENCOUNTER SYMPTOMS
COUGH: 0
SHORTNESS OF BREATH: 0
CONSTIPATION: 0
BACK PAIN: 1

## 2022-06-21 NOTE — PROGRESS NOTES
Chief Complaint   Patient presents with    Shoulder Pain    Medication Refill         [facility]     Patient complains of diffuse joint pain. Hx of rheumatoid arthritis. He followed with rheumatology who retired and waiting for appt next month with new office. Currently prescribed mobic and  methotrexate. He alsoakes percocet 5/325 TID for pain. He has been stable and compliant on this dose for many years. He has had surgery on both shoulders and left knee. Shoulder Pain   The pain is present in the left shoulder and right shoulder. This is a chronic problem. The current episode started more than 1 year ago. There has been no history of extremity trauma. The problem occurs intermittently. The problem has been gradually improving. The quality of the pain is described as sharp. The pain is at a severity of 4/10. The pain is mild. Pertinent negatives include no fever, inability to bear weight, itching, joint locking, joint swelling, limited range of motion, numbness, stiffness or tingling. The symptoms are aggravated by activity. He has tried acetaminophen, cold, heat, movement, NSAIDS, rest, OTC ointments and OTC pain meds for the symptoms. The treatment provided mild relief. Family history includes rheumatoid arthritis. Family history does not include gout. His past medical history is significant for osteoarthritis and rheumatoid arthritis. There is no history of diabetes or gout. Patient denies any new neurological symptoms. No bowel or bladder incontinence, no weakness, and no falling. Pill count: appropriate    Morphine equivalent: 22.5    Controlled Substance Monitoring:    Acute and Chronic Pain Monitoring:   RX Monitoring 6/21/2022   Attestation -   Periodic Controlled Substance Monitoring Possible medication side effects, risk of tolerance/dependence & alternative treatments discussed. ;No signs of potential drug abuse or diversion identified.;Obtaining appropriate analgesic effect of treatment. ;Assessed functional status. Periodic Controlled Substance Monitoring: Possible medication side effects, risk of tolerance/dependence & alternative treatments discussed. ,No signs of potential drug abuse or diversion identified. ,Obtaining appropriate analgesic effect of treatment. ,Assessed functional status. TOMAS Vogt - CNP)      Past Medical History:   Diagnosis Date    Calcium pyrophosphate deposition disease     Diverticulosis of colon     Hypertension     Osteoarthritis        Past Surgical History:   Procedure Laterality Date    COLONOSCOPY  11/26/2008    diverticulosis    COLONOSCOPY  12/29/2017    Diverticulosis    HERNIA REPAIR      PILONIDAL CYST EXCISION      DE COLON CA SCRN NOT  [address] IND N/A 12/29/2017    COLONOSCOPY performed by Dillan Castro MD at [address] [place] [address] Right 06/2020    TOTAL KNEE ARTHROPLASTY Left 1/19/2016    [facility]/Dr. Mac       No Known Allergies      Current Outpatient Medications:     [START ON 6/23/2022] oxyCODONE-acetaminophen (PERCOCET) 5-325 MG per tablet, Take 1 tablet by mouth every 8 hours as needed for Pain for up to 30 days.  Take lowest dose possible to manage pain, Disp: 90 tablet, Rfl: 0    enalapril (VASOTEC) 10 MG tablet, TAKE 1 TABLET BY MOUTH EVERY DAY, Disp: 90 tablet, Rfl: 1    amLODIPine (NORVASC) 10 MG tablet, TAKE 1 TABLET BY MOUTH EVERY DAY, Disp: 90 tablet, Rfl: 1    metoprolol tartrate (LOPRESSOR) 25 MG tablet, Take one tab by mouth bid, Disp: 180 tablet, Rfl: 1    atorvastatin (LIPITOR) 20 MG tablet, TAKE 1 TABLET BY MOUTH EVERY DAY, Disp: 90 tablet, Rfl: 1    ELDERBERRY PO, Take 2 tablets by mouth daily, Disp: , Rfl:     Multiple Vitamins-Minerals (PRESERVISION/LUTEIN PO), Take 2 capsules by mouth daily, Disp: , Rfl:     Multiple Vitamins-Minerals (THERAGRAN-M PO), Take 1 tablet by mouth daily, Disp: , Rfl:     folic acid (FOLVITE) 1 MG tablet, daily, Disp: , Rfl:     methotrexate (RHEUMATREX) 2.5 MG chemo tablet, Take 2.5 mg by mouth 10 tablets weekly, Disp: , Rfl:     meloxicam (MOBIC) 15 MG tablet, Take by mouth daily, Disp: , Rfl: 1    Multiple Vitamins-Minerals (MULTIVITAMIN PO), Take  by mouth., Disp: , Rfl:     CALCIUM PO, Take  by mouth., Disp: , Rfl:     History reviewed. No pertinent family history. Social History     Socioeconomic History    Marital status:      Spouse name: Not on file    Number of children: Not on file    Years of education: Not on file    Highest education level: Not on file   Occupational History    Not on file   Tobacco Use    Smoking status: Former Smoker     Packs/day: 1.50     Years: 25.00     Pack years: 45.53     Quit date:      Years since quittin.4    Smokeless tobacco: Never Used    Tobacco comment: estimated quit date   Substance and Sexual Activity    Alcohol use: No    Drug use: No    Sexual activity: Not on file   Other Topics Concern    Not on file   Social History Narrative    Not on file     Social Determinants of Health     Financial Resource Strain: Low Risk     Difficulty of Paying Living Expenses: Not hard at all   Food Insecurity: No Food Insecurity    Worried About [address] in the Last Year: Never true    [address] [facility] [address] in the Last Year: Never true   Transportation Needs: No Transportation Needs    Lack of Transportation (Medical): No    Lack of Transportation (Non-Medical):  No   Physical Activity: Inactive    Days of Exercise per Week: 0 days    Minutes of Exercise per Session: 0 min   Stress:     Feeling of Stress : Not on file   Social Connections:     Frequency of Communication with Friends and Family: Not on file    Frequency of Social Gatherings with Friends and Family: Not on file    Attends Restorationist Services: Not on file    Active Member of Clubs or Organizations: Not on file    Attends Club or Organization Meetings: Not on file    Marital Status: Not on file   Intimate Partner Violence: Not At Risk    Fear of Current or Ex-Partner: No    Emotionally Abused: No    Physically Abused: No    Sexually Abused: No   Housing Stability:     Unable to Pay for Housing in the Last Year: Not on file    Number of Places Lived in the Last Year: Not on file    Unstable Housing in the Last Year: Not on file       Review of Systems:  Review of Systems   Constitutional: Negative for chills and fever. Cardiovascular: Negative for chest pain. Respiratory: Negative for cough and shortness of breath. Skin: Negative for itching. Musculoskeletal: Positive for back pain. Negative for gout and stiffness. Gastrointestinal: Negative for constipation. Neurological: Negative for numbness and tingling. Physical Exam:  There were no vitals taken for this visit. Record/Diagnostics Review:    Last demetrice 4/22  and was appropriate     Assessment:  Problem List Items Addressed This Visit     Chronic left shoulder pain - Primary    Relevant Medications    oxyCODONE-acetaminophen (PERCOCET) 5-325 MG per tablet (Start on 6/23/2022)    Chronic use of opiate for therapeutic purpose    Rheumatoid arthritis involving multiple sites with positive rheumatoid factor (HCC)    Relevant Medications    oxyCODONE-acetaminophen (PERCOCET) 5-325 MG per tablet (Start on 6/23/2022)    Inflammatory arthropathy    Relevant Medications    oxyCODONE-acetaminophen (PERCOCET) 5-325 MG per tablet (Start on 6/23/2022)      Other Visit Diagnoses     Other chronic pain        Relevant Medications    oxyCODONE-acetaminophen (PERCOCET) 5-325 MG per tablet (Start on 6/23/2022)             Treatment Plan:  Patient relates current medications are helping the pain. Patient reports taking pain medications as prescribed, denies obtaining medications from different sources and denies use of illegal drugs. Patient denies side effects from medications like nausea, vomiting, constipation or drowsiness.  Patient reports current activities of daily living are possible due to medications and would like to continue them. As always, we encourage daily stretching and strengthening exercises, and recommend minimizing use of pain medications unless patient cannot get through daily activities due to pain. Contract requirements met. Continue opioid therapy. Script written for percocet  Follow up VV  appointment made for 4 weeks    I have reviewed the chief complaint and history of present illness (including ROS and PFSH) and vital documentation by my staff and I agree with their documentation and have added where applicable. Pt was evaluated through a synchronous (real-time) audio-video encounter. The patient (or guardian if applicable) is aware that this is a billable service. Verbal consent to proceed has been obtained within the past 12 months. The visit was conducted pursuant to the emergency declaration under the [address] [place] [address] waiver authority and the [employer] General Act. Patient identification was verified, and a caregiver was present when appropriate. The patient was located in a state where the provider was credentialed to provide care.     Total time spent for this encounter: Not billed by time

## 2022-07-20 ENCOUNTER — TELEPHONE (OUTPATIENT)
Dept: PAIN MANAGEMENT | Age: 72
End: 2022-07-20

## 2022-07-21 DIAGNOSIS — M19.90 INFLAMMATORY ARTHROPATHY: ICD-10-CM

## 2022-07-21 DIAGNOSIS — G89.29 OTHER CHRONIC PAIN: ICD-10-CM

## 2022-07-21 RX ORDER — OXYCODONE HYDROCHLORIDE AND ACETAMINOPHEN 5; 325 MG/1; MG/1
1 TABLET ORAL EVERY 8 HOURS PRN
Qty: 42 TABLET | Refills: 0 | Status: SHIPPED | OUTPATIENT
Start: 2022-07-23 | End: 2022-08-05 | Stop reason: SDUPTHER

## 2022-07-25 ENCOUNTER — OFFICE VISIT (OUTPATIENT)
Dept: PRIMARY CARE CLINIC | Age: 72
End: 2022-07-25
Payer: MEDICARE

## 2022-07-25 ENCOUNTER — HOSPITAL ENCOUNTER (OUTPATIENT)
Age: 72
Setting detail: SPECIMEN
Discharge: HOME | End: 2022-07-25

## 2022-07-25 VITALS
HEIGHT: 67 IN | BODY MASS INDEX: 34.21 KG/M2 | WEIGHT: 218 LBS | SYSTOLIC BLOOD PRESSURE: 134 MMHG | HEART RATE: 58 BPM | DIASTOLIC BLOOD PRESSURE: 86 MMHG | OXYGEN SATURATION: 96 %

## 2022-07-25 DIAGNOSIS — Z12.5 SCREENING FOR PROSTATE CANCER: ICD-10-CM

## 2022-07-25 DIAGNOSIS — Z12.5 SCREENING FOR PROSTATE CANCER: Primary | ICD-10-CM

## 2022-07-25 LAB — PROSTATE SPECIFIC ANTIGEN: 0.53 NG/ML

## 2022-07-25 PROCEDURE — 3017F COLORECTAL CA SCREEN DOC REV: CPT | Performed by: NURSE PRACTITIONER

## 2022-07-25 PROCEDURE — 1123F ACP DISCUSS/DSCN MKR DOCD: CPT | Performed by: NURSE PRACTITIONER

## 2022-07-25 PROCEDURE — G0438 PPPS, INITIAL VISIT: HCPCS | Performed by: NURSE PRACTITIONER

## 2022-07-25 RX ORDER — PREDNISONE 1 MG/1
TABLET ORAL
COMMUNITY
Start: 2022-07-06

## 2022-07-25 ASSESSMENT — PATIENT HEALTH QUESTIONNAIRE - PHQ9
SUM OF ALL RESPONSES TO PHQ QUESTIONS 1-9: 0
2. FEELING DOWN, DEPRESSED OR HOPELESS: 0
SUM OF ALL RESPONSES TO PHQ9 QUESTIONS 1 & 2: 0
SUM OF ALL RESPONSES TO PHQ QUESTIONS 1-9: 0
1. LITTLE INTEREST OR PLEASURE IN DOING THINGS: 0
SUM OF ALL RESPONSES TO PHQ QUESTIONS 1-9: 0
SUM OF ALL RESPONSES TO PHQ QUESTIONS 1-9: 0

## 2022-07-25 ASSESSMENT — LIFESTYLE VARIABLES
HOW OFTEN DO YOU HAVE A DRINK CONTAINING ALCOHOL: NEVER
HOW MANY STANDARD DRINKS CONTAINING ALCOHOL DO YOU HAVE ON A TYPICAL DAY: PATIENT DOES NOT DRINK

## 2022-07-25 NOTE — PROGRESS NOTES
Medicare Annual Wellness Visit    Dilshad Moser. is here for Hypertension (F/u. Home readings WNL)    Assessment & Plan   Screening for prostate cancer  -     PSA Screening; Future    Recommendations for Preventive Services Due: see orders and patient instructions/AVS.  Recommended screening schedule for the next 5-10 years is provided to the patient in written form: see Patient Instructions/AVS.     Return in about 4 months (around 11/25/2022) for Hypertension. Subjective   The following acute and/or chronic problems were also addressed today:  HTn- Well controlled, asymptomatic     Patient's complete Health Risk Assessment and screening values have been reviewed and are found in Flowsheets. The following problems were reviewed today and where indicated follow up appointments were made and/or referrals ordered. Positive Risk Factor Screenings with Interventions:             Opioid Risk: (Low risk score <55) Opioid risk score: 9    Patient is low risk for opioid use disorder or overdose. Last PDMP [facility] as Reviewed:  Review User Review Instant Review Result   Chandrakant Marcelino 7/21/2022  9:55 AM Reviewed PDMP [1]     Last Controlled Substance Monitoring Documentation      [address] from 6/21/2022 in [facility]   Periodic Controlled Substance Monitoring Possible medication side effects, risk of tolerance/dependence & alternative treatments discussed., No signs of potential drug abuse or diversion identified., Obtaining appropriate analgesic effect of treatment., Assessed functional status.  filed at 06/21/2022 6491           General Health and ACP:  General  In general, how would you say your health is?: Fair  In the past 7 days, have you experienced any of the following: New or Increased Pain, New or Increased Fatigue, Loneliness, Social Isolation, Stress or Anger?: (!) Yes  Select all that apply: (!) New or Increased Pain  Do you get the social and emotional support that you need?: Yes  Do you have a Living Will?: Yes    Advance Directives       Power of [address] Will ACP-Advance Directive ACP-Power of     Not on File Not on File Not on File Not on File        General Health Risk Interventions:  Has living will and POA for healthcare     Health Habits/Nutrition:  Physical Activity: Sufficiently Active    Days of Exercise per Week: 6 days    Minutes of Exercise per Session: 30 min     Have you lost any weight without trying in the past 3 months?: No  Body mass index: (!) 34.14  Have you seen the dentist within the past year?: Yes  Health Habits/Nutrition Interventions:  Dental exam overdue:  patient declines dental evaluation, has dentures, no issues  Declines dental exam, will f/u as needed              Objective   Vitals:    07/25/22 0851   BP: 134/86   Pulse: 58   SpO2: 96%   Weight: 218 lb (98.9 kg)   Height: 5' 7\" (1.702 m)      Body mass index is 34.14 kg/m². Pulmonary/Chest: clear to auscultation bilaterally- no wheezes, rales or rhonchi, normal air movement, no respiratory distress  Cardiovascular: normal rate, normal S1 and S2, no gallops, intact distal pulses, and no carotid bruits  Extremities: no cyanosis and no clubbing       No Known Allergies  Prior to Visit Medications    Medication Sig Taking? Authorizing Provider   predniSONE (DELTASONE) 5 MG tablet TAKE 1 TABLET BY MOUTH ONCE A DAY  Historical Provider, MD   oxyCODONE-acetaminophen (PERCOCET) 5-325 MG per tablet Take 1 tablet by mouth every 8 hours as needed for Pain for up to 14 days.  Take lowest dose possible to manage pain  TOMAS Barclay CNP   enalapril (VASOTEC) 10 MG tablet TAKE 1 TABLET BY MOUTH EVERY DAY  TOMAS Cintron CNP   amLODIPine (NORVASC) 10 MG tablet TAKE 1 TABLET BY MOUTH EVERY DAY  TOMAS Cintron CNP   metoprolol tartrate (LOPRESSOR) 25 MG tablet Take one tab by mouth bid  TOMAS Sin CNP   atorvastatin (LIPITOR) 20 MG tablet TAKE 1 TABLET BY MOUTH EVERY DAY  TOMAS Cintron CNP   ELDERBERRY PO Take 2 tablets by mouth daily  Historical Provider, MD   Multiple Vitamins-Minerals (PRESERVISION/LUTEIN PO) Take 2 capsules by mouth daily  Historical Provider, MD   Multiple Vitamins-Minerals (THERAGRAN-M PO) Take 1 tablet by mouth daily  Historical Provider, MD   folic acid (FOLVITE) 1 MG tablet daily  Historical Provider, MD   methotrexate (RHEUMATREX) 2.5 MG chemo tablet Take 2.5 mg by mouth 10 tablets weekly  Historical Provider, MD   meloxicam (MOBIC) 15 MG tablet Take by mouth daily  Historical Provider, MD   Multiple Vitamins-Minerals (MULTIVITAMIN PO) Take  by mouth. Historical Provider, MD   CALCIUM PO Take  by mouth.   Historical Provider, MD Khanna (Including outside providers/suppliers regularly involved in providing care):   Patient Care Team:  TOMAS Osei CNP as PCP - General (Nurse Practitioner)  TOMAS Osei CNP as PCP - [facility]  [facility] Empaneled Provider  Daniel Pascual (Rheumatology)  Erika Dan as Consulting Physician (Orthopedic Surgery)     Reviewed and updated this visit:  Tobacco  Allergies  Meds  Problems  Med Hx  Surg Hx  Soc Hx  Fam Hx

## 2022-08-01 RX ORDER — ATORVASTATIN CALCIUM 20 MG/1
TABLET, FILM COATED ORAL
Qty: 90 TABLET | Refills: 1 | Status: SHIPPED | OUTPATIENT
Start: 2022-08-01

## 2022-08-05 ENCOUNTER — OFFICE VISIT (OUTPATIENT)
Dept: PAIN MANAGEMENT | Age: 72
End: 2022-08-05
Payer: MEDICARE

## 2022-08-05 VITALS — BODY MASS INDEX: 32.71 KG/M2 | HEIGHT: 67 IN | WEIGHT: 208.4 LBS

## 2022-08-05 DIAGNOSIS — M19.90 INFLAMMATORY ARTHROPATHY: ICD-10-CM

## 2022-08-05 DIAGNOSIS — M05.79 RHEUMATOID ARTHRITIS INVOLVING MULTIPLE SITES WITH POSITIVE RHEUMATOID FACTOR (HCC): Primary | ICD-10-CM

## 2022-08-05 DIAGNOSIS — Z79.891 ENCOUNTER FOR LONG-TERM OPIATE ANALGESIC USE: ICD-10-CM

## 2022-08-05 DIAGNOSIS — G89.29 OTHER CHRONIC PAIN: ICD-10-CM

## 2022-08-05 PROCEDURE — 1123F ACP DISCUSS/DSCN MKR DOCD: CPT | Performed by: NURSE PRACTITIONER

## 2022-08-05 PROCEDURE — 99213 OFFICE O/P EST LOW 20 MIN: CPT | Performed by: NURSE PRACTITIONER

## 2022-08-05 RX ORDER — OXYCODONE HYDROCHLORIDE AND ACETAMINOPHEN 5; 325 MG/1; MG/1
1 TABLET ORAL EVERY 8 HOURS PRN
Qty: 42 TABLET | Refills: 0 | Status: SHIPPED | OUTPATIENT
Start: 2022-08-05 | End: 2022-08-19 | Stop reason: SDUPTHER

## 2022-08-05 ASSESSMENT — ENCOUNTER SYMPTOMS
CONSTIPATION: 0
COUGH: 0
SHORTNESS OF BREATH: 0

## 2022-08-05 NOTE — PROGRESS NOTES
Chief Complaint   Patient presents with    Shoulder Pain    Medication Refill         [facility] Patient complains of diffuse joint pain. Hx of rheumatoid arthritis. He now has a new rheumatologist that he saw last month. He reports he had updated imaging done  and was told he will need right knee replaced soon. Currently prescribed mobic and methotrexate. He also takes percocet 5/325 TID for pain. He has been stable and compliant on this dose for many years. He has had surgery on both shoulders and left knee. Shoulder Pain   The pain is present in the left shoulder. This is a chronic problem. The current episode started more than 1 year ago. There has been no history of extremity trauma. The problem occurs intermittently. The problem has been unchanged. The quality of the pain is described as pounding and sharp. The pain is at a severity of 4/10. The pain is moderate. Associated symptoms include a limited range of motion. Pertinent negatives include no fever, numbness, stiffness or tingling. The symptoms are aggravated by activity, lying down, cold, heat and standing. He has tried cold, heat, movement, rest, OTC pain meds, OTC ointments and acetaminophen for the symptoms. The treatment provided mild relief. Family history includes rheumatoid arthritis. His past medical history is significant for osteoarthritis and rheumatoid arthritis. Patient denies any new neurological symptoms. No bowel or bladder incontinence, no weakness, and no falling. Pill count: appropriate     Morphine equivalent: 22.5    Controlled Substance Monitoring:    Acute and Chronic Pain Monitoring:   RX Monitoring 8/5/2022   Attestation -   Periodic Controlled Substance Monitoring Possible medication side effects, risk of tolerance/dependence & alternative treatments discussed. ;No signs of potential drug abuse or diversion identified.;Obtaining appropriate analgesic effect of treatment.             Past Medical History: Diagnosis Date    Calcium pyrophosphate deposition disease     Diverticulosis of colon     Hypertension     Osteoarthritis        Past Surgical History:   Procedure Laterality Date    COLONOSCOPY  11/26/2008    diverticulosis    COLONOSCOPY  12/29/2017    Diverticulosis    HERNIA REPAIR      PILONIDAL CYST EXCISION      [facility] COLON CA SCRN NOT [facility] IND N/A 12/29/2017    COLONOSCOPY performed by Reshma Dominique MD at [address] Right 06/2020    TOTAL KNEE ARTHROPLASTY Left 1/19/2016    [place]/Dr. Mac       No Known Allergies      Current Outpatient Medications:     atorvastatin (LIPITOR) 20 MG tablet, TAKE 1 TABLET BY MOUTH EVERY DAY, Disp: 90 tablet, Rfl: 1    predniSONE (DELTASONE) 5 MG tablet, TAKE 1 TABLET BY MOUTH ONCE A DAY, Disp: , Rfl:     oxyCODONE-acetaminophen (PERCOCET) 5-325 MG per tablet, Take 1 tablet by mouth every 8 hours as needed for Pain for up to 14 days. Take lowest dose possible to manage pain, Disp: 42 tablet, Rfl: 0    enalapril (VASOTEC) 10 MG tablet, TAKE 1 TABLET BY MOUTH EVERY DAY, Disp: 90 tablet, Rfl: 1    amLODIPine (NORVASC) 10 MG tablet, TAKE 1 TABLET BY MOUTH EVERY DAY, Disp: 90 tablet, Rfl: 1    metoprolol tartrate (LOPRESSOR) 25 MG tablet, Take one tab by mouth bid, Disp: 180 tablet, Rfl: 1    ELDERBERRY PO, Take 2 tablets by mouth daily, Disp: , Rfl:     Multiple Vitamins-Minerals (PRESERVISION/LUTEIN PO), Take 2 capsules by mouth daily, Disp: , Rfl:     Multiple Vitamins-Minerals (THERAGRAN-M PO), Take 1 tablet by mouth daily, Disp: , Rfl:     folic acid (FOLVITE) 1 MG tablet, daily, Disp: , Rfl:     methotrexate (RHEUMATREX) 2.5 MG chemo tablet, Take 2.5 mg by mouth 10 tablets weekly, Disp: , Rfl:     meloxicam (MOBIC) 15 MG tablet, Take by mouth daily, Disp: , Rfl: 1    Multiple Vitamins-Minerals (MULTIVITAMIN PO), Take  by mouth., Disp: , Rfl:     CALCIUM PO, Take  by mouth., Disp: , Rfl:     No family history on file.     Social History Socioeconomic History    Marital status:      Spouse name: Not on file    Number of children: Not on file    Years of education: Not on file    Highest education level: Not on file   Occupational History    Not on file   Tobacco Use    Smoking status: Former     Packs/day: 1.50     Years: 25.00     Pack years: 37.50     Types: Cigarettes     Quit date: 36     Years since quittin.6    Smokeless tobacco: Never    Tobacco comments:     estimated quit date   Substance and Sexual Activity    Alcohol use: No    Drug use: No    Sexual activity: Not on file   Other Topics Concern    Not on file   Social History Narrative    Not on file     Social Determinants of Health     Financial Resource Strain: Low Risk     Difficulty of Paying Living Expenses: Not hard at all   Food Insecurity: No Food Insecurity    Worried About [address] in the Last Year: Never true    [address] [employer] in the Last Year: Never true   Transportation Needs: No Transportation Needs    Lack of Transportation (Medical): No    Lack of Transportation (Non-Medical): No   Physical Activity: Sufficiently Active    Days of Exercise per Week: 6 days    Minutes of Exercise per Session: 30 min   Stress: Not on file   Social Connections: Not on file   Intimate Partner Violence: Not At Risk    Fear of Current or Ex-Partner: No    Emotionally Abused: No    Physically Abused: No    Sexually Abused: No   Housing Stability: Not on file       Review of Systems:  Review of Systems   Constitutional: Negative for chills and fever. Cardiovascular:  Negative for chest pain and palpitations. Respiratory:  Negative for cough and shortness of breath. Musculoskeletal:  Positive for arthritis and joint pain. Negative for stiffness. Gastrointestinal:  Negative for constipation. Neurological:  Negative for disturbances in coordination, loss of balance, numbness and tingling.      Physical Exam:  Ht 5' 7\" (1.702 m)   Wt 208 lb 6.4 oz (94.5 kg) (including ROS and PFSH) and vital documentation by my staff and I agree with their documentation and have added where applicable.

## 2022-08-19 ENCOUNTER — TELEPHONE (OUTPATIENT)
Dept: PAIN MANAGEMENT | Age: 72
End: 2022-08-19

## 2022-08-19 DIAGNOSIS — G89.29 OTHER CHRONIC PAIN: ICD-10-CM

## 2022-08-19 DIAGNOSIS — M19.90 INFLAMMATORY ARTHROPATHY: ICD-10-CM

## 2022-08-19 RX ORDER — OXYCODONE HYDROCHLORIDE AND ACETAMINOPHEN 5; 325 MG/1; MG/1
1 TABLET ORAL EVERY 8 HOURS PRN
Qty: 42 TABLET | Refills: 0 | Status: SHIPPED | OUTPATIENT
Start: 2022-08-19 | End: 2022-09-02 | Stop reason: SDUPTHER

## 2022-09-02 ENCOUNTER — TELEMEDICINE (OUTPATIENT)
Dept: PAIN MANAGEMENT | Age: 72
End: 2022-09-02
Payer: MEDICARE

## 2022-09-02 DIAGNOSIS — Z79.891 ENCOUNTER FOR LONG-TERM OPIATE ANALGESIC USE: ICD-10-CM

## 2022-09-02 DIAGNOSIS — M05.79 RHEUMATOID ARTHRITIS INVOLVING MULTIPLE SITES WITH POSITIVE RHEUMATOID FACTOR (HCC): Primary | ICD-10-CM

## 2022-09-02 DIAGNOSIS — G89.29 OTHER CHRONIC PAIN: ICD-10-CM

## 2022-09-02 DIAGNOSIS — M19.90 INFLAMMATORY ARTHROPATHY: ICD-10-CM

## 2022-09-02 PROCEDURE — 1123F ACP DISCUSS/DSCN MKR DOCD: CPT | Performed by: NURSE PRACTITIONER

## 2022-09-02 PROCEDURE — 99213 OFFICE O/P EST LOW 20 MIN: CPT | Performed by: NURSE PRACTITIONER

## 2022-09-02 RX ORDER — OXYCODONE HYDROCHLORIDE AND ACETAMINOPHEN 5; 325 MG/1; MG/1
1 TABLET ORAL EVERY 8 HOURS PRN
Qty: 90 TABLET | Refills: 0 | Status: SHIPPED | OUTPATIENT
Start: 2022-09-02 | End: 2022-09-30 | Stop reason: SDUPTHER

## 2022-09-02 ASSESSMENT — ENCOUNTER SYMPTOMS
CONSTIPATION: 0
SHORTNESS OF BREATH: 0
BACK PAIN: 1
COUGH: 0

## 2022-09-02 NOTE — PROGRESS NOTES
Chief Complaint   Patient presents with    Shoulder Pain    Medication Refill         [facility] Patient complains of diffuse joint pain. Hx of rheumatoid arthritis. He now has a new rheumatologist that he saw last month. He reports he had updated imaging done  and was told he will need right knee replaced soon. Currently prescribed mobic and methotrexate. He also takes percocet 5/325 TID for pain. He has been stable and compliant on this dose for many years. He has had surgery on both shoulders and left knee. Shoulder Pain   The pain is present in the left shoulder. This is a chronic problem. The current episode started more than 1 year ago. There has been no history of extremity trauma. The problem has been unchanged. The quality of the pain is described as sharp. The pain is at a severity of 6/10. The pain is moderate. Pertinent negatives include no fever, inability to bear weight, numbness, stiffness or tingling. The symptoms are aggravated by activity. He has tried heat, cold, movement, NSAIDS, oral narcotics, rest, OTC ointments and OTC pain meds for the symptoms. The treatment provided no relief. Family history includes gout and rheumatoid arthritis. His past medical history is significant for osteoarthritis and rheumatoid arthritis. Patient denies any new neurological symptoms. No bowel or bladder incontinence, no weakness, and no falling. Pill count: appropriate    Morphine equivalent: 22.5    Controlled Substance Monitoring:    Acute and Chronic Pain Monitoring:   RX Monitoring 9/2/2022   Attestation -   Periodic Controlled Substance Monitoring Possible medication side effects, risk of tolerance/dependence & alternative treatments discussed. ;No signs of potential drug abuse or diversion identified.;Obtaining appropriate analgesic effect of treatment.             Past Medical History:   Diagnosis Date    Calcium pyrophosphate deposition disease     Diverticulosis of colon     Hypertension Osteoarthritis        Past Surgical History:   Procedure Laterality Date    COLONOSCOPY  11/26/2008    diverticulosis    COLONOSCOPY  12/29/2017    Diverticulosis    HERNIA REPAIR      PILONIDAL CYST EXCISION      [place] COLON CA SCRN NOT  [address] IND N/A 12/29/2017    COLONOSCOPY performed by Pearl Vines MD at [address] Right 06/2020    TOTAL KNEE ARTHROPLASTY Left 1/19/2016    [place]/Dr. Mac       No Known Allergies      Current Outpatient Medications:     oxyCODONE-acetaminophen (PERCOCET) 5-325 MG per tablet, Take 1 tablet by mouth every 8 hours as needed for Pain for up to 14 days. Take lowest dose possible to manage pain, Disp: 42 tablet, Rfl: 0    atorvastatin (LIPITOR) 20 MG tablet, TAKE 1 TABLET BY MOUTH EVERY DAY, Disp: 90 tablet, Rfl: 1    predniSONE (DELTASONE) 5 MG tablet, TAKE 1 TABLET BY MOUTH ONCE A DAY, Disp: , Rfl:     enalapril (VASOTEC) 10 MG tablet, TAKE 1 TABLET BY MOUTH EVERY DAY, Disp: 90 tablet, Rfl: 1    amLODIPine (NORVASC) 10 MG tablet, TAKE 1 TABLET BY MOUTH EVERY DAY, Disp: 90 tablet, Rfl: 1    metoprolol tartrate (LOPRESSOR) 25 MG tablet, Take one tab by mouth bid, Disp: 180 tablet, Rfl: 1    ELDERBERRY PO, Take 2 tablets by mouth daily, Disp: , Rfl:     Multiple Vitamins-Minerals (PRESERVISION/LUTEIN PO), Take 2 capsules by mouth daily, Disp: , Rfl:     folic acid (FOLVITE) 1 MG tablet, daily, Disp: , Rfl:     methotrexate (RHEUMATREX) 2.5 MG chemo tablet, Take 2.5 mg by mouth 10 tablets weekly, Disp: , Rfl:     meloxicam (MOBIC) 15 MG tablet, Take by mouth daily, Disp: , Rfl: 1    Multiple Vitamins-Minerals (MULTIVITAMIN PO), Take  by mouth., Disp: , Rfl:     CALCIUM PO, Take  by mouth., Disp: , Rfl:     History reviewed. No pertinent family history.     Social History     Socioeconomic History    Marital status:      Spouse name: Not on file    Number of children: Not on file    Years of education: Not on file    Highest education level: Not on file   Occupational History    Not on file   Tobacco Use    Smoking status: Former     Packs/day: 1.50     Years: 25.00     Pack years: 37.50     Types: Cigarettes     Quit date: 1980     Years since quittin.6    Smokeless tobacco: Never    Tobacco comments:     estimated quit date   Substance and Sexual Activity    Alcohol use: No    Drug use: No    Sexual activity: Not on file   Other Topics Concern    Not on file   Social History Narrative    Not on file     Social Determinants of Health     Financial Resource Strain: Low Risk     Difficulty of Paying Living Expenses: Not hard at all   Food Insecurity: No Food Insecurity    Worried About [employer] [employer] in the Last Year: Never true    [address] [employer] in the Last Year: Never true   Transportation Needs: No Transportation Needs    Lack of Transportation (Medical): No    Lack of Transportation (Non-Medical): No   Physical Activity: Sufficiently Active    Days of Exercise per Week: 6 days    Minutes of Exercise per Session: 30 min   Stress: Not on file   Social Connections: Not on file   Intimate Partner Violence: Not At Risk    Fear of Current or Ex-Partner: No    Emotionally Abused: No    Physically Abused: No    Sexually Abused: No   Housing Stability: Not on file       Review of Systems:  Review of Systems   Constitutional: Negative for chills and fever. Cardiovascular:  Negative for chest pain and palpitations. Respiratory:  Negative for cough and shortness of breath. Musculoskeletal:  Positive for back pain and joint pain. Negative for stiffness. Gastrointestinal:  Negative for constipation. Neurological:  Negative for numbness and tingling. Physical Exam:  There were no vitals taken for this visit. Physical Exam  HENT:      Head: Normocephalic. Pulmonary:      Effort: Pulmonary effort is normal.   Neurological:      Mental Status: He is alert.    Psychiatric:         Mood and Affect: Mood normal.         Behavior: Behavior normal.       Record/Diagnostics Review:    Last demetrice 4/2022 and was appropriate     Assessment:  Problem List Items Addressed This Visit       Other chronic pain    Relevant Medications    oxyCODONE-acetaminophen (PERCOCET) 5-325 MG per tablet    Encounter for long-term opiate analgesic use    Rheumatoid arthritis involving multiple sites with positive rheumatoid factor (HCC) - Primary    Relevant Medications    oxyCODONE-acetaminophen (PERCOCET) 5-325 MG per tablet    Inflammatory arthropathy    Relevant Medications    oxyCODONE-acetaminophen (PERCOCET) 5-325 MG per tablet          Treatment Plan:  Patient relates current medications are helping the pain. Patient reports taking pain medications as prescribed, denies obtaining medications from different sources and denies use of illegal drugs. Patient denies side effects from medications like nausea, vomiting, constipation or drowsiness. Patient reports current activities of daily living are possible due to medications and would like to continue them. As always, we encourage daily stretching and strengthening exercises, and recommend minimizing use of pain medications unless patient cannot get through daily activities due to pain. Contract requirements met. Continue opioid therapy. Script written for percocet  Follow up appointment made for 4 weeks    I have reviewed the chief complaint and history of present illness (including ROS and PFSH) and vital documentation by my staff and I agree with their documentation and have added where applicable. Kristyn Dubon Sr., was evaluated through a synchronous (real-time) audio-video encounter. The patient (or guardian if applicable) is aware that this is a billable service, which includes applicable co-pays. This Virtual Visit was conducted with patient's (and/or legal guardian's) consent.  The visit was conducted pursuant to the emergency declaration under the [facility] authority and the [employer] Act. Patient identification was verified, and a caregiver was present when appropriate. The patient was located at Home: [place] [address]. Provider was located at [facility] ([place] [address]): [address] [place] [address],  [address] [place] [address]. Total time spent for this encounter: Not billed by time    --TOMAS Parker CNP on 9/2/2022 at 12:13 PM    An electronic signature was used to authenticate this note.

## 2022-09-30 ENCOUNTER — TELEMEDICINE (OUTPATIENT)
Dept: PAIN MANAGEMENT | Age: 72
End: 2022-09-30
Payer: MEDICARE

## 2022-09-30 DIAGNOSIS — M19.90 INFLAMMATORY ARTHROPATHY: ICD-10-CM

## 2022-09-30 DIAGNOSIS — G89.29 OTHER CHRONIC PAIN: ICD-10-CM

## 2022-09-30 PROCEDURE — 99422 OL DIG E/M SVC 11-20 MIN: CPT | Performed by: NURSE PRACTITIONER

## 2022-09-30 RX ORDER — OXYCODONE HYDROCHLORIDE AND ACETAMINOPHEN 5; 325 MG/1; MG/1
1 TABLET ORAL EVERY 8 HOURS PRN
Qty: 90 TABLET | Refills: 0 | Status: SHIPPED | OUTPATIENT
Start: 2022-10-02 | End: 2022-10-28 | Stop reason: SDUPTHER

## 2022-09-30 ASSESSMENT — ENCOUNTER SYMPTOMS
COUGH: 0
CONSTIPATION: 0
SHORTNESS OF BREATH: 0

## 2022-09-30 NOTE — PROGRESS NOTES
Chief Complaint   Patient presents with    Shoulder Pain     Med refill         [facility]  Patient complains of diffuse joint pain. Hx of rheumatoid arthritis. He now has a new rheumatologist that he saw last month. He reports he had updated imaging done  and was told he will need right knee replaced soon. Currently prescribed mobic and methotrexate. He also takes percocet 5/325 TID for pain. He has been stable and compliant on this dose for many years. He has had surgery on both shoulders and left knee. Shoulder Pain   The pain is present in the left shoulder. This is a chronic problem. The current episode started more than 1 year ago. There has been no history of extremity trauma. The problem occurs intermittently. The problem has been unchanged. The quality of the pain is described as pounding and sharp. The pain is at a severity of 4/10. Associated symptoms include stiffness. Pertinent negatives include no fever, limited range of motion, numbness or tingling. The symptoms are aggravated by activity. He has tried cold, heat and oral narcotics for the symptoms. His past medical history is significant for osteoarthritis and rheumatoid arthritis. Patient denies any new neurological symptoms. No bowel or bladder incontinence, no weakness, and no falling. Pill count: appropriate / Oxycodone - pt states about 12 due 10/2    Morphine equivalent: 22.5    Controlled Substance Monitoring:    Acute and Chronic Pain Monitoring:   RX Monitoring 9/30/2022   Attestation -   Periodic Controlled Substance Monitoring Possible medication side effects, risk of tolerance/dependence & alternative treatments discussed. ;No signs of potential drug abuse or diversion identified.;Obtaining appropriate analgesic effect of treatment.             Past Medical History:   Diagnosis Date    Calcium pyrophosphate deposition disease     Diverticulosis of colon     Hypertension     Osteoarthritis        Past Surgical History: Years: 25.00     Pack years: 37.50     Types: Cigarettes     Quit date: 36     Years since quittin.7    Smokeless tobacco: Never    Tobacco comments:     estimated quit date   Substance and Sexual Activity    Alcohol use: No    Drug use: No    Sexual activity: Not on file   Other Topics Concern    Not on file   Social History Narrative    Not on file     Social Determinants of Health     Financial Resource Strain: Low Risk     Difficulty of Paying Living Expenses: Not hard at all   Food Insecurity: No Food Insecurity    Worried About [address] [employer] in the Last Year: Never true    [address] [facility] [employer] in the Last Year: Never true   Transportation Needs: No Transportation Needs    Lack of Transportation (Medical): No    Lack of Transportation (Non-Medical): No   Physical Activity: Sufficiently Active    Days of Exercise per Week: 6 days    Minutes of Exercise per Session: 30 min   Stress: Not on file   Social Connections: Not on file   Intimate Partner Violence: Not At Risk    Fear of Current or Ex-Partner: No    Emotionally Abused: No    Physically Abused: No    Sexually Abused: No   Housing Stability: Not on file       Review of Systems:  Review of Systems   Constitutional: Negative for chills and fever. Cardiovascular:  Negative for chest pain and palpitations. Respiratory:  Negative for cough and shortness of breath. Musculoskeletal:  Positive for joint pain and stiffness. Gastrointestinal:  Negative for constipation. Neurological:  Negative for disturbances in coordination, loss of balance, numbness and tingling. Physical Exam:  There were no vitals taken for this visit. Physical Exam  HENT:      Head: Normocephalic. Pulmonary:      Effort: Pulmonary effort is normal.   Neurological:      Mental Status: He is alert.    Psychiatric:         Mood and Affect: Mood normal.         Behavior: Behavior normal.       Record/Diagnostics Review:    Last demetrice 2022 and was appropriate Assessment:  Problem List Items Addressed This Visit       Other chronic pain    Relevant Medications    oxyCODONE-acetaminophen (PERCOCET) 5-325 MG per tablet (Start on 10/2/2022)    Inflammatory arthropathy    Relevant Medications    oxyCODONE-acetaminophen (PERCOCET) 5-325 MG per tablet (Start on 10/2/2022)          Treatment Plan:  Patient relates current medications are helping the pain. Patient reports taking pain medications as prescribed, denies obtaining medications from different sources and denies use of illegal drugs. Patient denies side effects from medications like nausea, vomiting, constipation or drowsiness. Patient reports current activities of daily living are possible due to medications and would like to continue them. As always, we encourage daily stretching and strengthening exercises, and recommend minimizing use of pain medications unless patient cannot get through daily activities due to pain. Contract requirements met. Continue opioid therapy. Script written for percocet  Follow up appointment made for 4 weeks    I have reviewed the chief complaint and history of present illness (including ROS and PFSH) and vital documentation by my staff and I agree with their documentation and have added where applicable. Gerhardt Needy Sr., was evaluated through a synchronous (real-time) audio-video encounter. The patient (or guardian if applicable) is aware that this is a billable service, which includes applicable co-pays. This Virtual Visit was conducted with patient's (and/or legal guardian's) consent. The visit was conducted pursuant to the emergency declaration under the [address] [place] [address], [address] [place] [address] authority and the [employer] and [employer] Act. Patient identification was verified, and a caregiver was present when appropriate. The patient was located at Home: [place] [id number]. Provider was located at [facility] ([place] [address]): [facility],  [address] [place] [address]. Total time spent for this encounter: Not billed by time    --TOMAS Jarquin CNP on 9/30/2022 at 1:17 PM    An electronic signature was used to authenticate this note.

## 2022-10-11 DIAGNOSIS — I10 HYPERTENSION, UNSPECIFIED TYPE: ICD-10-CM

## 2022-10-28 ENCOUNTER — HOSPITAL ENCOUNTER (OUTPATIENT)
Age: 72
Setting detail: SPECIMEN
Discharge: HOME | End: 2022-10-28

## 2022-10-28 ENCOUNTER — OFFICE VISIT (OUTPATIENT)
Dept: PAIN MANAGEMENT | Age: 72
End: 2022-10-28
Payer: MEDICARE

## 2022-10-28 VITALS — BODY MASS INDEX: 32.96 KG/M2 | HEIGHT: 67 IN | WEIGHT: 210 LBS

## 2022-10-28 DIAGNOSIS — Z79.891 ENCOUNTER FOR LONG-TERM OPIATE ANALGESIC USE: ICD-10-CM

## 2022-10-28 DIAGNOSIS — M19.90 INFLAMMATORY ARTHROPATHY: ICD-10-CM

## 2022-10-28 DIAGNOSIS — M05.79 RHEUMATOID ARTHRITIS INVOLVING MULTIPLE SITES WITH POSITIVE RHEUMATOID FACTOR (HCC): Primary | ICD-10-CM

## 2022-10-28 DIAGNOSIS — G89.29 OTHER CHRONIC PAIN: ICD-10-CM

## 2022-10-28 PROCEDURE — 1036F TOBACCO NON-USER: CPT | Performed by: NURSE PRACTITIONER

## 2022-10-28 PROCEDURE — G8427 DOCREV CUR MEDS BY ELIG CLIN: HCPCS | Performed by: NURSE PRACTITIONER

## 2022-10-28 PROCEDURE — 99213 OFFICE O/P EST LOW 20 MIN: CPT | Performed by: NURSE PRACTITIONER

## 2022-10-28 PROCEDURE — G8484 FLU IMMUNIZE NO ADMIN: HCPCS | Performed by: NURSE PRACTITIONER

## 2022-10-28 PROCEDURE — 1123F ACP DISCUSS/DSCN MKR DOCD: CPT | Performed by: NURSE PRACTITIONER

## 2022-10-28 PROCEDURE — 3017F COLORECTAL CA SCREEN DOC REV: CPT | Performed by: NURSE PRACTITIONER

## 2022-10-28 PROCEDURE — G8417 CALC BMI ABV UP PARAM F/U: HCPCS | Performed by: NURSE PRACTITIONER

## 2022-10-28 RX ORDER — DICLOFENAC SODIUM 75 MG/1
TABLET, DELAYED RELEASE ORAL
COMMUNITY
Start: 2022-10-11

## 2022-10-28 RX ORDER — OXYCODONE HYDROCHLORIDE AND ACETAMINOPHEN 5; 325 MG/1; MG/1
1 TABLET ORAL EVERY 8 HOURS PRN
Qty: 90 TABLET | Refills: 0 | Status: SHIPPED | OUTPATIENT
Start: 2022-11-03 | End: 2022-11-29 | Stop reason: SDUPTHER

## 2022-10-28 RX ORDER — OMEPRAZOLE 20 MG/1
CAPSULE, DELAYED RELEASE ORAL
COMMUNITY
Start: 2022-09-07

## 2022-10-28 ASSESSMENT — ENCOUNTER SYMPTOMS
SHORTNESS OF BREATH: 0
COUGH: 0
BACK PAIN: 1
CONSTIPATION: 0

## 2022-10-28 NOTE — PROGRESS NOTES
Chief Complaint   Patient presents with    Shoulder Pain    Medication Refill         [facility]  Patient complains of diffuse joint pain. Hx of rheumatoid arthritis. He now has a new rheumatologist. He reports he had updated imaging done and was told he will need right knee replaced soon. Currently prescribed mobic and methotrexate. He also takes percocet 5/325 TID for pain. He has been stable and compliant on this dose for many years. He has had surgery on both shoulders and left knee. Shoulder Pain   The pain is present in the left shoulder. This is a chronic problem. The current episode started more than 1 year ago. There has been no history of extremity trauma. The problem occurs constantly. The problem has been gradually improving. The quality of the pain is described as aching and sharp. The pain is at a severity of 3/10. The pain is moderate. Pertinent negatives include no fever, inability to bear weight, itching, joint locking, joint swelling, limited range of motion, numbness, stiffness or tingling. The symptoms are aggravated by cold, heat, contact, activity and lying down. He has tried acetaminophen, cold, heat, movement, OTC ointments, OTC pain meds, oral narcotics and rest for the symptoms. The treatment provided mild relief. Family history does not include gout or rheumatoid arthritis. His past medical history is significant for osteoarthritis and rheumatoid arthritis. There is no history of diabetes or gout. Patient denies any new neurological symptoms. No bowel or bladder incontinence, no weakness, and no falling. Pill count: appropriate due 11/3    Morphine equivalent: 22.5    Controlled Substance Monitoring:    Acute and Chronic Pain Monitoring:   RX Monitoring 10/28/2022   Attestation -   Periodic Controlled Substance Monitoring Possible medication side effects, risk of tolerance/dependence & alternative treatments discussed. ;No signs of potential drug abuse or diversion identified.;Obtaining appropriate analgesic effect of treatment. Past Medical History:   Diagnosis Date    Calcium pyrophosphate deposition disease     Diverticulosis of colon     Hypertension     Osteoarthritis        Past Surgical History:   Procedure Laterality Date    COLONOSCOPY  11/26/2008    diverticulosis    COLONOSCOPY  12/29/2017    Diverticulosis    HERNIA REPAIR      PILONIDAL CYST EXCISION      [place] COLON CA SCRN NOT [place] [facility] IND N/A 12/29/2017    COLONOSCOPY performed by Quirino Martinez MD at [address] Right 06/2020    TOTAL KNEE ARTHROPLASTY Left 1/19/2016    [place]/Dr. Mac       No Known Allergies      Current Outpatient Medications:     metoprolol tartrate (LOPRESSOR) 25 MG tablet, TAKE 1 TABLET BY MOUTH TWO TIMES A DAY **DUE TO APPOINTMENT, Disp: 180 tablet, Rfl: 1    oxyCODONE-acetaminophen (PERCOCET) 5-325 MG per tablet, Take 1 tablet by mouth every 8 hours as needed for Pain for up to 30 days. , Disp: 90 tablet, Rfl: 0    atorvastatin (LIPITOR) 20 MG tablet, TAKE 1 TABLET BY MOUTH EVERY DAY, Disp: 90 tablet, Rfl: 1    predniSONE (DELTASONE) 5 MG tablet, TAKE 1 TABLET BY MOUTH ONCE A DAY, Disp: , Rfl:     enalapril (VASOTEC) 10 MG tablet, TAKE 1 TABLET BY MOUTH EVERY DAY, Disp: 90 tablet, Rfl: 1    amLODIPine (NORVASC) 10 MG tablet, TAKE 1 TABLET BY MOUTH EVERY DAY, Disp: 90 tablet, Rfl: 1    ELDERBERRY PO, Take 2 tablets by mouth daily, Disp: , Rfl:     Multiple Vitamins-Minerals (PRESERVISION/LUTEIN PO), Take 2 capsules by mouth daily, Disp: , Rfl:     folic acid (FOLVITE) 1 MG tablet, daily, Disp: , Rfl:     methotrexate (RHEUMATREX) 2.5 MG chemo tablet, Take 2.5 mg by mouth 10 tablets weekly, Disp: , Rfl:     Multiple Vitamins-Minerals (MULTIVITAMIN PO), Take  by mouth., Disp: , Rfl:     CALCIUM PO, Take  by mouth., Disp: , Rfl:     No family history on file.     Social History     Socioeconomic History    Marital status:      Spouse name: Not on file    Number of children: Not on file    Years of education: Not on file    Highest education level: Not on file   Occupational History    Not on file   Tobacco Use    Smoking status: Former     Packs/day: 1.50     Years: 25.00     Pack years: 37.50     Types: Cigarettes     Quit date: 36     Years since quittin.8    Smokeless tobacco: Never    Tobacco comments:     estimated quit date   Substance and Sexual Activity    Alcohol use: No    Drug use: No    Sexual activity: Not on file   Other Topics Concern    Not on file   Social History Narrative    Not on file     Social Determinants of Health     Financial Resource Strain: Low Risk     Difficulty of Paying Living Expenses: Not hard at all   Food Insecurity: No Food Insecurity    Worried About [address] [employer] in the Last Year: Never true    [address] [employer] in the Last Year: Never true   Transportation Needs: No Transportation Needs    Lack of Transportation (Medical): No    Lack of Transportation (Non-Medical): No   Physical Activity: Sufficiently Active    Days of Exercise per Week: 6 days    Minutes of Exercise per Session: 30 min   Stress: Not on file   Social Connections: Not on file   Intimate Partner Violence: Not At Risk    Fear of Current or Ex-Partner: No    Emotionally Abused: No    Physically Abused: No    Sexually Abused: No   Housing Stability: Not on file       Review of Systems:  Review of Systems   Constitutional: Negative for chills and fever. Cardiovascular:  Negative for chest pain and palpitations. Respiratory:  Negative for cough and shortness of breath. Skin:  Negative for itching. Musculoskeletal:  Positive for back pain and joint pain. Negative for gout and stiffness. Gastrointestinal:  Negative for constipation. Neurological:  Negative for disturbances in coordination, loss of balance, numbness and tingling.      Physical Exam:  Ht 5' 7\" (1.702 m)   Wt 210 lb (95.3 kg)   BMI 32.89 kg/m²     Physical Exam  HENT: Head: Normocephalic. Pulmonary:      Effort: Pulmonary effort is normal.   Musculoskeletal:         General: Normal range of motion. Right shoulder: Tenderness present. Left shoulder: Tenderness present. Cervical back: Normal range of motion. Right knee: Tenderness present. Left knee: Tenderness present. Skin:     General: Skin is warm and dry. Neurological:      Mental Status: He is alert and oriented to person, place, and time. Record/Diagnostics Review:    Last demetrice 4/2022 and was appropriate     Assessment:  Problem List Items Addressed This Visit       Other chronic pain    Relevant Medications    oxyCODONE-acetaminophen (PERCOCET) 5-325 MG per tablet (Start on 11/3/2022)    diclofenac (VOLTAREN) 75 MG EC tablet    Encounter for long-term opiate analgesic use    Relevant Orders    DRUG SCREEN, PAIN    Rheumatoid arthritis involving multiple sites with positive rheumatoid factor ([facility] [address] - Primary    Relevant Medications    oxyCODONE-acetaminophen (PERCOCET) 5-325 MG per tablet (Start on 11/3/2022)    diclofenac (VOLTAREN) 75 MG EC tablet    Inflammatory arthropathy    Relevant Medications    oxyCODONE-acetaminophen (PERCOCET) 5-325 MG per tablet (Start on 11/3/2022)    diclofenac (VOLTAREN) 75 MG EC tablet          Treatment Plan:  Patient relates current medications are helping the pain. Patient reports taking pain medications as prescribed, denies obtaining medications from different sources and denies use of illegal drugs. Patient denies side effects from medications like nausea, vomiting, constipation or drowsiness. Patient reports current activities of daily living are possible due to medications and would like to continue them. As always, we encourage daily stretching and strengthening exercises, and recommend minimizing use of pain medications unless patient cannot get through daily activities due to pain. Contract requirements met. Continue opioid therapy. Script written for percocet  UDS for standard monitoring purposes  Follow up appointment made for 4 weeks    I have reviewed the chief complaint and history of present illness (including ROS and PFSH) and vital documentation by my staff and I agree with their documentation and have added where applicable.

## 2022-11-01 LAB
6-ACETYLMORPHINE, UR: NOT DETECTED
7-AMINOCLONAZEPAM, URINE: NOT DETECTED
ALPHA-OH-ALPRAZ, URINE: NOT DETECTED
ALPHA-OH-MIDAZOLAM, URINE: NOT DETECTED
ALPRAZOLAM, URINE: NOT DETECTED
AMPHETAMINES, URINE: NOT DETECTED
BARBITURATES, URINE: NOT DETECTED
BENZOYLECGONINE, UR: NOT DETECTED
BUPRENORPHINE URINE: NOT DETECTED
CARISOPRODOL, UR: NOT DETECTED
CLONAZEPAM, URINE: NOT DETECTED
CODEINE, URINE: NOT DETECTED
CREATININE URINE: 123.8 MG/DL (ref 20–400)
DIAZEPAM, URINE: NOT DETECTED
DRUGS EXPECTED, UR: NORMAL
EER HI RES INTERP UR: NORMAL
ETHYL GLUCURONIDE UR: NOT DETECTED
FENTANYL URINE: NOT DETECTED
GABAPENTIN: NOT DETECTED
HYDROCODONE, URINE: NOT DETECTED
HYDROMORPHONE, URINE: NOT DETECTED
LORAZEPAM, URINE: NOT DETECTED
MARIJUANA METAB, UR: NOT DETECTED
MDA, UR: NOT DETECTED
MDEA, EVE, UR: NOT DETECTED
MDMA URINE: NOT DETECTED
MEPERIDINE METAB, UR: NOT DETECTED
METHADONE, URINE: NOT DETECTED
METHAMPHETAMINE, URINE: NOT DETECTED
METHYLPHENIDATE: NOT DETECTED
MIDAZOLAM, URINE: NOT DETECTED
MORPHINE URINE: NOT DETECTED
NALOXONE URINE: NOT DETECTED
NORBUPRENORPHINE, URINE: NOT DETECTED
NORDIAZEPAM, URINE: NOT DETECTED
NORFENTANYL, URINE: NOT DETECTED
NORHYDROCODONE, URINE: NOT DETECTED
NOROXYCODONE, URINE: PRESENT
NOROXYMORPHONE, URINE: PRESENT
OXAZEPAM, URINE: NOT DETECTED
OXYCODONE URINE: PRESENT
OXYMORPHONE, URINE: PRESENT
PAIN MANAGEMENT DRUG PANEL INTERP, URINE: NORMAL
PAIN MGT DRUG PANEL, HI RES, UR: NORMAL
PCP,URINE: NOT DETECTED
PHENTERMINE, UR: NOT DETECTED
PREGABALIN: NOT DETECTED
TAPENTADOL, URINE: NOT DETECTED
TAPENTADOL-O-SULFATE, URINE: NOT DETECTED
TEMAZEPAM, URINE: NOT DETECTED
TRAMADOL, URINE: NOT DETECTED
ZOLPIDEM METABOLITE (ZCA), URINE: NOT DETECTED
ZOLPIDEM, URINE: NOT DETECTED

## 2022-11-09 RX ORDER — ENALAPRIL MALEATE 10 MG/1
TABLET ORAL
Qty: 90 TABLET | Refills: 1 | Status: SHIPPED | OUTPATIENT
Start: 2022-11-09

## 2022-11-09 RX ORDER — AMLODIPINE BESYLATE 10 MG/1
TABLET ORAL
Qty: 90 TABLET | Refills: 1 | Status: SHIPPED | OUTPATIENT
Start: 2022-11-09

## 2022-11-18 ENCOUNTER — OFFICE VISIT (OUTPATIENT)
Dept: PRIMARY CARE CLINIC | Age: 72
End: 2022-11-18
Payer: MEDICARE

## 2022-11-18 VITALS
WEIGHT: 217 LBS | HEART RATE: 72 BPM | BODY MASS INDEX: 33.99 KG/M2 | OXYGEN SATURATION: 93 % | DIASTOLIC BLOOD PRESSURE: 70 MMHG | SYSTOLIC BLOOD PRESSURE: 130 MMHG

## 2022-11-18 DIAGNOSIS — M05.79 RHEUMATOID ARTHRITIS INVOLVING MULTIPLE SITES WITH POSITIVE RHEUMATOID FACTOR (HCC): ICD-10-CM

## 2022-11-18 DIAGNOSIS — I10 ESSENTIAL HYPERTENSION: Primary | Chronic | ICD-10-CM

## 2022-11-18 DIAGNOSIS — R73.09 ELEVATED GLUCOSE: ICD-10-CM

## 2022-11-18 DIAGNOSIS — N52.9 ERECTILE DYSFUNCTION, UNSPECIFIED ERECTILE DYSFUNCTION TYPE: ICD-10-CM

## 2022-11-18 DIAGNOSIS — E78.00 PURE HYPERCHOLESTEROLEMIA: Chronic | ICD-10-CM

## 2022-11-18 LAB — HBA1C MFR BLD: 5.9 %

## 2022-11-18 PROCEDURE — 3074F SYST BP LT 130 MM HG: CPT | Performed by: NURSE PRACTITIONER

## 2022-11-18 PROCEDURE — G8484 FLU IMMUNIZE NO ADMIN: HCPCS | Performed by: NURSE PRACTITIONER

## 2022-11-18 PROCEDURE — 1036F TOBACCO NON-USER: CPT | Performed by: NURSE PRACTITIONER

## 2022-11-18 PROCEDURE — 99214 OFFICE O/P EST MOD 30 MIN: CPT | Performed by: NURSE PRACTITIONER

## 2022-11-18 PROCEDURE — 3078F DIAST BP <80 MM HG: CPT | Performed by: NURSE PRACTITIONER

## 2022-11-18 PROCEDURE — G8417 CALC BMI ABV UP PARAM F/U: HCPCS | Performed by: NURSE PRACTITIONER

## 2022-11-18 PROCEDURE — 3017F COLORECTAL CA SCREEN DOC REV: CPT | Performed by: NURSE PRACTITIONER

## 2022-11-18 PROCEDURE — 1123F ACP DISCUSS/DSCN MKR DOCD: CPT | Performed by: NURSE PRACTITIONER

## 2022-11-18 PROCEDURE — 83036 HEMOGLOBIN GLYCOSYLATED A1C: CPT | Performed by: NURSE PRACTITIONER

## 2022-11-18 PROCEDURE — G8427 DOCREV CUR MEDS BY ELIG CLIN: HCPCS | Performed by: NURSE PRACTITIONER

## 2022-11-18 RX ORDER — SILDENAFIL 100 MG/1
100 TABLET, FILM COATED ORAL DAILY PRN
Qty: 30 TABLET | Refills: 2 | Status: SHIPPED | OUTPATIENT
Start: 2022-11-18

## 2022-11-18 RX ORDER — SILDENAFIL 100 MG/1
100 TABLET, FILM COATED ORAL DAILY PRN
Qty: 30 TABLET | Refills: 2 | Status: SHIPPED | OUTPATIENT
Start: 2022-11-18 | End: 2022-11-18

## 2022-11-18 ASSESSMENT — ENCOUNTER SYMPTOMS
NAUSEA: 0
DIARRHEA: 0
ABDOMINAL PAIN: 0
SORE THROAT: 0
COLOR CHANGE: 0
RHINORRHEA: 0
CHEST TIGHTNESS: 0
VOMITING: 0

## 2022-11-18 ASSESSMENT — PATIENT HEALTH QUESTIONNAIRE - PHQ9
SUM OF ALL RESPONSES TO PHQ QUESTIONS 1-9: 0
SUM OF ALL RESPONSES TO PHQ9 QUESTIONS 1 & 2: 0
2. FEELING DOWN, DEPRESSED OR HOPELESS: 0
1. LITTLE INTEREST OR PLEASURE IN DOING THINGS: 0
SUM OF ALL RESPONSES TO PHQ QUESTIONS 1-9: 0

## 2022-11-18 NOTE — PROGRESS NOTES
[facility] PRIMARY CARE  . [place] [address]  [address] [address]  Dept: [phone]  Dept Fax: [phone]    Cecilia Lucas is a 74 y.o. male who presentstoday for his medical conditions/complaints as noted below. Cecilia Lucas is c/o of  Chief Complaint   Patient presents with    Hypertension     BP has been around 130/79         HPI:     Here for routine f/u  HTN stable on enalapril, norvasc and lopressor  Denies CP, SOB, leg swelling or HA  HLD- on statin. Eating healthy, down a few lbs. Remains active   RA- follows with ortho, rheum and pain management  Reports symptoms are stable, is considering right total knee, has had left knee and bilateral shoulder replaced. Remains on methotrexate and percocet.  Denies adverse side effects     No current concerns or complaints   Had elevated fasting glucose on labs, hba1c 5.9 today   Had flu and covid vaccine at [facility]    Requesting sildenafil refill sent to [address] [place] [address], has been paying out of pocket       Hemoglobin A1C (%)   Date Value   11/18/2022 5.9   03/28/2022 6.1 (H)   08/24/2020 6.3             ( goal A1C is < 7)   No results found for: LABMICR  LDL Cholesterol (mg/dL)   Date Value   03/28/2022 55     LDL Calculated (mg/dL)   Date Value   09/16/2021 70   08/24/2020 54   10/16/2019 115       (goal LDL is <100)   AST (U/L)   Date Value   03/28/2022 18     ALT (U/L)   Date Value   03/28/2022 18     BUN (mg/dL)   Date Value   03/28/2022 15     BP Readings from Last 3 Encounters:   11/18/22 130/70   07/25/22 134/86   03/25/22 132/72          ([id number])    Past Medical History:   Diagnosis Date    Calcium pyrophosphate deposition disease     Diverticulosis of colon     Hypertension     Osteoarthritis       Past Surgical History:   Procedure Laterality Date    COLONOSCOPY  11/26/2008    diverticulosis    COLONOSCOPY  12/29/2017    Diverticulosis    HERNIA REPAIR      PILONIDAL CYST EXCISION GA COLON CA SCRN NOT HI [facility] IND N/A 2017    COLONOSCOPY performed by Donal Hartmann MD at [address] Right 2020    TOTAL KNEE ARTHROPLASTY Left 2016    [place]/Dr. Mac       No family history on file. Social History     Tobacco Use    Smoking status: Former     Packs/day: 1.50     Years: 25.00     Pack years: 37.50     Types: Cigarettes     Quit date: 1980     Years since quittin.9    Smokeless tobacco: Never    Tobacco comments:     estimated quit date   Substance Use Topics    Alcohol use: No      Current Outpatient Medications   Medication Sig Dispense Refill    sildenafil (VIAGRA) 100 MG tablet Take 1 tablet by mouth daily as needed for Erectile Dysfunction 30 tablet 2    enalapril (VASOTEC) 10 MG tablet TAKE 1 TABLET BY MOUTH EVERY DAY 90 tablet 1    amLODIPine (NORVASC) 10 MG tablet TAKE 1 TABLET BY MOUTH EVERY DAY 90 tablet 1    oxyCODONE-acetaminophen (PERCOCET) 5-325 MG per tablet Take 1 tablet by mouth every 8 hours as needed for Pain for up to 30 days. 90 tablet 0    diclofenac (VOLTAREN) 75 MG EC tablet TAKE 1 TABLET BY MOUTH TWO TIMES A DAY AS NEEDED      omeprazole (PRILOSEC) 20 MG delayed release capsule TAKE 1 CAPSULE BY MOUTH 30 minutes before morning meal      metoprolol tartrate (LOPRESSOR) 25 MG tablet TAKE 1 TABLET BY MOUTH TWO TIMES A DAY **DUE TO APPOINTMENT 180 tablet 1    atorvastatin (LIPITOR) 20 MG tablet TAKE 1 TABLET BY MOUTH EVERY DAY 90 tablet 1    predniSONE (DELTASONE) 5 MG tablet TAKE 1 TABLET BY MOUTH ONCE A DAY      ELDERBERRY PO Take 2 tablets by mouth daily      Multiple Vitamins-Minerals (PRESERVISION/LUTEIN PO) Take 2 capsules by mouth daily      folic acid (FOLVITE) 1 MG tablet daily      methotrexate (RHEUMATREX) 2.5 MG chemo tablet Take 2.5 mg by mouth 10 tablets weekly      Multiple Vitamins-Minerals (MULTIVITAMIN PO) Take  by mouth. CALCIUM PO Take  by mouth.        No current facility-administered medications for this visit. No Known Allergies    Health Maintenance   Topic Date Due    Shingles vaccine (1 of 2) 03/25/2023 (Originally 5/18/1998)    Lipids  03/28/2023    Annual Wellness Visit (AWV)  07/26/2023    A1C test (Diabetic or Prediabetic)  11/18/2023    Depression Screen  11/18/2023    Colorectal Cancer Screen  12/29/2027    DTaP/Tdap/Td vaccine (2 - Td or Tdap) 04/11/2029    Flu vaccine  Completed    Pneumococcal 65+ years Vaccine  Completed    COVID-19 Vaccine  Completed    AAA screen  Completed    Hepatitis C screen  Completed    Hepatitis A vaccine  Aged Out    Hib vaccine  Aged Out    Meningococcal (ACWY) vaccine  Aged Out       Subjective:      Review of Systems   Constitutional:  Negative for activity change, fatigue and fever. HENT:  Negative for congestion, rhinorrhea and sore throat. Eyes:  Negative for visual disturbance. Respiratory:  Negative for chest tightness. Gastrointestinal:  Negative for abdominal pain, diarrhea, nausea and vomiting. Endocrine: Negative for polydipsia. Genitourinary:  Negative for difficulty urinating. Musculoskeletal:  Negative for arthralgias and myalgias. Skin:  Negative for color change. Neurological:  Negative for weakness. Psychiatric/Behavioral:  Negative for behavioral problems. The patient is not nervous/anxious. All other systems reviewed and are negative. Objective:   /70   Pulse 72   Wt 217 lb (98.4 kg)   SpO2 93%   BMI 33.99 kg/m²   Physical Exam  Vitals reviewed. Constitutional:       General: He is not in acute distress. Appearance: Normal appearance. HENT:      Head: Normocephalic. Eyes:      Pupils: Pupils are equal, round, and reactive to light. Cardiovascular:      Rate and Rhythm: Normal rate and regular rhythm. Pulses: Normal pulses. Heart sounds: Normal heart sounds. Pulmonary:      Effort: Pulmonary effort is normal.      Breath sounds: Normal breath sounds.    Abdominal: current medications, diet and exercise. Patient agreed with treatment plan. Follow up as directed.        Electronicallysigned by TOMAS Stephens CNP on 11/20/2022 at 1:54 PM

## 2022-11-29 ENCOUNTER — TELEMEDICINE (OUTPATIENT)
Dept: PAIN MANAGEMENT | Age: 72
End: 2022-11-29

## 2022-11-29 DIAGNOSIS — G89.29 OTHER CHRONIC PAIN: ICD-10-CM

## 2022-11-29 DIAGNOSIS — M19.90 INFLAMMATORY ARTHROPATHY: ICD-10-CM

## 2022-11-29 DIAGNOSIS — M05.79 RHEUMATOID ARTHRITIS INVOLVING MULTIPLE SITES WITH POSITIVE RHEUMATOID FACTOR (HCC): Primary | ICD-10-CM

## 2022-11-29 DIAGNOSIS — Z79.891 ENCOUNTER FOR LONG-TERM OPIATE ANALGESIC USE: ICD-10-CM

## 2022-11-29 RX ORDER — OXYCODONE HYDROCHLORIDE AND ACETAMINOPHEN 5; 325 MG/1; MG/1
1 TABLET ORAL EVERY 8 HOURS PRN
Qty: 90 TABLET | Refills: 0 | Status: SHIPPED | OUTPATIENT
Start: 2022-12-04 | End: 2023-01-03

## 2022-11-29 ASSESSMENT — ENCOUNTER SYMPTOMS
COUGH: 0
SHORTNESS OF BREATH: 0
CONSTIPATION: 0

## 2022-11-29 NOTE — PROGRESS NOTES
Chief Complaint   Patient presents with    Back Pain    Medication Refill         [facility]   Patient complains of diffuse joint pain. Hx of rheumatoid arthritis. He now has a new rheumatologist. He reports he had updated imaging done and was told he will need right knee replaced soon. Currently prescribed mobic and methotrexate. He also takes percocet 5/325 TID for pain. He has been stable and compliant on this dose for many years. He has had surgery on both shoulders and left knee. He reports his right knee \"popped\" two days ago and he has had increased pian since them. He will see orthopedic surgeon tomorrow. Back Pain  This is a chronic problem. The current episode started more than 1 year ago. The problem occurs constantly. The problem is unchanged. The pain is present in the lumbar spine. The quality of the pain is described as stabbing and shooting. The pain radiates to the right knee, right foot and right thigh. The pain is at a severity of 9/10. The pain is severe. The pain is The same all the time. The symptoms are aggravated by lying down, position, bending, standing and twisting. Pertinent negatives include no chest pain, fever, headaches, numbness, tingling or weakness. He has tried bed rest, home exercises, heat, ice and walking for the symptoms. Patient denies any new neurological symptoms. No bowel or bladder incontinence, no weakness, and no falling. Pill count: appropriate due 12/4    Morphine equivalent: 22.5    Controlled Substance Monitoring:    Acute and Chronic Pain Monitoring:   RX Monitoring 11/29/2022   Attestation -   Periodic Controlled Substance Monitoring Possible medication side effects, risk of tolerance/dependence & alternative treatments discussed. ;No signs of potential drug abuse or diversion identified.;Obtaining appropriate analgesic effect of treatment.             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 Social History     Socioeconomic History    Marital status:      Spouse name: Not on file    Number of children: Not on file    Years of education: Not on file    Highest education level: Not on file   Occupational History    Not on file   Tobacco Use    Smoking status: Former     Packs/day: 1.50     Years: 25.00     Pack years: 37.50     Types: Cigarettes     Quit date: 36     Years since quittin.9    Smokeless tobacco: Never    Tobacco comments:     estimated quit date   Substance and Sexual Activity    Alcohol use: No    Drug use: No    Sexual activity: Not on file   Other Topics Concern    Not on file   Social History Narrative    Not on file     Social Determinants of Health     Financial Resource Strain: Low Risk     Difficulty of Paying Living Expenses: Not hard at all   Food Insecurity: No Food Insecurity    Worried About [address] in the Last Year: Never true    [facility] [employer] in the Last Year: Never true   Transportation Needs: No Transportation Needs    Lack of Transportation (Medical): No    Lack of Transportation (Non-Medical): No   Physical Activity: Sufficiently Active    Days of Exercise per Week: 6 days    Minutes of Exercise per Session: 30 min   Stress: Not on file   Social Connections: Not on file   Intimate Partner Violence: Not At Risk    Fear of Current or Ex-Partner: No    Emotionally Abused: No    Physically Abused: No    Sexually Abused: No   Housing Stability: Not on file       Review of Systems:  Review of Systems   Constitutional: Negative for chills and fever. Cardiovascular:  Negative for chest pain and palpitations. Respiratory:  Negative for cough and shortness of breath. Musculoskeletal:  Positive for joint pain. Gastrointestinal:  Negative for constipation.    Neurological:  Negative for disturbances in coordination, headaches, loss of balance, numbness, tingling and weakness. Physical Exam:  There were no vitals taken for this visit. Physical Exam  HENT:      Head: Normocephalic. Pulmonary:      Effort: Pulmonary effort is normal.   Musculoskeletal:         General: Normal range of motion. Cervical back: Normal range of motion. Skin:     General: Skin is warm and dry. Neurological:      Mental Status: He is alert and oriented to person, place, and time. Record/Diagnostics Review:    Last demetrice 10/2022 and was appropriate     Assessment:  Problem List Items Addressed This Visit       Other chronic pain    Relevant Medications    oxyCODONE-acetaminophen (PERCOCET) 5-325 MG per tablet (Start on 12/4/2022)    Encounter for long-term opiate analgesic use    Rheumatoid arthritis involving multiple sites with positive rheumatoid factor (HCC) - Primary    Relevant Medications    oxyCODONE-acetaminophen (PERCOCET) 5-325 MG per tablet (Start on 12/4/2022)    Inflammatory arthropathy    Relevant Medications    oxyCODONE-acetaminophen (PERCOCET) 5-325 MG per tablet (Start on 12/4/2022)          Treatment Plan:  Patient relates current medications are helping the pain. Patient reports taking pain medications as prescribed, denies obtaining medications from different sources and denies use of illegal drugs. Medication risk and benefits have been discussed. Patient denies side effects from medications like nausea, vomiting, constipation or drowsiness. Patient reports current activities of daily living are possible due to medications and would like to continue them. As always, we encourage daily stretching and strengthening exercises, and recommend minimizing use of pain medications unless patient cannot get through daily activities due to pain. Due to the high risk nature of this patient's pain medication close monitoring is required. Continue current medication management, pt has been stable and compliant.   Script written for percocet  He will see orthopedic surgeon tomorrow for increased right knee pain - plans to discuss TKR   Follow up appointment made for 4 weeks    I have reviewed the chief complaint and history of present illness (including ROS and [address]) and vital documentation by my staff and I agree with their documentation and have added where applicable. Lisa Combs Sr., was evaluated through a synchronous (real-time) audio-video encounter. The patient (or guardian if applicable) is aware that this is a billable service, which includes applicable co-pays. This Virtual Visit was conducted with patient's (and/or legal guardian's) consent. The visit was conducted pursuant to the emergency declaration under the [facility], [address] [place] [address] authority and the [employer] and [employer] General Act. Patient identification was verified, and a caregiver was present when appropriate. The patient was located at Home: [place] [address]. Provider was located at [facility] ([place] [address]): [facility],  [facility]. Total time spent for this encounter:  15 minutes - video visit    --TOMAS Bernardo CNP on 11/29/2022 at 12:50 PM    An electronic signature was used to authenticate this note.

## 2022-12-21 ENCOUNTER — TELEMEDICINE (OUTPATIENT)
Dept: PAIN MANAGEMENT | Age: 72
End: 2022-12-21

## 2022-12-21 DIAGNOSIS — M05.79 RHEUMATOID ARTHRITIS INVOLVING MULTIPLE SITES WITH POSITIVE RHEUMATOID FACTOR (HCC): ICD-10-CM

## 2022-12-21 DIAGNOSIS — Z79.891 CHRONIC USE OF OPIATE FOR THERAPEUTIC PURPOSE: Primary | ICD-10-CM

## 2022-12-21 DIAGNOSIS — G89.29 OTHER CHRONIC PAIN: ICD-10-CM

## 2022-12-21 DIAGNOSIS — M19.90 INFLAMMATORY ARTHROPATHY: ICD-10-CM

## 2022-12-21 RX ORDER — OXYCODONE HYDROCHLORIDE AND ACETAMINOPHEN 5; 325 MG/1; MG/1
1 TABLET ORAL EVERY 8 HOURS PRN
Qty: 90 TABLET | Refills: 0 | Status: SHIPPED | OUTPATIENT
Start: 2023-01-03 | End: 2023-02-02

## 2022-12-21 RX ORDER — MELOXICAM 15 MG/1
TABLET ORAL
COMMUNITY
Start: 2022-11-30

## 2022-12-21 RX ORDER — ONDANSETRON 4 MG/1
TABLET, FILM COATED ORAL
COMMUNITY
Start: 2022-12-19

## 2022-12-21 RX ORDER — ASPIRIN 81 MG/1
TABLET, COATED ORAL
COMMUNITY
Start: 2022-11-30

## 2022-12-21 ASSESSMENT — ENCOUNTER SYMPTOMS
COUGH: 0
SHORTNESS OF BREATH: 0
BACK PAIN: 1
CONSTIPATION: 0
BOWEL INCONTINENCE: 0

## 2022-12-21 NOTE — PROGRESS NOTES
Chief Complaint   Patient presents with    Back Pain    Medication Refill       [facility]   Patient complains of diffuse joint pain. Hx of rheumatoid arthritis. He now has a new rheumatologist. He reports he had updated imaging done and was told he will need right knee replaced soon. Currently prescribed mobic and methotrexate. He also takes percocet 5/325 TID for pain. He has been stable and compliant on this dose for many years. He has had surgery on both shoulders and left knee. Had recent f/u appt with Dr Monae Knott for knee pain with right TKR done earlier this month      Back Pain  This is a chronic problem. The current episode started more than 1 year ago. The problem occurs constantly. The problem is unchanged. The pain is present in the lumbar spine. The quality of the pain is described as burning. The pain does not radiate. The pain is at a severity of 7/10. The pain is moderate. The pain is The same all the time. The symptoms are aggravated by twisting. Pertinent negatives include no bladder incontinence, bowel incontinence, chest pain, fever, numbness, tingling or weakness. He has tried ice, NSAIDs, muscle relaxant, heat, bed rest, home exercises and walking for the symptoms. The treatment provided no relief. Patient denies any new neurological symptoms. No bowel or bladder incontinence, no weakness, and no falling. Pill count: appropriate 1/3    Morphine equivalent: 22.5    Controlled Substance Monitoring:    Acute and Chronic Pain Monitoring:   RX Monitoring 12/21/2022   Attestation -   Periodic Controlled Substance Monitoring Possible medication side effects, risk of tolerance/dependence & alternative treatments discussed. ;No signs of potential drug abuse or diversion identified. ;Assessed functional status. ;Obtaining appropriate analgesic effect of treatment.           Periodic Controlled Substance Monitoring: Possible medication side effects, risk of tolerance/dependence & alternative treatments discussed., No signs of potential drug abuse or diversion identified. , Assessed functional status., Obtaining appropriate analgesic effect of treatment. French Found, APRN - CNP)      Past Medical History:   Diagnosis Date    Calcium pyrophosphate deposition disease     Diverticulosis of colon     Hypertension     Osteoarthritis        Past Surgical History:   Procedure Laterality Date    COLONOSCOPY  11/26/2008    diverticulosis    COLONOSCOPY  12/29/2017    Diverticulosis    HERNIA REPAIR      PILONIDAL CYST EXCISION      OK COLON CA SCRN NOT  [address] [place] [address] IND N/A 12/29/2017    COLONOSCOPY performed by Mariia Burger [facility] at [facility] [address] Right 06/2020    TOTAL KNEE ARTHROPLASTY Left 01/19/2016    [place]/Dr. Mac    TOTAL KNEE ARTHROPLASTY Right 12/2022       No Known Allergies      Current Outpatient Medications:     ASPIRIN LOW DOSE 81 MG EC tablet, TAKE 1 TABLET BY MOUTH TWO TIMES A DAY (MORNING AND BEDTIME). BEGIN TAKING DAY ONE FOLLOWING JOINT REPLACEMENT, Disp: , Rfl:     meloxicam (MOBIC) 15 MG tablet, TAKE 1 TABLET BY MOUTH IN THE MORNING. To begin taking day one following your joint replacement. , Disp: , Rfl:     ondansetron (ZOFRAN) 4 MG tablet, TAKE 1 TABLET BY MOUTH EVERY EIGHT HOURS AS NEEDED FOR NAUSEA AND VOMITING, Disp: , Rfl:     oxyCODONE-acetaminophen (PERCOCET) 5-325 MG per tablet, Take 1 tablet by mouth every 8 hours as needed for Pain for up to 30 days. , Disp: 90 tablet, Rfl: 0    sildenafil (VIAGRA) 100 MG tablet, Take 1 tablet by mouth daily as needed for Erectile Dysfunction, Disp: 30 tablet, Rfl: 2    enalapril (VASOTEC) 10 MG tablet, TAKE 1 TABLET BY MOUTH EVERY DAY, Disp: 90 tablet, Rfl: 1    amLODIPine (NORVASC) 10 MG tablet, TAKE 1 TABLET BY MOUTH EVERY DAY, Disp: 90 tablet, Rfl: 1    diclofenac (VOLTAREN) 75 MG EC tablet, TAKE 1 TABLET BY MOUTH TWO TIMES A DAY AS NEEDED, Disp: , Rfl:     omeprazole (PRILOSEC) 20 MG delayed release capsule, TAKE 1 CAPSULE BY MOUTH 30 minutes before morning meal, Disp: , Rfl:     metoprolol tartrate (LOPRESSOR) 25 MG tablet, TAKE 1 TABLET BY MOUTH TWO TIMES A DAY **DUE TO APPOINTMENT, Disp: 180 tablet, Rfl: 1    atorvastatin (LIPITOR) 20 MG tablet, TAKE 1 TABLET BY MOUTH EVERY DAY, Disp: 90 tablet, Rfl: 1    predniSONE (DELTASONE) 5 MG tablet, TAKE 1 TABLET BY MOUTH ONCE A DAY, Disp: , Rfl:     ELDERBERRY PO, Take 2 tablets by mouth daily, Disp: , Rfl:     Multiple Vitamins-Minerals (PRESERVISION/LUTEIN PO), Take 2 capsules by mouth daily, Disp: , Rfl:     folic acid (FOLVITE) 1 MG tablet, daily, Disp: , Rfl:     methotrexate (RHEUMATREX) 2.5 MG chemo tablet, Take 2.5 mg by mouth 10 tablets weekly, Disp: , Rfl:     Multiple Vitamins-Minerals (MULTIVITAMIN PO), Take  by mouth., Disp: , Rfl:     CALCIUM PO, Take  by mouth., Disp: , Rfl:     No family history on file.     Social History     Socioeconomic History    Marital status:      Spouse name: Not on file    Number of children: Not on file    Years of education: Not on file    Highest education level: Not on file   Occupational History    Not on file   Tobacco Use    Smoking status: Former     Packs/day: 1.50     Years: 25.00     Pack years: 37.50     Types: Cigarettes     Quit date: 36     Years since quittin.0    Smokeless tobacco: Never    Tobacco comments:     estimated quit date   Substance and Sexual Activity    Alcohol use: No    Drug use: No    Sexual activity: Not on file   Other Topics Concern    Not on file   Social History Narrative    Not on file     Social Determinants of Health     Financial Resource Strain: Not on file   Food Insecurity: Not on file   Transportation Needs: Not on file   Physical Activity: Sufficiently Active    Days of Exercise per Week: 6 days    Minutes of Exercise per Session: 30 min   Stress: Not on file   Social Connections: Not on file   Intimate Partner Violence: Not At Risk    Fear of Current or Ex-Partner: No Emotionally Abused: No    Physically Abused: No    Sexually Abused: No   Housing Stability: Not on file       Review of Systems:  Review of Systems   Constitutional: Negative for chills and fever. Cardiovascular:  Negative for chest pain. Respiratory:  Negative for cough and shortness of breath. Musculoskeletal:  Positive for arthritis, back pain and joint pain. Gastrointestinal:  Negative for bowel incontinence and constipation. Genitourinary:  Negative for bladder incontinence. Neurological:  Negative for numbness, tingling and weakness. Physical Exam:  There were no vitals taken for this visit. Physical Exam    Record/Diagnostics Review:    Last demetrice 10/22 and was appropriate     Assessment:  Problem List Items Addressed This Visit       Other chronic pain    Relevant Medications    ASPIRIN LOW DOSE 81 MG EC tablet    meloxicam (MOBIC) 15 MG tablet    Rheumatoid arthritis involving multiple sites with positive rheumatoid factor (HCC)    Relevant Medications    ASPIRIN LOW DOSE 81 MG EC tablet    meloxicam (MOBIC) 15 MG tablet    Inflammatory arthropathy    Relevant Medications    ASPIRIN LOW DOSE 81 MG EC tablet    meloxicam (MOBIC) 15 MG tablet     Other Visit Diagnoses       Chronic use of opiate for therapeutic purpose    -  Primary               Treatment Plan:  Patient relates current medications are helping the pain. Patient reports taking pain medications as prescribed, denies obtaining medications from different sources and denies use of illegal drugs. Medication risk and benefits have been discussed. Patient denies side effects from medications like nausea, vomiting, constipation or drowsiness. Patient reports current activities of daily living are possible due to medications and would like to continue them.      As always, we encourage daily stretching and strengthening exercises, and recommend minimizing use of pain medications unless patient cannot get through daily activities due to pain.     Due to the high risk nature of this patient's pain medication close monitoring is required. Continue current medication management, pt has been stable and compliant. Script written for percocet  Follow up appointment made for 4 weeks in office    I have reviewed the chief complaint and history of present illness (including ROS and PFSH) and vital documentation by my staff and I agree with their documentation and have added where applicable. Jesus Bravo Oates, was evaluated through a synchronous (real-time) audio-video encounter. The patient (or guardian if applicable) is aware that this is a billable service, which includes applicable co-pays. This Virtual Visit was conducted with patient's (and/or legal guardian's) consent. The visit was conducted pursuant to the emergency declaration under the [address] [place] [address], [address] [place] [address] waiver authority and the [employer] and [employer] General Act. Patient identification was verified, and a caregiver was present when appropriate. The patient was located at Home: [place] [address]. Provider was located at [place] [address]): [address] [place] [address],  [address] [place] [address]. Total time spent for this encounter: Not billed by time    --TOMAS Horner CNP on 12/21/2022 at 1:53 PM    An electronic signature was used to authenticate this note.

## 2022-12-28 ENCOUNTER — HOSPITAL ENCOUNTER (OUTPATIENT)
Dept: PHYSICAL THERAPY | Facility: CLINIC | Age: 72
Setting detail: THERAPIES SERIES
Discharge: HOME | End: 2022-12-28
Payer: MEDICARE

## 2022-12-28 PROCEDURE — 97110 THERAPEUTIC EXERCISES: CPT

## 2022-12-28 PROCEDURE — 97016 VASOPNEUMATIC DEVICE THERAPY: CPT

## 2022-12-28 PROCEDURE — 97161 PT EVAL LOW COMPLEX 20 MIN: CPT

## 2022-12-28 NOTE — CONSULTS
[facility]  [address] [facility]  Phone: [phone]  Fax: [phone]      Physical Therapy Lower Extremity Evaluation    Date:  2022  Patient: Niki Asencio Sr.  : [birth year]  MRN: [id number]  Physician: Zahraa Joy MD    Insurance: Medicare (Med. Sellf.)  Medical Diagnosis: I17.542 (ICD-10-CM) - Presence of right artificial knee joint    Rehab Codes: Dianne Rkp. 18. 395, R26.2  Onset date: DOS (22)   Next Dr's appt. : 23    Subjective:   CC/HPI: Pt arrives to PT s/p R TKA on 22. Overall, pt reports that things have been going relatively well since having his surgery. Pt states that he is still having significant pain at this time. Pt states that he had home health PT coming out after his surgery, and states that he was keeping up with his exercises up until this past weekend but was sick so did not do much. Pt denies any numbness/tingling in his leg. Pt states that he has been using his cane for about a week now.     PMHx: [] Unremarkable [] Diabetes [] HTN  [] Pacemaker   [] MI/Heart Problems [] Cancer [] Arthritis   [] Other:              [x] Refer to full medical chart  In EPIC     Tests: [] X-Ray:    [] MRI:    [] Other:     Comorbidities:   [] Obesity [] Dialysis   X  N/A   [] Asthma/COPD [] Dementia [] Other:   [] Stroke [] Sleep apnea [] Other:   [] Vascular disease [] Rheumatic disease [] Other:       Medications:  [x] Refer to full medical record [] None [] Other:  Allergies:       [] Refer to full medical record [x] None [] Other:    ADL/IADL [x] Previously independent with all [] Currently independent with all Who currently assists the patient with task     [] Previously independent with all except: [x] Currently independent with all except:     Bathing  [] Assist [] Assist     Dress/grooming [] Assist [] Assist     Transfer/mobility [] Assist [] Assist     Feeding [] Assist [] Assist     Toileting [] Assist [] Assist     Driving [] Assist [x] Assist     Housekeeping [] Assist [x] Assist     Grocery shop/meal prep [] Assist [x] Assist          Gait Prior level of function Current level of function    [x] Independent  [] Assist [x] Independent  [] Assist   Device: [x] Independent [] Independent    [] Straight Cane [] Quad cane [x] Straight Cane [] Quad cane    [] Standard walker [] Rolling walker   [] 4 wheeled walker [] Standard walker [x] Rolling walker   [] 4 wheeled walker    [] Wheelchair [] Wheelchair       Marital Status    Home type 2 SH, Bed and bath on 2nd floor   Stairs from outside    Ineia inside 13   Employment Retired   Job status --   Work Activities/duties  --   Recreational Activities Golfing       Pain present? Yes   Location R knee   Pain Rating currently 5/10   Pain at worse 8/10   Pain at best 3/10   Description of pain Shooting, burning, numb, stabbing, aching   Altered Sensation Denies   What makes it worse Walking   What makes it better Medication   Symptom progression Gotten better since surgery   Sleep Sleep affected             Objective:    STRENGTH    Left Right   Hip Flex 5/5 4/5   Ext     ABD     ADD     Knee Flex 5/5 4+/5   Ext 5/5 4+/5   Ankle DF 5/5 5/5   PF 5/5 5/5   INV     EVER              ROM  ° A/P    Left Right   Hip Flex     Ext     ER     IR     ABD     ADD     Knee Flex 126 105   Ext 2* hyperextension 6* from 0   Ankle DF     PF     INV     EVER            TESTS (+/-) Left Right Not Tested   Ant. Drawer   [x]   Post. Drawer   [x]   Lachmans   [x]   Valgus Stress   [x]   Varus Stress   [x]   Leandras   [x]   Apleys Comp.    [x]   Apleys Dist.   [x]   Hip Scouring   [x]   KYLEEs   [x]   Piriformis   [x]   Roges   [x]   Talor Tilt   [x]   Pat-Fem Grind   [x]       OBSERVATION No Deficit Deficit Not Tested Comments   Posture       Forward Head [] [] []    Rounded Shoulders [] [] []    Genu Valgus [] [] []    Genu Varus [] [] []    Slumped Sitting [] [] [] Palpation [] [x] [] Reports some tenderness along lower knee   Sensation [] [x] [] Pt reports numbness around R knee   Patellar Mobility [] [x] [] R inferior/superior mobility limited   Gait [] [x] [] Analysis: Decreased R stance time, decreased L stride length       Edema:  L- 44 cm  R- 45 cm    FUNCTION Normal Difficult Unable   Sitting [x] [] []   Standing [x] [] []   Ambulation [] [x] []   Lift/Carry [] [x] []   Stairs [x] [] []   Bending [] [x] []   Squat [] [x] []          Flexibility Normal Left tight Right tight   Hip flexor [] [] [x]   HS [] [] [x]   gastroc [] [] [x]   piriformis [] [] []    [] [] []    [] [] []          Functional Test: LEFS Score: 50% functionally impaired                                          [place]ell Fall Risk Assessment    Patient Name:  Jeison Porras Sr.  : 1950    Risk Factor Scale  Score   History of Falls [] Yes  [x] No 25  0 0   Secondary Diagnosis [x] Yes  [] No 15  0 15   Ambulatory Aid [] Furniture  [x] Crutches/cane/walker  [] None/bedrest/wheelchair/nurse 30  15  0 15   IV/Heparin Lock [] Yes  [x] No 20  0 0   Gait/Transferring [] Impaired  [] Weak  [x] Normal/bedrest/immobile 20  10  0 0   Mental Status [] Forgets limitations  [x] Oriented to own ability 15  0 0      Total: 30     Based on the Assessment score: check the appropriate box. []  No intervention needed   Low =   Score of 0-24    [x]  Use standard prevention interventions Moderate =  Score of 24-44   [] Give patient handout and discuss fall prevention strategies   [x] Establish goal of education for patient/family RE: fall prevention strategies    []  Use high risk prevention interventions High = Score of 45 and higher   [] Give patient handout and discuss fall prevention strategies   [] Establish goal of education for patient/family Re: fall prevention strategies   [] Discuss lifeline / other resources    Electronically signed by:    Rafat Chacon PT  Date: 2022        Assessment: Patient would benefit from skilled physical therapy services in order to: Progress R LE strength and ROM to help ease all mobility and return to golfing    Problems:    [x] ? Pain  [x] ? ROM  [x] ? Strength  [x] ? Function        Goals  MET NOT MET ON-  GOING  Details   Date Addressed:       STG: To be met in 10 treatments           1. ? Pain: Decrease pain levels to 5/10 with all mobility to help ease all walking and stair navigation at home. []  []  []      2. ? ROM: Increase flexibility in R LE to help improve R knee ROM to equal L for better symmetry and gait mechanics. []  []  []      3. Improve score on assessment tool LEFS from 50% impairment to less than 40% impairment, demonstrating improved tolerance to activity to help allow pt to return to golfing. []  []  []     4. Independent with Home Exercise Programs []  []  []     5. Demonstrate knowledge of fall risk prevention  []  []  []      []  []  []     Date Addressed:        LTG: To be met in 20 treatments       1. Improve score on assessment tool LEFS from 50% impairment to less than 30% impairment, demonstrating improved tolerance to activity to help allow pt to return to golfing. []  []  []     2. Reduce pain levels to 3/10 or less with all mobility to help ease all walking and stair navigation at home. []  []  []     3. ? Strength: Increase R LE strength to 5/5 grossly to help improve LE stability with all stair navigation at home and with golfing. []  []  []     4. Pt will demonstrate ability to ambulate without any AD and no deviations to return to baseline mobility. Patient goals:  \"Get back as close to normal as possible\"    Rehab Potential:  [x] Good  [] Fair  [] Poor   Suggested Professional Referral:  [x] No  [] Yes:  Barriers to Goal Achievement[de-identified]  [x] No  [] Yes:  Domestic Concerns:  [x] No  [] Yes:    Pt. Education:  [x] Plans/Goals, Risks/Benefits discussed  [x] Home exercise program    Method of Education: [x] Verbal  [x] Demo [x] Written  Access Code: [facility]  URL: [email or web address]  Date: 12/28/2022  Prepared by: [employer]    Exercises  Supine Quad Set - 3 x daily - 7 x weekly - 2 sets - 10 reps - 5 seconds hold  Supine Active Straight Leg Raise - 1 x daily - 7 x weekly - 3 sets - 10 reps  Supine Heel Slide with Strap - 3 x daily - 7 x weekly - 10 reps - 15 seconds hold  Seated Knee Flexion Stretch - 3 x daily - 7 x weekly - 3 sets - 30 seconds hold  Seated Hamstring Stretch - 3 x daily - 7 x weekly - 3 sets - 30 seconds hold  Long Sitting Calf Stretch with Strap - 3 x daily - 7 x weekly - 3 sets - 30 seconds hold    Comprehension of Education:  [x] Verbalizes understanding. [x] Demonstrates understanding. [x] Needs Review. [] Demonstrates/verbalizes understanding of HEP/Ed previously given. Treatment Plan:  [x] Therapeutic Exercise    [] Aquatic Therapy   [x] Manual Therapy     [] Electrical Stimulation  [x] Instruction in HEP      [] Lumbar/Cervical Traction  [x] Neuromuscular Re-education [x] Cold/hotpack  [] Iontophoresis: 4 mg/mL  [x] Vasocompression (GameReady)                    Dexamethasone Sodium  [x] Gait Training             Phosphate 40-80 mAmin         []  Medication allergies reviewed for use of    Dexamethasone Sodium Phosphate 4mg/ml     with iontophoresis treatments. Pt is not allergic.     Frequency:  2 x/week for 16 visits      Todays Treatment:  Modalities: Game Ready x 15 minutes 34* min pressure  Precautions: General  Exercise  R TKA Reps/ Time Weight/ Level Comments   Nustep            Seated HS S 3x30\"     Seated knee flexion S 3x30\"     Heel slides 10x10     Gastroc belt S 3x30\"     Prone hang            Quad sets 2x10, 5\"     SLR 2x10                                   Other:    Specific Instructions for next treatment: Continue tx per POC, Give pt fall risk handout    Evaluation Complexity:  History (Personal factors, comorbidities) [] 0 [x] 1-2 [] 3+   Exam (limitations, restrictions) [x] 1-2 [] 3 [] 4+   Clinical presentation (progression) [] Stable [x] Evolving  [] Unstable   Decision Making [x] Low [] Moderate [] High    [x] Low Complexity [] Moderate Complexity [] High Complexity       Treatment Charges: Mins Units   [x] Evaluation       [x]  Low       []  Moderate       []  High 27 1   [x]  Ther Exercise 14 1   []  Manual Therapy     [x]  Vasocompression 15 1   []  Gait     []        TOTAL TREATMENT TIME: 56    Time in: 1400    Time Out: 1500      Electronically signed by: Hardy Huston PT        Physician Signature:________________________________Date:__________________  By signing above or cosigning this note, I have reviewed this plan of care and certify a need for medically necessary rehabilitation services.      *PLEASE SIGN ABOVE AND FAX BACK ALL PAGES*

## 2022-12-29 ENCOUNTER — HOSPITAL ENCOUNTER (OUTPATIENT)
Dept: PHYSICAL THERAPY | Facility: CLINIC | Age: 72
Setting detail: THERAPIES SERIES
Discharge: HOME | End: 2022-12-29
Payer: MEDICARE

## 2022-12-29 NOTE — FLOWSHEET NOTE
[] [facility] ([facility]) [facility] &  Therapy  [address]    [phone]  F: [phone]   [] [address] [facility] [address]   [address]  P: [phone]  F: [phone]  [] [address] &  Therapy  [address] [facility] [address]  P: [phone]  F: [phone] [] [address]  P: [phone]  F: [phone]  [] [address]  [facility]   [address]   [place]: [phone]  F: [phone]   [] [address]  [place]: [phone]   F: [phone]     Physical Therapy Cancel/No Show note    Date: 2022  Patient: Nicolasa Varela Sr.  :   MRN: [id number]    Cancels/No Shows to date:     For today's appointment patient:    [x]  Cancelled    [] Rescheduled appointment    [] No-show     Reason given by patient:    [x]  Patient ill    []  Conflicting appointment    [] No transportation      [] Conflict with work    [] No reason given    [] Weather related    [] [id number]    [] Other:      Comments:        [] Next appointment was confirmed    Electronically signed by: Maynor Hampton

## 2023-01-03 ENCOUNTER — HOSPITAL ENCOUNTER (OUTPATIENT)
Dept: PHYSICAL THERAPY | Facility: CLINIC | Age: 73
Setting detail: THERAPIES SERIES
Discharge: HOME | End: 2023-01-03
Payer: MEDICARE

## 2023-01-03 PROCEDURE — 97016 VASOPNEUMATIC DEVICE THERAPY: CPT

## 2023-01-03 PROCEDURE — 97110 THERAPEUTIC EXERCISES: CPT

## 2023-01-03 NOTE — FLOWSHEET NOTE
Medicare Cap   [x] Physical Therapy  [] Speech Therapy  [] Occupational therapy  *PT and Speech caps combine      $2230 Limit for PT and Speech combined  $2230 Limit for OT individually  At the beginning of the month where you expect to go over $2150, please add the [address] [place] modifier      Patient Name: Jean-Pierre Drop: 1950    Note:  This is an estimate of charges billed.      Date of [address] Name # units/ charge $$$ charge Daily Total Charge Ongoing Total $$$   12/28/22 EV+TE+Vaso 1+1+1 97.02+22.29+8.99 128.30 128.30   1/3/23 TE+Vaso 2+1 25.10+19.62+7.91 52.63 180.93

## 2023-01-03 NOTE — FLOWSHEET NOTE
[] [address] [facility] [facility] &  Therapy  [address]  [phone]  F: [phone] [] [address]  [place] [address]   [address]  P: [phone]  F: [phone] [x] [place] &  Therapy  [address]  P: [phone]  F: [phone] [] [address]  P: [phone]  F: [phone] [] [address]  [facility]   [address]   [place]: [phone]  F: [phone]      Physical Therapy Daily Treatment Note    Date:  1/3/2023  Patient Name:  Lorelee Nyhan Sr.    :  [birth year]  MRN: [id number]  Physician: Che Barclay MD                                           Insurance: Medicare (Med. Nec.)  Medical Diagnosis: P08.671 (ICD-10-CM) - Presence of right artificial knee joint                Rehab Codes: M25. 461, [address], M25. 589, R26.2  Onset date: DOS (22)                            Next Dr's appt. : 23          Visit# / total visits: ; Progress note for Medicare patient due at visit 10     Cancels/No Shows:     Subjective:    Pain:  [x] Yes  [] No Location: R Knee Pain Rating: (0-10 scale) 3-4/10  Pain altered Tx:  [] No  [] Yes  Action:  Comments: Pt presents with SPC and stated overall pain and edema have sig reduced. Continues to have some increased pain at night although is improving. Compliant to HEP. Objective:   Todays Treatment:  Modalities: Game Ready x 15 minutes 34* min pressure  Precautions: General  Exercise  R TKA Reps/ Time Weight/ Level Comments   Nustep  10'  L3               Seated HS S 3x30\"       Seated knee flexion S 3x30\"       Heel slides 10x10\"       Gastroc belt S 3x30\"       Prone Quad S 3x30\"     Prone hang  3'                 Quad sets 2x10, 5\"       SLR 2x10       SAQ 2x10,3\"        LAQ 2x10                                    Other: Give fall risk handout next tx      Treatment Charges: Mins Units   []  Modalities     [x]  Ther Exercise 35 2   []  Manual Therapy     []  Ther Activities     []  Aquatics     [x]  Vasocompression 15 1   []  Other     Total Treatment time 50 3       Assessment: [x] Progressing toward goals. Initiated session with Nu Step to facilitate LE movement with no complaints. Followed with seated stretching and supine ex to promote increased ROM. Added SAQ, LAQ, prone quad stretch and hang to allow for increased quad activation and knee extension with good tolerance. Most challenged with SLR this date. Ended with vaso for sx management. Provided updated HEP with good understanding of compliance. [] No change. [] Other:  [x] Patient would continue to benefit from skilled physical therapy services in order to: Progress R LE strength and ROM to help ease all mobility and return to golfing. STG/LTG  Goals  MET NOT MET ON-  GOING  Details   Date Addressed:           STG: To be met in 10 treatments            1. ? Pain: Decrease pain levels to 5/10 with all mobility to help ease all walking and stair navigation at home. []  []  []      2. ? ROM: Increase flexibility in R LE to help improve R knee ROM to equal L for better symmetry and gait mechanics. []  []  []      3. Improve score on assessment tool LEFS from 50% impairment to less than 40% impairment, demonstrating improved tolerance to activity to help allow pt to return to golfing. []  []  []      4. Independent with Home Exercise Programs []  []  []      5. Demonstrate knowledge of fall risk prevention  []  []  []        []  []  []      Date Addressed:            LTG: To be met in 20 treatments           1. Improve score on assessment tool LEFS from 50% impairment to less than 30% impairment, demonstrating improved tolerance to activity to help allow pt to return to golfing. []  []  []      2.  Reduce pain levels to 3/10 or less with all mobility to help ease all walking and stair navigation at home. []  []  []      3. ? Strength: Increase R LE strength to 5/5 grossly to help improve LE stability with all stair navigation at home and with golfing. []  []  []      4. Pt will demonstrate ability to ambulate without any AD and no deviations to return to baseline mobility. Pt. Education:  [x] Yes  [] No  [x] Reviewed Prior HEP/Ed  Method of Education: [x] Verbal  [x] Demo  [x] Written  Comprehension of Education:  [x] Verbalizes understanding. [x] Demonstrates understanding. [] Needs review. [] Demonstrates/verbalizes HEP/Ed previously given. Access Code: [email or web address]  URL: [email or web address]  Date: 01/03/2023  Prepared by: [address] [place] [address]    Exercises  Prone Quadriceps Stretch with Strap - 1 x daily - 7 x weekly - 3 sets - 30sec hold  Prone Knee Extension Hang - 1 x daily - 7 x weekly - 3-5 sets  Supine Knee Extension Strengthening - 1 x daily - 7 x weekly - 2 sets - 10 reps - 5sec hold  Seated Long Arc Quad - 1 x daily - 7 x weekly - 2 sets - 10 reps - 5sec hold         Plan: [x] Continue current frequency toward long and short term goals. [x] Specific Instructions for subsequent treatments: Continue tx per POC      Time In: 7:02 AM            Time Out: 8:02 AM    Electronically signed by:   [address] [place] [address]

## 2023-01-05 ENCOUNTER — HOSPITAL ENCOUNTER (OUTPATIENT)
Dept: PHYSICAL THERAPY | Facility: CLINIC | Age: 73
Setting detail: THERAPIES SERIES
Discharge: HOME | End: 2023-01-05
Payer: MEDICARE

## 2023-01-05 PROCEDURE — 97110 THERAPEUTIC EXERCISES: CPT

## 2023-01-05 PROCEDURE — 97016 VASOPNEUMATIC DEVICE THERAPY: CPT

## 2023-01-05 NOTE — FLOWSHEET NOTE
[] [facility]) - [facility] &  Therapy  [address]  [phone]  F: [phone] [] [address]  [address] [facility]   [address]  P: [phone]  F: [phone] [x] [place] &  Therapy  [address] [facility]  P: [phone]  F: [phone] [] [address]  P: [phone]  F: [phone] [] [address]  [facility]   [address]   [place]: [phone]  F: [phone]      Physical Therapy Daily Treatment Note    Date:  2023  Patient Name:  Enrico Bolanos    :  [birth year]  MRN: [id number]  Physician: Keren Marquez MD                                           Insurance: Medicare (Med. Nec.)  Medical Diagnosis: N43.041 (ICD-10-CM) - Presence of right artificial knee joint                Rehab Codes: M25. 461, [address] Tre [address], M25. 096, R26.2  Onset date: DOS (22)                            Next Dr's appt. : 23          Visit# / total visits: 3/16; Progress note for Medicare patient due at visit 10     Cancels/No Shows:     Subjective:    Pain:  [x] Yes  [] No Location: R Knee Pain Rating: (0-10 scale) 2/10  Pain altered Tx:  [] No  [] Yes  Action:  Comments: Pt mentioned that he is feeling a little better today. Objective:   Todays Treatment:  Modalities: Game Ready x 15 minutes 34* min pressure  Precautions: General  Exercise  R TKA Reps/ Time Weight/ Level Comments    Nustep  10'  L3   x              Calf/HS 3x30\"     x   Standing knee flexion S 3x30\"     x   Heel slides 10x10\"     x   Prone Quad S 3x30\"   x              Quad sets 2x10, 5\"     x   SLR 2x10     x   SAQ 20x5\"      x   LAQ 20x5\"     x   HS curl 20x   x              TG:Squats/HR 20x L 15   x   3-way hip NEXT        Other:  Measurments: knee ext-flex  1/5/23 3°-111° AROM              Treatment Charges: Mins Units   []  Modalities     [x]  Ther Exercise 35 2   []  Manual Therapy     []  Ther Activities     []  Aquatics     [x]  Vasocompression 15 1   []  Other     Total Treatment time 50 3       Assessment: [x] Progressing toward goals. Initiated session on NuStep to aide in mm activation. Progressed pt stretches to standing with no issues. Added in TG and HS curls today. Added overpressure into extension. Measurements taken today marked in table. Finished session with vaso. [] No change. [] Other:  [x] Patient would continue to benefit from skilled physical therapy services in order to: Progress R LE strength and ROM to help ease all mobility and return to golfing. Goals  MET NOT MET ON-  GOING  Details   Date Addressed:           STG: To be met in 10 treatments            1. ? Pain: Decrease pain levels to 5/10 with all mobility to help ease all walking and stair navigation at home. []  []  []      2. ? ROM: Increase flexibility in R LE to help improve R knee ROM to equal L for better symmetry and gait mechanics. []  []  []      3. Improve score on assessment tool LEFS from 50% impairment to less than 40% impairment, demonstrating improved tolerance to activity to help allow pt to return to golfing. []  []  []      4. Independent with Home Exercise Programs []  []  []      5. Demonstrate knowledge of fall risk prevention  []  []  []        []  []  []      Date Addressed:            LTG: To be met in 20 treatments           1. Improve score on assessment tool LEFS from 50% impairment to less than 30% impairment, demonstrating improved tolerance to activity to help allow pt to return to golfing. []  []  []      2. Reduce pain levels to 3/10 or less with all mobility to help ease all walking and stair navigation at home. []  []  []      3. ? Strength:  Increase R LE strength to 5/5 grossly to help improve LE stability with all stair navigation at home and with golfing. []  []  []      4. Pt will demonstrate ability to ambulate without any AD and no deviations to return to baseline mobility. Pt. Education:  [x] Yes  [] No  [] Reviewed Prior HEP/Ed  Method of Education: [x] Verbal  [] Demo  [] Written  Comprehension of Education:  [x] Verbalizes understanding. [] Demonstrates understanding. [] Needs review. [] Demonstrates/verbalizes HEP/Ed previously given. Access Code: [email or web address]  URL: [email or web address]  Date: 01/03/2023  Prepared by: Lion Frias    Exercises  Prone Quadriceps Stretch with Strap - 1 x daily - 7 x weekly - 3 sets - 30sec hold  Prone Knee Extension Hang - 1 x daily - 7 x weekly - 3-5 sets  Supine Knee Extension Strengthening - 1 x daily - 7 x weekly - 2 sets - 10 reps - 5sec hold  Seated Long Arc Quad - 1 x daily - 7 x weekly - 2 sets - 10 reps - 5sec hold         Plan: [x] Continue current frequency toward long and short term goals.     [x] Specific Instructions for subsequent treatments: Continue tx per POC      Time In: 8:00 AM            Time Out: 9:00 AM    Electronically signed by:  Morris Galvin PTA

## 2023-01-05 NOTE — PRE-CERTIFICATION NOTE
Medicare Cap   [x] Physical Therapy  [] Speech Therapy  [] Occupational therapy  *PT and Speech caps combine      $2230 Limit for PT and Speech combined  $2230 Limit for OT individually  At the beginning of the month where you expect to go over $2150, please add the [address] [place] modifier      Patient Name: Hedy Clock: 1950    Note:  This is an estimate of charges billed.      Date of [address] Name # units/ charge $$$ charge Daily Total Charge Ongoing Total $$$   12/28/22 EV+TE+Vaso 1+1+1 97.02+22.29+8.99 128.30 128.30   1/3/23 TE+Vaso 2+1 25.10+19.62+7.91 52.63 180.93   1/5/23 TE2, Vaso  25.10+19.62+7.91 52.63 233.56

## 2023-01-10 ENCOUNTER — HOSPITAL ENCOUNTER (OUTPATIENT)
Dept: PHYSICAL THERAPY | Facility: CLINIC | Age: 73
Setting detail: THERAPIES SERIES
Discharge: HOME | End: 2023-01-10
Payer: MEDICARE

## 2023-01-10 PROCEDURE — 97110 THERAPEUTIC EXERCISES: CPT

## 2023-01-10 PROCEDURE — 97016 VASOPNEUMATIC DEVICE THERAPY: CPT

## 2023-01-10 NOTE — FLOWSHEET NOTE
[] [facility]) - [facility] &  Therapy  [address]  [phone]  F: [phone] [] [email or web address]  [facility]   [address]  P: [phone]  F: [phone] [x] [place] &  Therapy  [address]  P: [phone]  F: [phone] [] [address]  P: [phone]  F: [phone] [] [address]  [facility]   [address]   [place]: [phone]  F: [phone]      Physical Therapy Daily Treatment Note    Date:  1/10/2023  Patient Name:  Lakshmi Teran    :  [birth year]  MRN: [id number]  Physician: Chetan Cuellar MD                                           Insurance: Medicare ([employer]  Medical Diagnosis: X96.326 (ICD-10-CM) - Presence of right artificial knee joint                Rehab Codes: M25. 461, [address], M25. 392, R26.2  Onset date: DOS (22)                            Next Dr's appt. : 23          Visit# / total visits: ; Progress note for Medicare patient due at visit 10     Cancels/No Shows:     Subjective:    Pain:  [x] Yes  [] No Location: R Knee Pain Rating: (0-10 scale) 1/10  Pain altered Tx:  [] No  [] Yes  Action:  Comments: Pt with stiffness but mentioned that it is feeling better every day. Objective:   Todays Treatment:  Modalities: Game Ready x 15 minutes 34* min pressure  Precautions: General  Exercise  R TKA Reps/ Time Weight/ Level Comments    Nustep  10'  L3   x              Calf/HS 3x30\"     x   Standing knee flexion S 3x30\"     x   Heel slides 10x10\"     x   Prone Quad S 3x30\"                 SLR 2x10     x   SAQ 20x5\"      x   LAQ 20x5\"    x   HS curl 20x   x              TG:Squats/HR 20x L 16   x   TKE 10x10\" Plum   x   3-way hip 15x Lime   x     Other:  Measurments: knee ext-flex  1/5/23     3°-111° AROM              Treatment Charges: Mins Units   []  Modalities     [x]  Ther Exercise 35 2   []  Manual Therapy     []  Ther Activities     []  Aquatics     [x]  Vasocompression 15 1   []  Other     Total Treatment time 50 3       Assessment: [x] Progressing toward goals. Increased height with TG to continue working on strengthening and deeper knee flexion. Added in 3-way hip for hip stability and TKE to promote quad strength for knee ext. Continued working on mat table strengthening and finished with vaso. [] No change. [] Other:  [x] Patient would continue to benefit from skilled physical therapy services in order to: Progress R LE strength and ROM to help ease all mobility and return to golfing. Goals  MET NOT MET ON-  GOING  Details   Date Addressed:           STG: To be met in 10 treatments            1. ? Pain: Decrease pain levels to 5/10 with all mobility to help ease all walking and stair navigation at home. []  []  []      2. ? ROM: Increase flexibility in R LE to help improve R knee ROM to equal L for better symmetry and gait mechanics. []  []  []      3. Improve score on assessment tool LEFS from 50% impairment to less than 40% impairment, demonstrating improved tolerance to activity to help allow pt to return to golfing. []  []  []      4. Independent with Home Exercise Programs []  []  []      5. Demonstrate knowledge of fall risk prevention  []  []  []        []  []  []      Date Addressed:            LTG: To be met in 20 treatments           1. Improve score on assessment tool LEFS from 50% impairment to less than 30% impairment, demonstrating improved tolerance to activity to help allow pt to return to golfing. []  []  []      2. Reduce pain levels to 3/10 or less with all mobility to help ease all walking and stair navigation at home. []  []  []      3. ? Strength:  Increase R LE strength to 5/5 grossly to help improve LE stability with all stair navigation at home and with golfing. []  []  []      4. Pt will demonstrate ability to ambulate without any AD and no deviations to return to baseline mobility. Pt. Education:  [x] Yes  [] No  [] Reviewed Prior HEP/Ed  Method of Education: [x] Verbal  [] Demo  [] Written  Comprehension of Education:  [x] Verbalizes understanding. [] Demonstrates understanding. [] Needs review. [] Demonstrates/verbalizes HEP/Ed previously given. Access Code: [email or web address]  URL: [email or web address]  Date: 01/03/2023  Prepared by: [address] [place] [address]    Exercises  Prone Quadriceps Stretch with Strap - 1 x daily - 7 x weekly - 3 sets - 30sec hold  Prone Knee Extension Hang - 1 x daily - 7 x weekly - 3-5 sets  Supine Knee Extension Strengthening - 1 x daily - 7 x weekly - 2 sets - 10 reps - 5sec hold  Seated Long Arc Quad - 1 x daily - 7 x weekly - 2 sets - 10 reps - 5sec hold         Plan: [x] Continue current frequency toward long and short term goals.     [x] Specific Instructions for subsequent treatments: Continue tx per POC      Time In: 8:00 AM            Time Out: 9:00 AM    Electronically signed by:  David Chapman PTA

## 2023-01-12 ENCOUNTER — HOSPITAL ENCOUNTER (OUTPATIENT)
Dept: PHYSICAL THERAPY | Facility: CLINIC | Age: 73
Setting detail: THERAPIES SERIES
Discharge: HOME | End: 2023-01-12
Payer: MEDICARE

## 2023-01-12 PROCEDURE — 97016 VASOPNEUMATIC DEVICE THERAPY: CPT

## 2023-01-12 PROCEDURE — 97110 THERAPEUTIC EXERCISES: CPT

## 2023-01-12 NOTE — FLOWSHEET NOTE
[] [facility]) - [facility] &  Therapy  [address]  [phone]  F: [phone] [] [address]  [facility]   [address]  P: [phone]  F: [phone] [x] [place] &  Therapy  [address]  P: [phone]  F: [phone] [] [email or web address]  P: [phone]  F: [phone] [] [address]  [facility]   [address]   [place]: [phone]  F: [phone]      Physical Therapy Daily Treatment Note    Date:  2023  Patient Name:  Paty Jones    :  [birth year]  MRN: [id number]  Physician: Soren Whitaker MD                                           Insurance: Medicare ([employer]  Medical Diagnosis: O24.157 (ICD-10-CM) - Presence of right artificial knee joint                Rehab Codes: M25. 461, [address], M25. 098, R26.2  Onset date: DOS (22)                            Next Dr's appt. : 23          Visit# / total visits: ; Progress note for Medicare patient due at visit 10     Cancels/No Shows:     Subjective:    Pain:  [x] Yes  [] No Location: R Knee Pain Rating: (0-10 scale) 1/10  Pain altered Tx:  [x] No  [] Yes  Action:  Comments: Pt feeling good today. Objective:   Todays Treatment:  Modalities: Game Ready x 15 minutes 34* min pressure  Precautions: General  Exercise  R TKA Reps/ Time Weight/ Level Comments    Nustep  10'  L3   x              Calf/HS 3x30\"     x   Standing knee flexion S 3x30\"     x   Heel slides 10x10\"                   SLR 2x10 2#   x   SAQ 20x5\"  2#   x   LAQ 20x5\" 2#   x   HS curl 20x 2#                TG:Squats/HR 30x L 20 Switch to single leg next session x   TKE 10x10\" Plum   x   3-way hip 20x Lime   x   Step ups 20x 6\"  Switch to lateral next session x     Other: Measurments: knee ext-flex  1/5/23     3°-111° AROM              Treatment Charges: Mins Units   []  Modalities     [x]  Ther Exercise 44 3   []  Manual Therapy     []  Ther Activities     []  Aquatics     [x]  Vasocompression 10 1   []  Other     Total Treatment time 54 4       Assessment: [x] Progressing toward goals. Pt with further progressions in height in the TG today with a planned progression into single leg next session. Added in step up to program in which pt was able to perform with no UE support, will add in lateral step ups tomorrow. 2# ankle weight was added into table exercises which challenged pt strength and mm endurance. Finished with vaso. [] No change. [] Other:  [x] Patient would continue to benefit from skilled physical therapy services in order to: Progress R LE strength and ROM to help ease all mobility and return to golfing. Goals  MET NOT MET ON-  GOING  Details   Date Addressed:           STG: To be met in 10 treatments            1. ? Pain: Decrease pain levels to 5/10 with all mobility to help ease all walking and stair navigation at home. []  []  []      2. ? ROM: Increase flexibility in R LE to help improve R knee ROM to equal L for better symmetry and gait mechanics. []  []  []      3. Improve score on assessment tool LEFS from 50% impairment to less than 40% impairment, demonstrating improved tolerance to activity to help allow pt to return to golfing. []  []  []      4. Independent with Home Exercise Programs []  []  []      5. Demonstrate knowledge of fall risk prevention  []  []  []        []  []  []      Date Addressed:            LTG: To be met in 20 treatments           1. Improve score on assessment tool LEFS from 50% impairment to less than 30% impairment, demonstrating improved tolerance to activity to help allow pt to return to golfing. []  []  []      2. Reduce pain levels to 3/10 or less with all mobility to help ease all walking and stair navigation at home. []  []  []      3. ? Strength: Increase R LE strength to 5/5 grossly to help improve LE stability with all stair navigation at home and with golfing. []  []  []      4. Pt will demonstrate ability to ambulate without any AD and no deviations to return to baseline mobility. Pt. Education:  [x] Yes  [] No  [] Reviewed Prior HEP/Ed  Method of Education: [x] Verbal  [] Demo  [] Written  Comprehension of Education:  [x] Verbalizes understanding. [] Demonstrates understanding. [] Needs review. [] Demonstrates/verbalizes HEP/Ed previously given. Access Code: [email or web address]  URL: [email or web address]  Date: 01/03/2023  Prepared by: [address] [place] [address]    Exercises  Prone Quadriceps Stretch with Strap - 1 x daily - 7 x weekly - 3 sets - 30sec hold  Prone Knee Extension Hang - 1 x daily - 7 x weekly - 3-5 sets  Supine Knee Extension Strengthening - 1 x daily - 7 x weekly - 2 sets - 10 reps - 5sec hold  Seated Long Arc Quad - 1 x daily - 7 x weekly - 2 sets - 10 reps - 5sec hold         Plan: [x] Continue current frequency toward long and short term goals.     [x] Specific Instructions for subsequent treatments: Continue tx per POC      Time In: 8:00 AM            Time Out: 9:00 AM    Electronically signed by:  Catrachita Smith PTA

## 2023-01-17 ENCOUNTER — HOSPITAL ENCOUNTER (OUTPATIENT)
Dept: PHYSICAL THERAPY | Facility: CLINIC | Age: 73
Setting detail: THERAPIES SERIES
Discharge: HOME | End: 2023-01-17
Payer: MEDICARE

## 2023-01-17 PROCEDURE — 97016 VASOPNEUMATIC DEVICE THERAPY: CPT

## 2023-01-17 PROCEDURE — 97110 THERAPEUTIC EXERCISES: CPT

## 2023-01-17 NOTE — FLOWSHEET NOTE
[] [facility]) - [facility] &  Therapy  [address]  [phone]  F: [phone] [] [address]  [employer] [facility]   [address]  P: [phone]  F: [phone] [x] [place] &  Therapy  [address]  P: [phone]  F: [phone] [] [address]  P: [phone]  F: [phone] [] [address]  [facility]   [address]   [place]: [phone]  F: [phone]      Physical Therapy Daily Treatment Note    Date:  2023  Patient Name:  Carisa Bolden    :  [birth year]  MRN: [id number]  Physician: Tereso Rodas MD                                           Insurance: Medicare ([employer]  Medical Diagnosis: T20.266 (ICD-10-CM) - Presence of right artificial knee joint                Rehab Codes: M25. 461, [address], M25. 628, R26.2  Onset date: DOS (22)                            Next Dr's appt. : 23          Visit# / total visits: ; Progress note for Medicare patient due at visit 10     Cancels/No Shows:     Subjective:    Pain:  [x] Yes  [] No Location: R Knee Pain Rating: (0-10 scale) 1/10  Pain altered Tx:  [x] No  [] Yes  Action:  Comments: Pt stated that he was feeling good just a little stiff this morning. Objective:   Todays Treatment:  Modalities: Game Ready x 15 minutes 34* min pressure  Precautions: General  Exercise  R TKA Reps/ Time Weight/ Level Comments    Nustep  10'  L3   x              Calf/HS 3x30\"     x   Standing knee flexion S 3x30\"     x   Heel slides 10x10\"                   SLR 2x10 2#       SAQ 20x5\"  2#      LAQ 20x5\" 2#      HS curl 20x 2#                TG:Squats/HR 20x L 16  x   Seated TKE 10x10\" Black SB   x   3-way hip 20x Blueberry    x   Step ups lateral  20x 6\"  Switch to lateral next session x     Other:  Measurments: knee ext-flex  1/5/23     3°-111° AROM              Treatment Charges: Mins Units   []  Modalities     [x]  Ther Exercise 46 3   []  Manual Therapy     []  Ther Activities     []  Aquatics     [x]  Vasocompression 10 1   []  Other     Total Treatment time 56 4       Assessment: [x] Progressing toward goals. Progressed pt strength and stability program with single leg TG squats/HR which was a challenge for pt with min rest breaks d/t fatigue, step ups performed laterally in // bars d/t UE support minimally. Stronger resistance band provided for hip stability. Along with TKE performed with black SB to further challenge pt. Finished session with vaso to aide in swelling reduction. [] No change. [] Other:  [x] Patient would continue to benefit from skilled physical therapy services in order to: Progress R LE strength and ROM to help ease all mobility and return to golfing. Goals  MET NOT MET ON-  GOING  Details   Date Addressed:           STG: To be met in 10 treatments            1. ? Pain: Decrease pain levels to 5/10 with all mobility to help ease all walking and stair navigation at home. []  []  []      2. ? ROM: Increase flexibility in R LE to help improve R knee ROM to equal L for better symmetry and gait mechanics. []  []  []      3. Improve score on assessment tool LEFS from 50% impairment to less than 40% impairment, demonstrating improved tolerance to activity to help allow pt to return to golfing. []  []  []      4. Independent with Home Exercise Programs []  []  []      5. Demonstrate knowledge of fall risk prevention  []  []  []        []  []  []      Date Addressed:            LTG: To be met in 20 treatments           1. Improve score on assessment tool LEFS from 50% impairment to less than 30% impairment, demonstrating improved tolerance to activity to help allow pt to return to golfing. []  []  []      2.  Reduce pain levels to 3/10 or less with all mobility to help ease all walking and stair navigation at home. []  []  []      3. ? Strength: Increase R LE strength to 5/5 grossly to help improve LE stability with all stair navigation at home and with golfing. []  []  []      4. Pt will demonstrate ability to ambulate without any AD and no deviations to return to baseline mobility. Pt. Education:  [x] Yes  [] No  [] Reviewed Prior HEP/Ed  Method of Education: [x] Verbal  [] Demo  [] Written  Comprehension of Education:  [x] Verbalizes understanding. [] Demonstrates understanding. [] Needs review. [] Demonstrates/verbalizes HEP/Ed previously given. Access Code: [email or web address]  URL: [email or web address]  Date: 01/03/2023  Prepared by: [facility]    Exercises  Prone Quadriceps Stretch with Strap - 1 x daily - 7 x weekly - 3 sets - 30sec hold  Prone Knee Extension Hang - 1 x daily - 7 x weekly - 3-5 sets  Supine Knee Extension Strengthening - 1 x daily - 7 x weekly - 2 sets - 10 reps - 5sec hold  Seated Long Arc Quad - 1 x daily - 7 x weekly - 2 sets - 10 reps - 5sec hold         Plan: [x] Continue current frequency toward long and short term goals.     [x] Specific Instructions for subsequent treatments: Continue tx per POC      Time In: 8:00 AM            Time Out: 9:00 AM    Electronically signed by:  Jenny Cervantes PTA

## 2023-01-19 ENCOUNTER — HOSPITAL ENCOUNTER (OUTPATIENT)
Dept: PHYSICAL THERAPY | Facility: CLINIC | Age: 73
Setting detail: THERAPIES SERIES
Discharge: HOME | End: 2023-01-19
Payer: MEDICARE

## 2023-01-19 PROCEDURE — 97016 VASOPNEUMATIC DEVICE THERAPY: CPT

## 2023-01-19 PROCEDURE — 97110 THERAPEUTIC EXERCISES: CPT

## 2023-01-19 NOTE — PRE-CERTIFICATION NOTE
Medicare Cap   [x] Physical Therapy  [] Speech Therapy  [] Occupational therapy  *PT and Speech caps combine      $2230 Limit for PT and Speech combined  $2230 Limit for OT individually  At the beginning of the month where you expect to go over $2150, please add the [address] [place] [address] modifier      Patient Name: Venus Garcia: 1950    Note:  This is an estimate of charges billed.      Date of [address] Name # units/ charge $$$ charge Daily Total Charge Ongoing Total $$$   12/28/22 EV+TE+Vaso 1+1+1 97.02+22.29+8.99 128.30 128.30   1/3/23 TE+Vaso 2+1 25.10+19.62+7.91 52.63 180.93   1/5/23 TE2, Vaso  25.10+19.62+7.91 52.63 233.56   1/10 TE2, vaso   52.63 286.19   1/12 TE3, vaso  25.10+19.62+19.62+7.91  72.25  358.44   1/17 TE3, vaso   72.25 430.69   1/17/23 TE3, vaso   72.25 502.94

## 2023-01-19 NOTE — FLOWSHEET NOTE
[] [facility]) - [facility] &  Therapy  [address]  [phone]  F: [phone] [] [address]  [employer] [address]   [address]  P: [phone]  F: [phone] [x] [place] &  Therapy  [address]  P: [phone]  F: [phone] [] [address]  P: [phone]  F: [phone] [] [address]  [facility]   [address]   [place]: [phone]  F: [phone]      Physical Therapy Daily Treatment Note    Date:  2023  Patient Name:  Corita Kayser    :  [birth year]  MRN: [id number]  Physician: Luis Douglas MD                                           Insurance: Medicare ([employer]  Medical Diagnosis: J69.759 (ICD-10-CM) - Presence of right artificial knee joint                Rehab Codes: M25. 461, [address], M25. 011, R26.2  Onset date: DOS (22)                            Next Dr's appt. : 23          Visit# / total visits: ; Progress note for Medicare patient due at visit 10     Cancels/No Shows:     Subjective:    Pain:  [x] Yes  [] No Location: R Knee Pain Rating: (0-10 scale) 1/10  Pain altered Tx:  [x] No  [] Yes  Action:  Comments: Pt mentioned that he was pretty worn out after last session. Objective:   Todays Treatment:  Modalities: Game Ready x 15 minutes 34* min pressure  Precautions: General  Exercise  R TKA Reps/ Time Weight/ Level Comments    Nustep  10'  L3   x              Calf/HS 3x30\"     x   Standing knee flexion S 3x30\"     x   Heel slides 10x10\"                   SLR 2x10 3#   x   SAQ 20x5\"  3#      LAQ 20x5\" 3#   x   HS curl 20x 3#  x              TG:Squats/HR SL 20x L 16  x   Seated TKE 10x10\" Black SB   x   3-way hip 20x Blueberry    x   Step ups lateral  20x 6\"    x     Other: Measurments: knee ext-flex  1/5/23 1/19/23    3°-111° AROM 0° - 114°             Treatment Charges: Mins Units   []  Modalities     [x]  Ther Exercise 47 3   []  Manual Therapy     []  Ther Activities     []  Aquatics     [x]  Vasocompression 10 1   []  Other     Total Treatment time 56 4       Assessment: [x] Progressing toward goals.   Pt able to continue with all stretches and warm up at the beginning. Continued with all standing exercise with no progressions d/t increasing last session in standing. Pt able to move up to 3# ankle weight today with table exercises. Pt showing improvement in ROM marked above. Finished with vaso today.     [] No change.     [] Other:  [x] Patient would continue to benefit from skilled physical therapy services in order to: Progress R LE strength and ROM to help ease all mobility and return to golfing.      Goals  MET NOT MET ON-  GOING  Details   Date Addressed:           STG: To be met in 10 treatments            1. ? Pain: Decrease pain levels to 5/10 with all mobility to help ease all walking and stair navigation at home. []  []  []      2. ? ROM: Increase flexibility in R LE to help improve R knee ROM to equal L for better symmetry and gait mechanics. []  []  []      3. Improve score on assessment tool LEFS from 50% impairment to less than 40% impairment, demonstrating improved tolerance to activity to help allow pt to return to golfing. []  []  []      4. Independent with Home Exercise Programs []  []  []      5. Demonstrate knowledge of fall risk prevention  []  []  []        []  []  []      Date Addressed:            LTG: To be met in 20 treatments           1. Improve score on assessment tool LEFS from 50% impairment to less than 30% impairment, demonstrating improved tolerance to activity to help allow pt to return to golfing. []  []  []      2. Reduce pain levels to 3/10 or less with all mobility to help ease all walking and stair navigation at home. []  []  []      3. ?  Strength: Increase R LE strength to 5/5 grossly to help improve LE stability with all stair navigation at home and with golfing. []  []  []      4. Pt will demonstrate ability to ambulate without any AD and no deviations to return to baseline mobility. Pt. Education:  [x] Yes  [] No  [] Reviewed Prior HEP/Ed  Method of Education: [x] Verbal  [] Demo  [] Written  Comprehension of Education:  [x] Verbalizes understanding. [] Demonstrates understanding. [] Needs review. [] Demonstrates/verbalizes HEP/Ed previously given. Access Code: [email or web address]  URL: [employer]/  Date: 01/03/2023  Prepared by: [facility]    Exercises  Prone Quadriceps Stretch with Strap - 1 x daily - 7 x weekly - 3 sets - 30sec hold  Prone Knee Extension Hang - 1 x daily - 7 x weekly - 3-5 sets  Supine Knee Extension Strengthening - 1 x daily - 7 x weekly - 2 sets - 10 reps - 5sec hold  Seated Long Arc Quad - 1 x daily - 7 x weekly - 2 sets - 10 reps - 5sec hold         Plan: [x] Continue current frequency toward long and short term goals.     [x] Specific Instructions for subsequent treatments: Continue tx per POC      Time In: 7:59 AM            Time Out:  8:58 am    Electronically signed by:  Darian Villa PTA

## 2023-01-24 ENCOUNTER — HOSPITAL ENCOUNTER (OUTPATIENT)
Dept: PHYSICAL THERAPY | Facility: CLINIC | Age: 73
Setting detail: THERAPIES SERIES
Discharge: HOME | End: 2023-01-24
Payer: MEDICARE

## 2023-01-24 NOTE — FLOWSHEET NOTE
[x] [place] [address]  Outpatient Rehabilitation &  Therapy  [address] [place] [address]  P: [phone]  F: [phone]     Physical Therapy Cancel/No Show note    Date: 2023  Patient: Cameron Rai Sr.  : 1950  MRN: [id number]    Cancels/No Shows to date:     For today's appointment patient:    [x]  Cancelled    [] Rescheduled appointment    [] No-show     Reason given by patient:    []  Patient ill    []  Conflicting appointment    [] No transportation      [] Conflict with work    [] No reason given    [] Weather related    [] COVID-19    [] Other:      Comments:        [x] Next appointment was confirmed    Electronically signed by: Morris Galvin PTA

## 2023-01-26 ENCOUNTER — OFFICE VISIT (OUTPATIENT)
Dept: PAIN MANAGEMENT | Age: 73
End: 2023-01-26
Payer: MEDICARE

## 2023-01-26 ENCOUNTER — HOSPITAL ENCOUNTER (OUTPATIENT)
Dept: PHYSICAL THERAPY | Facility: CLINIC | Age: 73
Setting detail: THERAPIES SERIES
Discharge: HOME | End: 2023-01-26
Payer: MEDICARE

## 2023-01-26 VITALS — HEIGHT: 67 IN | BODY MASS INDEX: 34.06 KG/M2 | WEIGHT: 217 LBS

## 2023-01-26 DIAGNOSIS — G89.29 OTHER CHRONIC PAIN: ICD-10-CM

## 2023-01-26 DIAGNOSIS — M19.90 INFLAMMATORY ARTHROPATHY: ICD-10-CM

## 2023-01-26 DIAGNOSIS — Z79.891 CHRONIC USE OF OPIATE FOR THERAPEUTIC PURPOSE: Primary | ICD-10-CM

## 2023-01-26 PROCEDURE — G8417 CALC BMI ABV UP PARAM F/U: HCPCS | Performed by: NURSE PRACTITIONER

## 2023-01-26 PROCEDURE — 3017F COLORECTAL CA SCREEN DOC REV: CPT | Performed by: NURSE PRACTITIONER

## 2023-01-26 PROCEDURE — G8427 DOCREV CUR MEDS BY ELIG CLIN: HCPCS | Performed by: NURSE PRACTITIONER

## 2023-01-26 PROCEDURE — 1123F ACP DISCUSS/DSCN MKR DOCD: CPT | Performed by: NURSE PRACTITIONER

## 2023-01-26 PROCEDURE — G8484 FLU IMMUNIZE NO ADMIN: HCPCS | Performed by: NURSE PRACTITIONER

## 2023-01-26 PROCEDURE — 1036F TOBACCO NON-USER: CPT | Performed by: NURSE PRACTITIONER

## 2023-01-26 PROCEDURE — 97110 THERAPEUTIC EXERCISES: CPT

## 2023-01-26 PROCEDURE — 99213 OFFICE O/P EST LOW 20 MIN: CPT | Performed by: NURSE PRACTITIONER

## 2023-01-26 RX ORDER — OXYCODONE HYDROCHLORIDE AND ACETAMINOPHEN 5; 325 MG/1; MG/1
1 TABLET ORAL EVERY 8 HOURS PRN
Qty: 90 TABLET | Refills: 0 | Status: SHIPPED | OUTPATIENT
Start: 2023-02-02 | End: 2023-03-04

## 2023-01-26 ASSESSMENT — ENCOUNTER SYMPTOMS
CONSTIPATION: 0
BACK PAIN: 1
BOWEL INCONTINENCE: 0
COUGH: 0
SHORTNESS OF BREATH: 0

## 2023-01-26 NOTE — PROGRESS NOTES
Chief Complaint   Patient presents with    Back Pain    Medication Refill         [facility]     Patient complains of diffuse joint pain. Hx of rheumatoid arthritis and follows with rheumatologist. Currently prescribed diclofenac and methotrexate. He also takes percocet 5/325 TID for pain from our office. He has been stable and compliant on this dose for many years. He has had surgery on both shoulders and both knees. Most recent was right TKR done 12/22 with Dr Hebert Mclain. Currently in PT for knee pain       Back Pain  This is a chronic problem. The current episode started more than 1 year ago. The problem occurs constantly. The problem has been gradually improving since onset. The pain is present in the lumbar spine. The quality of the pain is described as aching and stabbing. The pain is at a severity of 4/10. The pain is moderate. The pain is Worse during the night. The symptoms are aggravated by position, twisting and bending. Pertinent negatives include no bladder incontinence, bowel incontinence, chest pain or fever. He has tried home exercises, bed rest, walking, chiropractic manipulation, heat, ice, muscle relaxant and NSAIDs for the symptoms. The treatment provided mild relief. Generalized Body Aches  This is a chronic problem. The current episode started more than 1 year ago. The problem occurs constantly. The problem has been unchanged. Associated symptoms include arthralgias (hands shoulders knees). Pertinent negatives include no chest pain, chills, coughing or fever. The symptoms are aggravated by exertion (weather change). He has tried acetaminophen, heat, ice, NSAIDs, oral narcotics, position changes and rest for the symptoms. The treatment provided moderate relief. Patient denies any new neurological symptoms. No bowel or bladder incontinence, no weakness, and no falling.     Pill count: Not present with patient  Pt instructed to bring pills to appt in order to document compliance, verbalized understanding  due 2/2    Morphine equivalent: 22.5    Controlled Substance Monitoring:    Acute and Chronic Pain Monitoring:   RX Monitoring 1/26/2023   Attestation -   Periodic Controlled Substance Monitoring Possible medication side effects, risk of tolerance/dependence & alternative treatments discussed. ;No signs of potential drug abuse or diversion identified. ;Assessed functional status. ;Obtaining appropriate analgesic effect of treatment. Periodic Controlled Substance Monitoring: Possible medication side effects, risk of tolerance/dependence & alternative treatments discussed., No signs of potential drug abuse or diversion identified. , Assessed functional status., Obtaining appropriate analgesic effect of treatment. Sourav Kyle, APRN - CNP)      Past Medical History:   Diagnosis Date    Calcium pyrophosphate deposition disease     Diverticulosis of colon     Hypertension     Osteoarthritis        Past Surgical History:   Procedure Laterality Date    COLONOSCOPY  11/26/2008    diverticulosis    COLONOSCOPY  12/29/2017    Diverticulosis    HERNIA REPAIR      PILONIDAL CYST EXCISION      PA COLON CA SCRN NOT  [address] [place] [address] IND N/A 12/29/2017    COLONOSCOPY performed by Tatiana Dick MD at [address] [place] [address],[address] Right 06/2020    TOTAL KNEE ARTHROPLASTY Left 01/19/2016    [place]/Dr. Mac    TOTAL KNEE ARTHROPLASTY Right 12/2022    Dr Swetha Amado       No Known Allergies      Current Outpatient Medications:     ASPIRIN LOW DOSE 81 MG EC tablet, TAKE 1 TABLET BY MOUTH TWO TIMES A DAY (MORNING AND BEDTIME). BEGIN TAKING DAY ONE FOLLOWING JOINT REPLACEMENT, Disp: , Rfl:     meloxicam (MOBIC) 15 MG tablet, TAKE 1 TABLET BY MOUTH IN THE MORNING. To begin taking day one following your joint replacement. , Disp: , Rfl:     ondansetron (ZOFRAN) 4 MG tablet, TAKE 1 TABLET BY MOUTH EVERY EIGHT HOURS AS NEEDED FOR NAUSEA AND VOMITING, Disp: , Rfl:     oxyCODONE-acetaminophen (PERCOCET) 5-325 MG per tablet, Take 1 tablet by mouth every 8 hours as needed for Pain for up to 30 days. , Disp: 90 tablet, Rfl: 0    sildenafil (VIAGRA) 100 MG tablet, Take 1 tablet by mouth daily as needed for Erectile Dysfunction, Disp: 30 tablet, Rfl: 2    enalapril (VASOTEC) 10 MG tablet, TAKE 1 TABLET BY MOUTH EVERY DAY, Disp: 90 tablet, Rfl: 1    amLODIPine (NORVASC) 10 MG tablet, TAKE 1 TABLET BY MOUTH EVERY DAY, Disp: 90 tablet, Rfl: 1    diclofenac (VOLTAREN) 75 MG EC tablet, TAKE 1 TABLET BY MOUTH TWO TIMES A DAY AS NEEDED, Disp: , Rfl:     omeprazole (PRILOSEC) 20 MG delayed release capsule, TAKE 1 CAPSULE BY MOUTH 30 minutes before morning meal, Disp: , Rfl:     metoprolol tartrate (LOPRESSOR) 25 MG tablet, TAKE 1 TABLET BY MOUTH TWO TIMES A DAY **DUE TO APPOINTMENT, Disp: 180 tablet, Rfl: 1    atorvastatin (LIPITOR) 20 MG tablet, TAKE 1 TABLET BY MOUTH EVERY DAY, Disp: 90 tablet, Rfl: 1    predniSONE (DELTASONE) 5 MG tablet, TAKE 1 TABLET BY MOUTH ONCE A DAY, Disp: , Rfl:     ELDERBERRY PO, Take 2 tablets by mouth daily, Disp: , Rfl:     Multiple Vitamins-Minerals (PRESERVISION/LUTEIN PO), Take 2 capsules by mouth daily, Disp: , Rfl:     folic acid (FOLVITE) 1 MG tablet, daily, Disp: , Rfl:     methotrexate (RHEUMATREX) 2.5 MG chemo tablet, Take 2.5 mg by mouth 10 tablets weekly, Disp: , Rfl:     Multiple Vitamins-Minerals (MULTIVITAMIN PO), Take  by mouth., Disp: , Rfl:     CALCIUM PO, Take  by mouth., Disp: , Rfl:     History reviewed. No pertinent family history.     Social History     Socioeconomic History    Marital status:      Spouse name: Not on file    Number of children: Not on file    Years of education: Not on file    Highest education level: Not on file   Occupational History    Not on file   Tobacco Use    Smoking status: Former     Packs/day: 1.50     Years: 25.00     Pack years: 37.50     Types: Cigarettes     Quit date:      Years since quittin.0    Smokeless tobacco: Never Tobacco comments:     estimated quit date   Substance and Sexual Activity    Alcohol use: No    Drug use: No    Sexual activity: Not on file   Other Topics Concern    Not on file   Social History Narrative    Not on file     Social Determinants of Health     Financial Resource Strain: Not on file   Food Insecurity: Not on file   Transportation Needs: Not on file   Physical Activity: Sufficiently Active    Days of Exercise per Week: 6 days    Minutes of Exercise per Session: 30 min   Stress: Not on file   Social Connections: Not on file   Intimate Partner Violence: Not At Risk    Fear of Current or Ex-Partner: No    Emotionally Abused: No    Physically Abused: No    Sexually Abused: No   Housing Stability: Not on file       Review of Systems:  Review of Systems   Constitutional: Negative for chills and fever. Cardiovascular:  Negative for chest pain. Respiratory:  Negative for cough and shortness of breath. Musculoskeletal:  Positive for arthralgias (hands shoulders knees) and back pain. Gastrointestinal:  Negative for bowel incontinence and constipation. Genitourinary:  Negative for bladder incontinence. Physical Exam:  Ht 5' 7\" (1.702 m)   Wt 217 lb (98.4 kg)   BMI 33.99 kg/m²     Physical Exam  Cardiovascular:      Rate and Rhythm: Normal rate. Pulmonary:      Effort: Pulmonary effort is normal.   Musculoskeletal:         General: Normal range of motion. Skin:     General: Skin is warm and dry. Neurological:      Mental Status: He is alert and oriented to person, place, and time. Record/Diagnostics Review:    Last demetrice 10/22  and was appropriate     Assessment:  Problem List Items Addressed This Visit       Other chronic pain    Inflammatory arthropathy     Other Visit Diagnoses       Chronic use of opiate for therapeutic purpose    -  Primary               Treatment Plan:  Patient relates current medications are helping the pain.  Patient reports taking pain medications as prescribed, denies obtaining medications from different sources and denies use of illegal drugs. Medication risk and benefits have been discussed. Patient denies side effects from medications like nausea, vomiting, constipation or drowsiness. Patient reports current activities of daily living are possible due to medications and would like to continue them. As always, we encourage daily stretching and strengthening exercises, and recommend minimizing use of pain medications unless patient cannot get through daily activities due to pain. Due to the high risk nature of this patient's pain medication close monitoring is required. Continue current medication management, pt has been stable and compliant. Script written for percocet  Follow up appointment made for 4 weeks    I have reviewed the chief complaint and history of present illness (including ROS and PFSH) and vital documentation by my staff and I agree with their documentation and have added where applicable.

## 2023-01-26 NOTE — PRE-CERTIFICATION NOTE
Medicare Cap   [x] Physical Therapy  [] Speech Therapy  [] Occupational therapy  *PT and Speech caps combine      $2230 Limit for PT and Speech combined  $2230 Limit for OT individually  At the beginning of the month where you expect to go over $2150, please add the [address] modifier      Patient Name: Andres Castaneda: 1950    Note:  This is an estimate of charges billed.      Date of [address] Name # units/ charge $$$ charge Daily Total Charge Ongoing Total $$$   12/28/22 EV+TE+Vaso 1+1+1 97.02+22.29+8.99 128.30 128.30   1/3/23 TE+Vaso 2+1 25.10+19.62+7.91 52.63 180.93   1/5/23 TE2, Vaso  25.10+19.62+7.91 52.63 233.56   1/10 TE2, vaso   52.63 286.19   1/12 TE3, vaso  25.10+19.62+19.62+7.91  72.25  358.44   1/17 TE3, vaso   72.25 430.69   1/17/23 TE3, vaso   72.25 502.94   1/26 TE 3 28.50+22.29+22.29 73.08 576.02

## 2023-01-26 NOTE — PROGRESS NOTES
[] [facility]) [facility]  [address]  [phone]  F: [phone] [] [address]  [address]   [address]  P: [phone]  F: [phone] [x] [address]  [address]  P: [phone]  F: [phone] [] [address]  P: [phone]  F: [phone] [] [address]  [facility]   [address]   [place]: [phone]  F: [phone]      Physical Therapy Daily Treatment Note/ Progress Report    Date:  2023  Patient Name:  Alpesh Rodriguez    :    MRN: [id number]  Physician: Erika Carey MD                                           Insurance: Medicare ([employer]  Medical Diagnosis: G03.055 (ICD-10-CM) - Presence of right artificial knee joint                Rehab Codes: M25. 461, [address], M25. 019, R26.2  Onset date: DOS (22)                            Next Dr's appt. : 23  Visit# / total visits: ; Progress note for Medicare patient due at visit 18     Cancels/No Shows: 1/0    Subjective:  Pt reports to PT with 2/10 pain, but denies any new complaints or changes since last session. Pain:  [x] Yes  [] No  Location: R Knee Pain Rating: (0-10 scale) 2/10  Pain altered Tx:  [x] No  [] Yes  Action:  Comments:     Objective:   Todays Treatment:  Modalities: Game Ready x 15 minutes 34* min pressure- Held today  Precautions: General  Exercise  R TKA Reps/ Time Weight/ Level Comments    Nustep  10'  L3   x    Bike     Next            Calf/HS 3x30\"     x   Standing knee flexion S 3x30\"     x   Heel slides 10x15\"     x              SLR 2x10 3#   x   SAQ 20x5\"  3#   x   LAQ 20x5\" 4#   x   HS curl 20x 3#                TG:Squats/HR SL 20x L 18  x   Seated TKE 10x10\" Black SB 3-way hip 20x [place]       Step ups 20x 8\"    x   Sit to stands 20x  Chair x     Other:  Measurments: knee ext-flex  1/5/23 1/19/23    3°-111° AROM 0° - 114°             Treatment Charges: Mins Units   []  Modalities     [x]  Ther Exercise 43 3   []  Manual Therapy     []  Ther Activities     []  Aquatics     []  Vasocompression     []  Other     Total Treatment time 43 3       Assessment: [x] Progressing toward goals. Completed session per log with good tolerance. Progressed program today with increased height with step ups, increased weight with LAQ and SLR, increased level on TG squats, and adding sit to stands to keep progressing LE strength. Pt required cueing with TG squats to focus on controlling valgus collapse, good carryover after cued. Pt declined need for vaso today. No increased pain reported following session. Since starting therapy, pt has demonstrated improved R LE strength and ROM, as well as overall mobility. Currently, pt still with some deficits in his functional strength, as well as slight difference in knee flexion ROM from L side. Plan to continue with PT per POC to keep working on functional strength with pt in agreement with plan.     [] No change. [] Other:  [x] Patient would continue to benefit from skilled physical therapy services in order to: Progress R LE strength and ROM to help ease all mobility and return to golfing. Goals  MET NOT MET ON-  GOING  Details   Date Addressed: 1/26/23           STG: To be met in 10 treatments            1. ? Pain: Decrease pain levels to 5/10 with all mobility to help ease all walking and stair navigation at home. [x]  []  []      2. ? ROM: Increase flexibility in R LE to help improve R knee ROM to equal L for better symmetry and gait mechanics. []  []  [x]  -1-120    3. Improve score on assessment tool LEFS from 50% impairment to less than 40% impairment, demonstrating improved tolerance to activity to help allow pt to return to golfing. [x]  []  []  Pt scored 15% impaired    4. Independent with Home Exercise Programs [x]  []  []      5. Demonstrate knowledge of fall risk prevention  [x]  []  []        []  []  []      Date Addressed:            LTG: To be met in 20 treatments           1. Improve score on assessment tool LEFS from 50% impairment to less than 30% impairment, demonstrating improved tolerance to activity to help allow pt to return to golfing. []  []  []      2. Reduce pain levels to 3/10 or less with all mobility to help ease all walking and stair navigation at home. []  []  []      3. ? Strength: Increase R LE strength to 5/5 grossly to help improve LE stability with all stair navigation at home and with golfing. []  []  []      4. Pt will demonstrate ability to ambulate without any AD and no deviations to return to baseline mobility. Pt. Education:  [x] Yes  [] No  [] Reviewed Prior HEP/Ed  Method of Education: [x] Verbal  [x] Demo  [] Written  Comprehension of Education:  [x] Verbalizes understanding. [x] Demonstrates understanding. [] Needs review. [x] Demonstrates/verbalizes HEP/Ed previously given. Plan: [x] Continue current frequency toward long and short term goals. [x] Specific Instructions for subsequent treatments: Continue tx per POC      Time In: 0161            Time Out:  6595    Electronically signed by:   Camille Litten, PT

## 2023-01-31 ENCOUNTER — APPOINTMENT (OUTPATIENT)
Dept: PHYSICAL THERAPY | Facility: CLINIC | Age: 73
End: 2023-01-31
Payer: MEDICARE

## 2023-02-06 RX ORDER — ATORVASTATIN CALCIUM 20 MG/1
TABLET, FILM COATED ORAL
Qty: 90 TABLET | Refills: 0 | Status: SHIPPED | OUTPATIENT
Start: 2023-02-06

## 2023-02-23 ENCOUNTER — TELEMEDICINE (OUTPATIENT)
Dept: PAIN MANAGEMENT | Age: 73
End: 2023-02-23
Payer: MEDICARE

## 2023-02-23 DIAGNOSIS — M05.79 RHEUMATOID ARTHRITIS INVOLVING MULTIPLE SITES WITH POSITIVE RHEUMATOID FACTOR (HCC): ICD-10-CM

## 2023-02-23 DIAGNOSIS — Z79.891 ENCOUNTER FOR LONG-TERM OPIATE ANALGESIC USE: Primary | ICD-10-CM

## 2023-02-23 DIAGNOSIS — M19.90 INFLAMMATORY ARTHROPATHY: ICD-10-CM

## 2023-02-23 DIAGNOSIS — G89.29 OTHER CHRONIC PAIN: ICD-10-CM

## 2023-02-23 PROCEDURE — G8427 DOCREV CUR MEDS BY ELIG CLIN: HCPCS | Performed by: NURSE PRACTITIONER

## 2023-02-23 PROCEDURE — G8484 FLU IMMUNIZE NO ADMIN: HCPCS | Performed by: NURSE PRACTITIONER

## 2023-02-23 PROCEDURE — 1036F TOBACCO NON-USER: CPT | Performed by: NURSE PRACTITIONER

## 2023-02-23 PROCEDURE — 99213 OFFICE O/P EST LOW 20 MIN: CPT | Performed by: NURSE PRACTITIONER

## 2023-02-23 PROCEDURE — G8417 CALC BMI ABV UP PARAM F/U: HCPCS | Performed by: NURSE PRACTITIONER

## 2023-02-23 PROCEDURE — 1123F ACP DISCUSS/DSCN MKR DOCD: CPT | Performed by: NURSE PRACTITIONER

## 2023-02-23 PROCEDURE — 3017F COLORECTAL CA SCREEN DOC REV: CPT | Performed by: NURSE PRACTITIONER

## 2023-02-23 RX ORDER — OXYCODONE HYDROCHLORIDE AND ACETAMINOPHEN 5; 325 MG/1; MG/1
1 TABLET ORAL EVERY 8 HOURS PRN
Qty: 90 TABLET | Refills: 0 | Status: SHIPPED | OUTPATIENT
Start: 2023-03-04 | End: 2023-04-03

## 2023-02-23 ASSESSMENT — ENCOUNTER SYMPTOMS
BOWEL INCONTINENCE: 0
BACK PAIN: 1

## 2023-02-23 NOTE — PROGRESS NOTES
Chief Complaint   Patient presents with    Back Pain    Medication Refill       [facility]  Patient complains of diffuse joint pain. Hx of rheumatoid arthritis and follows with rheumatologist. Currently prescribed diclofenac and methotrexate. He also takes percocet 5/325 TID for pain from our office. He has been stable and compliant on this dose for many years. He has had surgery on both shoulders and both knees. Most recent was right TKR done 12/22 with Dr Cydney Ashford. No need for f/u at this time. Back Pain  This is a chronic problem. The current episode started more than 1 year ago. The problem is unchanged. The pain is present in the lumbar spine. The quality of the pain is described as shooting and aching. The pain does not radiate. The pain is at a severity of 4/10. The pain is mild. The pain is Worse during the night. The symptoms are aggravated by bending, standing, sitting and twisting. Pertinent negatives include no bladder incontinence or bowel incontinence. He has tried bed rest, home exercises, heat, ice, muscle relaxant, NSAIDs and chiropractic manipulation for the symptoms. The treatment provided mild relief. Patient denies any new neurological symptoms. No bowel or bladder incontinence, no weakness, and no falling. Pill count: appropriate 3/4    Morphine equivalent: 22.5    Controlled Substance Monitoring:    Acute and Chronic Pain Monitoring:   RX Monitoring 2/23/2023   Attestation -   Periodic Controlled Substance Monitoring Possible medication side effects, risk of tolerance/dependence & alternative treatments discussed. ;No signs of potential drug abuse or diversion identified. ;Assessed functional status. ;Obtaining appropriate analgesic effect of treatment. Periodic Controlled Substance Monitoring: Possible medication side effects, risk of tolerance/dependence & alternative treatments discussed., No signs of potential drug abuse or diversion identified. , Assessed functional status., Obtaining appropriate analgesic effect of treatment. Gabino Dow, APRN - CNP)      Past Medical History:   Diagnosis Date    Calcium pyrophosphate deposition disease     Diverticulosis of colon     Hypertension     Osteoarthritis        Past Surgical History:   Procedure Laterality Date    COLONOSCOPY  11/26/2008    diverticulosis    COLONOSCOPY  12/29/2017    Diverticulosis    HERNIA REPAIR      PILONIDAL CYST EXCISION      CA COLON CA SCRN NOT  [address] IND N/A 12/29/2017    COLONOSCOPY performed by Cammie Woods MD at [address] Right 06/2020    TOTAL KNEE ARTHROPLASTY Left 01/19/2016    [place]/Dr. Mac    TOTAL KNEE ARTHROPLASTY Right 12/2022    Dr Leann Ornelas       No Known Allergies      Current Outpatient Medications:     [START ON 3/4/2023] oxyCODONE-acetaminophen (PERCOCET) 5-325 MG per tablet, Take 1 tablet by mouth every 8 hours as needed for Pain for up to 30 days. , Disp: 90 tablet, Rfl: 0    atorvastatin (LIPITOR) 20 MG tablet, TAKE 1 TABLET BY MOUTH EVERY DAY, Disp: 90 tablet, Rfl: 0    ASPIRIN LOW DOSE 81 MG EC tablet, TAKE 1 TABLET BY MOUTH TWO TIMES A DAY (MORNING AND BEDTIME).   BEGIN TAKING DAY ONE FOLLOWING JOINT REPLACEMENT, Disp: , Rfl:     ondansetron (ZOFRAN) 4 MG tablet, TAKE 1 TABLET BY MOUTH EVERY EIGHT HOURS AS NEEDED FOR NAUSEA AND VOMITING, Disp: , Rfl:     sildenafil (VIAGRA) 100 MG tablet, Take 1 tablet by mouth daily as needed for Erectile Dysfunction, Disp: 30 tablet, Rfl: 2    enalapril (VASOTEC) 10 MG tablet, TAKE 1 TABLET BY MOUTH EVERY DAY, Disp: 90 tablet, Rfl: 1    amLODIPine (NORVASC) 10 MG tablet, TAKE 1 TABLET BY MOUTH EVERY DAY, Disp: 90 tablet, Rfl: 1    diclofenac (VOLTAREN) 75 MG EC tablet, TAKE 1 TABLET BY MOUTH TWO TIMES A DAY AS NEEDED, Disp: , Rfl:     omeprazole (PRILOSEC) 20 MG delayed release capsule, TAKE 1 CAPSULE BY MOUTH 30 minutes before morning meal, Disp: , Rfl:     metoprolol tartrate (LOPRESSOR) 25 MG tablet, TAKE 1 TABLET BY MOUTH TWO TIMES A DAY **DUE TO APPOINTMENT, Disp: 180 tablet, Rfl: 1    predniSONE (DELTASONE) 5 MG tablet, TAKE 1 TABLET BY MOUTH ONCE A DAY, Disp: , Rfl:     ELDERBERRY PO, Take 2 tablets by mouth daily, Disp: , Rfl:     Multiple Vitamins-Minerals (PRESERVISION/LUTEIN PO), Take 2 capsules by mouth daily, Disp: , Rfl:     folic acid (FOLVITE) 1 MG tablet, daily, Disp: , Rfl:     methotrexate (RHEUMATREX) 2.5 MG chemo tablet, Take 2.5 mg by mouth 10 tablets weekly, Disp: , Rfl:     Multiple Vitamins-Minerals (MULTIVITAMIN PO), Take  by mouth., Disp: , Rfl:     CALCIUM PO, Take  by mouth., Disp: , Rfl:     No family history on file. Social History     Socioeconomic History    Marital status:      Spouse name: Not on file    Number of children: Not on file    Years of education: Not on file    Highest education level: Not on file   Occupational History    Not on file   Tobacco Use    Smoking status: Former     Packs/day: 1.50     Years: 25.00     Pack years: 37.50     Types: Cigarettes     Quit date: 34     Years since quittin.1    Smokeless tobacco: Never    Tobacco comments:     estimated quit date   Substance and Sexual Activity    Alcohol use: No    Drug use: No    Sexual activity: Not on file   Other Topics Concern    Not on file   Social History Narrative    Not on file     Social Determinants of Health     Financial Resource Strain: Not on file   Food Insecurity: Not on file   Transportation Needs: Not on file   Physical Activity: Sufficiently Active    Days of Exercise per Week: 6 days    Minutes of Exercise per Session: 30 min   Stress: Not on file   Social Connections: Not on file   Intimate Partner Violence: Not At Risk    Fear of Current or Ex-Partner: No    Emotionally Abused: No    Physically Abused: No    Sexually Abused: No   Housing Stability: Not on file       Review of Systems:  Review of Systems   Musculoskeletal:  Positive for back pain. Gastrointestinal:  Negative for bowel incontinence. Genitourinary:  Negative for bladder incontinence. Physical Exam:  There were no vitals taken for this visit. Physical Exam    Record/Diagnostics Review:    Last demetrice 10/22 and was appropriate     Assessment:  Problem List Items Addressed This Visit       Other chronic pain    Relevant Medications    oxyCODONE-acetaminophen (PERCOCET) 5-325 MG per tablet (Start on 3/4/2023)    Encounter for long-term opiate analgesic use - Primary    Rheumatoid arthritis involving multiple sites with positive rheumatoid factor (HCC)    Relevant Medications    oxyCODONE-acetaminophen (PERCOCET) 5-325 MG per tablet (Start on 3/4/2023)    Inflammatory arthropathy    Relevant Medications    oxyCODONE-acetaminophen (PERCOCET) 5-325 MG per tablet (Start on 3/4/2023)          Treatment Plan:  Patient relates current medications are helping the pain. Patient reports taking pain medications as prescribed, denies obtaining medications from different sources and denies use of illegal drugs. Medication risk and benefits have been discussed. Patient denies side effects from medications like nausea, vomiting, constipation or drowsiness. Patient reports current activities of daily living are possible due to medications and would like to continue them. As always, we encourage daily stretching and strengthening exercises, and recommend minimizing use of pain medications unless patient cannot get through daily activities due to pain. Due to the high risk nature of this patient's pain medication close monitoring is required. Continue current medication management, pt has been stable and compliant. Script written for percocet  Follow up appointment made for 4 weeks    I have reviewed the chief complaint and history of present illness (including ROS and PFSH) and vital documentation by my staff and I agree with their documentation and have added where applicable.        Martin Charter Vijay Saini, was evaluated through a synchronous (real-time) audio-video encounter. The patient (or guardian if applicable) is aware that this is a billable service, which includes applicable co-pays. This Virtual Visit was conducted with patient's (and/or legal guardian's) consent. The visit was conducted pursuant to the emergency declaration under the [address] [place] [address] and the [employer] General Act. Patient identification was verified, and a caregiver was present when appropriate. The patient was located at Home: [place] [id number]  Provider was located at [place] [address] (Appt Dept): [address] [place] [address],  [address] [place] [address]         Total time spent for this encounter: Not billed by time    --TOMAS Sanchez CNP on 2/23/2023 at 12:05 PM    An electronic signature was used to authenticate this note.

## 2023-03-19 SDOH — ECONOMIC STABILITY: FOOD INSECURITY: WITHIN THE PAST 12 MONTHS, YOU WORRIED THAT YOUR FOOD WOULD RUN OUT BEFORE YOU GOT MONEY TO BUY MORE.: NEVER TRUE

## 2023-03-19 SDOH — ECONOMIC STABILITY: INCOME INSECURITY: HOW HARD IS IT FOR YOU TO PAY FOR THE VERY BASICS LIKE FOOD, HOUSING, MEDICAL CARE, AND HEATING?: NOT HARD AT ALL

## 2023-03-19 SDOH — ECONOMIC STABILITY: HOUSING INSECURITY
IN THE LAST 12 MONTHS, WAS THERE A TIME WHEN YOU DID NOT HAVE A STEADY PLACE TO SLEEP OR SLEPT IN A SHELTER (INCLUDING NOW)?: NO

## 2023-03-19 SDOH — ECONOMIC STABILITY: FOOD INSECURITY: WITHIN THE PAST 12 MONTHS, THE FOOD YOU BOUGHT JUST DIDN'T LAST AND YOU DIDN'T HAVE MONEY TO GET MORE.: NEVER TRUE

## 2023-03-20 ENCOUNTER — OFFICE VISIT (OUTPATIENT)
Dept: PRIMARY CARE CLINIC | Age: 73
End: 2023-03-20
Payer: MEDICARE

## 2023-03-20 VITALS
HEART RATE: 66 BPM | BODY MASS INDEX: 35.08 KG/M2 | RESPIRATION RATE: 16 BRPM | DIASTOLIC BLOOD PRESSURE: 80 MMHG | SYSTOLIC BLOOD PRESSURE: 136 MMHG | OXYGEN SATURATION: 98 % | WEIGHT: 224 LBS

## 2023-03-20 DIAGNOSIS — M05.79 RHEUMATOID ARTHRITIS INVOLVING MULTIPLE SITES WITH POSITIVE RHEUMATOID FACTOR (HCC): ICD-10-CM

## 2023-03-20 DIAGNOSIS — Z13.1 SCREENING FOR DIABETES MELLITUS (DM): ICD-10-CM

## 2023-03-20 DIAGNOSIS — E66.01 SEVERE OBESITY (BMI 35.0-39.9) WITH COMORBIDITY (HCC): ICD-10-CM

## 2023-03-20 DIAGNOSIS — Z13.0 SCREENING, ANEMIA, DEFICIENCY, IRON: ICD-10-CM

## 2023-03-20 DIAGNOSIS — Z12.5 SCREENING FOR MALIGNANT NEOPLASM OF PROSTATE: ICD-10-CM

## 2023-03-20 DIAGNOSIS — I10 ESSENTIAL HYPERTENSION: Primary | ICD-10-CM

## 2023-03-20 DIAGNOSIS — Z13.6 SCREENING FOR CARDIOVASCULAR CONDITION: ICD-10-CM

## 2023-03-20 PROBLEM — M17.11 PRIMARY OSTEOARTHRITIS OF RIGHT KNEE: Status: ACTIVE | Noted: 2022-11-30

## 2023-03-20 PROCEDURE — G8417 CALC BMI ABV UP PARAM F/U: HCPCS | Performed by: NURSE PRACTITIONER

## 2023-03-20 PROCEDURE — G8427 DOCREV CUR MEDS BY ELIG CLIN: HCPCS | Performed by: NURSE PRACTITIONER

## 2023-03-20 PROCEDURE — 3074F SYST BP LT 130 MM HG: CPT | Performed by: NURSE PRACTITIONER

## 2023-03-20 PROCEDURE — 1036F TOBACCO NON-USER: CPT | Performed by: NURSE PRACTITIONER

## 2023-03-20 PROCEDURE — G8484 FLU IMMUNIZE NO ADMIN: HCPCS | Performed by: NURSE PRACTITIONER

## 2023-03-20 PROCEDURE — 1123F ACP DISCUSS/DSCN MKR DOCD: CPT | Performed by: NURSE PRACTITIONER

## 2023-03-20 PROCEDURE — 3017F COLORECTAL CA SCREEN DOC REV: CPT | Performed by: NURSE PRACTITIONER

## 2023-03-20 PROCEDURE — 99214 OFFICE O/P EST MOD 30 MIN: CPT | Performed by: NURSE PRACTITIONER

## 2023-03-20 PROCEDURE — 3078F DIAST BP <80 MM HG: CPT | Performed by: NURSE PRACTITIONER

## 2023-03-20 ASSESSMENT — PATIENT HEALTH QUESTIONNAIRE - PHQ9
SUM OF ALL RESPONSES TO PHQ QUESTIONS 1-9: 0
SUM OF ALL RESPONSES TO PHQ QUESTIONS 1-9: 0
SUM OF ALL RESPONSES TO PHQ9 QUESTIONS 1 & 2: 0
2. FEELING DOWN, DEPRESSED OR HOPELESS: 0
SUM OF ALL RESPONSES TO PHQ QUESTIONS 1-9: 0
1. LITTLE INTEREST OR PLEASURE IN DOING THINGS: 0
SUM OF ALL RESPONSES TO PHQ QUESTIONS 1-9: 0

## 2023-03-20 NOTE — PROGRESS NOTES
[place]       Family History   Problem Relation Age of Onset    Alcohol Abuse Father     Heart Attack Father     Heart Disease Father     Heart Attack Brother     Heart Attack Brother        Social History     Tobacco Use    Smoking status: Former     Packs/day: 1.50     Years: 25.00     Pack years: 37.50     Types: Cigarettes     Start date: 1965     Quit date: 1980     Years since quittin.2    Smokeless tobacco: Never    Tobacco comments:     estimated quit date   Substance Use Topics    Alcohol use: No      Current Outpatient Medications   Medication Sig Dispense Refill    oxyCODONE-acetaminophen (PERCOCET) 5-325 MG per tablet Take 1 tablet by mouth every 8 hours as needed for Pain for up to 30 days. 90 tablet 0    atorvastatin (LIPITOR) 20 MG tablet TAKE 1 TABLET BY MOUTH EVERY DAY 90 tablet 0    sildenafil (VIAGRA) 100 MG tablet Take 1 tablet by mouth daily as needed for Erectile Dysfunction 30 tablet 2    enalapril (VASOTEC) 10 MG tablet TAKE 1 TABLET BY MOUTH EVERY DAY 90 tablet 1    amLODIPine (NORVASC) 10 MG tablet TAKE 1 TABLET BY MOUTH EVERY DAY 90 tablet 1    omeprazole (PRILOSEC) 20 MG delayed release capsule TAKE 1 CAPSULE BY MOUTH 30 minutes before morning meal      metoprolol tartrate (LOPRESSOR) 25 MG tablet TAKE 1 TABLET BY MOUTH TWO TIMES A DAY **DUE TO APPOINTMENT 180 tablet 1    predniSONE (DELTASONE) 5 MG tablet TAKE 1 TABLET BY MOUTH ONCE A DAY      ELDERBERRY PO Take 2 tablets by mouth daily      Multiple Vitamins-Minerals (PRESERVISION/LUTEIN PO) Take 2 capsules by mouth daily      folic acid (FOLVITE) 1 MG tablet daily      methotrexate (RHEUMATREX) 2.5 MG chemo tablet Take 2.5 mg by mouth 4 tablets weekly      Multiple Vitamins-Minerals (MULTIVITAMIN PO) Take  by mouth. CALCIUM PO Take  by mouth. No current facility-administered medications for this visit.      No Known Allergies    Health Maintenance   Topic Date Due    Lipids  2023    Shingles vaccine (1

## 2023-03-23 ENCOUNTER — OFFICE VISIT (OUTPATIENT)
Dept: PAIN MANAGEMENT | Age: 73
End: 2023-03-23
Payer: MEDICARE

## 2023-03-23 VITALS — BODY MASS INDEX: 35.16 KG/M2 | WEIGHT: 224 LBS | HEIGHT: 67 IN

## 2023-03-23 DIAGNOSIS — M19.90 INFLAMMATORY ARTHROPATHY: ICD-10-CM

## 2023-03-23 DIAGNOSIS — G89.29 OTHER CHRONIC PAIN: ICD-10-CM

## 2023-03-23 PROCEDURE — 1036F TOBACCO NON-USER: CPT | Performed by: NURSE PRACTITIONER

## 2023-03-23 PROCEDURE — G8484 FLU IMMUNIZE NO ADMIN: HCPCS | Performed by: NURSE PRACTITIONER

## 2023-03-23 PROCEDURE — 1123F ACP DISCUSS/DSCN MKR DOCD: CPT | Performed by: NURSE PRACTITIONER

## 2023-03-23 PROCEDURE — G8417 CALC BMI ABV UP PARAM F/U: HCPCS | Performed by: NURSE PRACTITIONER

## 2023-03-23 PROCEDURE — 99213 OFFICE O/P EST LOW 20 MIN: CPT | Performed by: NURSE PRACTITIONER

## 2023-03-23 PROCEDURE — 3017F COLORECTAL CA SCREEN DOC REV: CPT | Performed by: NURSE PRACTITIONER

## 2023-03-23 PROCEDURE — G8427 DOCREV CUR MEDS BY ELIG CLIN: HCPCS | Performed by: NURSE PRACTITIONER

## 2023-03-23 RX ORDER — OXYCODONE HYDROCHLORIDE AND ACETAMINOPHEN 5; 325 MG/1; MG/1
1 TABLET ORAL EVERY 8 HOURS PRN
Qty: 90 TABLET | Refills: 0 | Status: SHIPPED | OUTPATIENT
Start: 2023-04-04 | End: 2023-05-04

## 2023-03-23 ASSESSMENT — ENCOUNTER SYMPTOMS
COUGH: 0
SHORTNESS OF BREATH: 0
BACK PAIN: 1
CONSTIPATION: 0

## 2023-03-23 NOTE — PROGRESS NOTES
Abused: No    Physically Abused: No    Sexually Abused: No   Housing Stability: Unknown    Unable to Pay for Housing in the Last Year: Not on file    Number of Places Lived in the Last Year: Not on file    Unstable Housing in the Last Year: No       Review of Systems:  Review of Systems   Constitutional: Negative for chills and fever. Cardiovascular:  Negative for chest pain. Respiratory:  Negative for cough and shortness of breath. Musculoskeletal:  Positive for back pain. Gastrointestinal:  Negative for constipation. Neurological:  Negative for headaches, numbness, tingling and weakness. Physical Exam:  Ht 5' 7\" (1.702 m)   Wt 224 lb (101.6 kg)   BMI 35.08 kg/m²     Physical Exam  Cardiovascular:      Rate and Rhythm: Normal rate. Pulmonary:      Effort: Pulmonary effort is normal.   Musculoskeletal:         General: Normal range of motion. Skin:     General: Skin is warm and dry. Neurological:      Mental Status: He is alert and oriented to person, place, and time. Record/Diagnostics Review:    Last demetrice 10/22   and was appropriate     Assessment:  Problem List Items Addressed This Visit       Other chronic pain    Relevant Medications    oxyCODONE-acetaminophen (PERCOCET) 5-325 MG per tablet (Start on 4/4/2023)    Inflammatory arthropathy    Relevant Medications    oxyCODONE-acetaminophen (PERCOCET) 5-325 MG per tablet (Start on 4/4/2023)          Treatment Plan:  Patient relates current medications are helping the pain. Patient reports taking pain medications as prescribed, denies obtaining medications from different sources and denies use of illegal drugs. Medication risk and benefits have been discussed. Patient denies side effects from medications like nausea, vomiting, constipation or drowsiness. Patient reports current activities of daily living are possible due to medications and would like to continue them.      As always, we encourage daily stretching and strengthening

## 2023-03-24 ASSESSMENT — ENCOUNTER SYMPTOMS
SHORTNESS OF BREATH: 0
DIARRHEA: 0
COLOR CHANGE: 0
NAUSEA: 0
VOMITING: 0
ABDOMINAL PAIN: 0
CHEST TIGHTNESS: 0
RHINORRHEA: 0
SORE THROAT: 0

## 2023-03-27 ENCOUNTER — HOSPITAL ENCOUNTER (OUTPATIENT)
Age: 73
Setting detail: SPECIMEN
Discharge: HOME | End: 2023-03-27

## 2023-03-27 DIAGNOSIS — Z13.1 SCREENING FOR DIABETES MELLITUS (DM): ICD-10-CM

## 2023-03-27 DIAGNOSIS — I10 ESSENTIAL HYPERTENSION: ICD-10-CM

## 2023-03-27 DIAGNOSIS — Z13.0 SCREENING, ANEMIA, DEFICIENCY, IRON: ICD-10-CM

## 2023-03-27 DIAGNOSIS — Z13.6 SCREENING FOR CARDIOVASCULAR CONDITION: ICD-10-CM

## 2023-03-27 DIAGNOSIS — Z12.5 SCREENING FOR MALIGNANT NEOPLASM OF PROSTATE: ICD-10-CM

## 2023-03-27 LAB
ABSOLUTE EOS #: 0.29 K/UL (ref 0–0.44)
ABSOLUTE IMMATURE GRANULOCYTE: 0.03 K/UL (ref 0–0.3)
ABSOLUTE LYMPH #: 2.64 K/UL (ref 1.1–3.7)
ABSOLUTE MONO #: 0.72 K/UL (ref 0.1–1.2)
ALBUMIN SERPL-MCNC: 3.9 G/DL (ref 3.5–5.2)
ALBUMIN/GLOBULIN RATIO: 1.4 (ref 1–2.5)
ALP SERPL-CCNC: 85 U/L (ref 40–129)
ALT SERPL-CCNC: 17 U/L (ref 5–41)
ANION GAP SERPL CALCULATED.3IONS-SCNC: 9 MMOL/L (ref 9–17)
AST SERPL-CCNC: 18 U/L
BASOPHILS # BLD: 1 % (ref 0–2)
BASOPHILS ABSOLUTE: 0.08 K/UL (ref 0–0.2)
BILIRUB SERPL-MCNC: 0.2 MG/DL (ref 0.3–1.2)
BUN SERPL-MCNC: 11 MG/DL (ref 8–23)
CALCIUM SERPL-MCNC: 9.6 MG/DL (ref 8.6–10.4)
CHLORIDE SERPL-SCNC: 106 MMOL/L (ref 98–107)
CHOLEST SERPL-MCNC: 128 MG/DL
CHOLESTEROL/HDL RATIO: 3.5
CO2 SERPL-SCNC: 29 MMOL/L (ref 20–31)
CREAT SERPL-MCNC: 0.92 MG/DL (ref 0.7–1.2)
EOSINOPHILS RELATIVE PERCENT: 4 % (ref 1–4)
GFR SERPL CREATININE-BSD FRML MDRD: >60 ML/MIN/1.73M2
GLUCOSE SERPL-MCNC: 113 MG/DL (ref 70–99)
HCT VFR BLD AUTO: 40.6 % (ref 40.7–50.3)
HDLC SERPL-MCNC: 37 MG/DL
HGB BLD-MCNC: 12.7 G/DL (ref 13–17)
IMMATURE GRANULOCYTES: 0 %
LDLC SERPL CALC-MCNC: 67 MG/DL (ref 0–130)
LYMPHOCYTES # BLD: 37 % (ref 24–43)
MCH RBC QN AUTO: 27.7 PG (ref 25.2–33.5)
MCHC RBC AUTO-ENTMCNC: 31.3 G/DL (ref 28.4–34.8)
MCV RBC AUTO: 88.6 FL (ref 82.6–102.9)
MONOCYTES # BLD: 10 % (ref 3–12)
NRBC AUTOMATED: 0 PER 100 WBC
PDW BLD-RTO: 14.1 % (ref 11.8–14.4)
PLATELET # BLD AUTO: 232 K/UL (ref 138–453)
PMV BLD AUTO: 10.4 FL (ref 8.1–13.5)
POTASSIUM SERPL-SCNC: 4.8 MMOL/L (ref 3.7–5.3)
PROSTATE SPECIFIC ANTIGEN: 1.26 NG/ML
PROT SERPL-MCNC: 6.7 G/DL (ref 6.4–8.3)
RBC # BLD: 4.58 M/UL (ref 4.21–5.77)
SEG NEUTROPHILS: 48 % (ref 36–65)
SEGMENTED NEUTROPHILS ABSOLUTE COUNT: 3.32 K/UL (ref 1.5–8.1)
SODIUM SERPL-SCNC: 144 MMOL/L (ref 135–144)
TRIGL SERPL-MCNC: 122 MG/DL
WBC # BLD AUTO: 7.1 K/UL (ref 3.5–11.3)

## 2023-04-20 DIAGNOSIS — I10 HYPERTENSION, UNSPECIFIED TYPE: ICD-10-CM

## 2023-04-27 ENCOUNTER — TELEMEDICINE (OUTPATIENT)
Dept: PAIN MANAGEMENT | Age: 73
End: 2023-04-27
Payer: MEDICARE

## 2023-04-27 DIAGNOSIS — M05.79 RHEUMATOID ARTHRITIS INVOLVING MULTIPLE SITES WITH POSITIVE RHEUMATOID FACTOR (HCC): Primary | ICD-10-CM

## 2023-04-27 DIAGNOSIS — Z79.891 ENCOUNTER FOR LONG-TERM OPIATE ANALGESIC USE: ICD-10-CM

## 2023-04-27 DIAGNOSIS — M19.90 INFLAMMATORY ARTHROPATHY: ICD-10-CM

## 2023-04-27 DIAGNOSIS — G89.29 OTHER CHRONIC PAIN: ICD-10-CM

## 2023-04-27 PROCEDURE — G8417 CALC BMI ABV UP PARAM F/U: HCPCS | Performed by: NURSE PRACTITIONER

## 2023-04-27 PROCEDURE — 3017F COLORECTAL CA SCREEN DOC REV: CPT | Performed by: NURSE PRACTITIONER

## 2023-04-27 PROCEDURE — 1123F ACP DISCUSS/DSCN MKR DOCD: CPT | Performed by: NURSE PRACTITIONER

## 2023-04-27 PROCEDURE — G8427 DOCREV CUR MEDS BY ELIG CLIN: HCPCS | Performed by: NURSE PRACTITIONER

## 2023-04-27 PROCEDURE — 99213 OFFICE O/P EST LOW 20 MIN: CPT | Performed by: NURSE PRACTITIONER

## 2023-04-27 PROCEDURE — 1036F TOBACCO NON-USER: CPT | Performed by: NURSE PRACTITIONER

## 2023-04-27 RX ORDER — OXYCODONE HYDROCHLORIDE AND ACETAMINOPHEN 5; 325 MG/1; MG/1
1 TABLET ORAL EVERY 8 HOURS PRN
Qty: 90 TABLET | Refills: 0 | Status: SHIPPED | OUTPATIENT
Start: 2023-05-04 | End: 2023-06-03

## 2023-04-27 RX ORDER — DICLOFENAC SODIUM 75 MG/1
75 TABLET, DELAYED RELEASE ORAL 2 TIMES DAILY PRN
COMMUNITY
Start: 2023-04-14

## 2023-04-27 ASSESSMENT — ENCOUNTER SYMPTOMS
CONSTIPATION: 0
SHORTNESS OF BREATH: 0
BACK PAIN: 1
BOWEL INCONTINENCE: 0
COUGH: 0

## 2023-04-27 NOTE — PROGRESS NOTES
Chief Complaint   Patient presents with    Back Pain    Medication Refill         [facility]     Patient complains of diffuse joint pain. Hx of rheumatoid arthritis and follows with rheumatologist. Currently prescribed diclofenac and states his methotrexate was d/c. He also takes percocet 5/325 TID for pain from our office. He has been stable and compliant on this dose for many years. He has had surgery on both shoulders and both knees. Most recent was right TKR done 12/22 with Dr Mel Ruiz. Has completed PT with some relief    Back Pain  This is a chronic problem. The current episode started more than 1 year ago. The problem occurs constantly. The problem is unchanged. The pain is present in the lumbar spine. The quality of the pain is described as aching. The pain does not radiate. The pain is at a severity of 4/10. The pain is moderate. The pain is The same all the time. The symptoms are aggravated by position, twisting, bending and sitting. Pertinent negatives include no bladder incontinence, bowel incontinence, chest pain or fever. He has tried home exercises, bed rest, walking, chiropractic manipulation, heat, ice and NSAIDs for the symptoms. The treatment provided mild relief. Patient denies any new neurological symptoms. No bowel or bladder incontinence, no weakness, and no falling. Pill count: appropriate    Morphine equivalent: 22.5 5/4    Controlled Substance Monitoring:    Acute and Chronic Pain Monitoring:   RX Monitoring 4/27/2023   Attestation -   Periodic Controlled Substance Monitoring Possible medication side effects, risk of tolerance/dependence & alternative treatments discussed. ;No signs of potential drug abuse or diversion identified. ;Assessed functional status. ;Obtaining appropriate analgesic effect of treatment.           Periodic Controlled Substance Monitoring: Possible medication side effects, risk of tolerance/dependence & alternative treatments discussed., No signs of

## 2023-05-08 DIAGNOSIS — I10 HYPERTENSION, UNSPECIFIED TYPE: ICD-10-CM

## 2023-05-08 RX ORDER — AMLODIPINE BESYLATE 10 MG/1
TABLET ORAL
Qty: 90 TABLET | Refills: 1 | Status: SHIPPED | OUTPATIENT
Start: 2023-05-08

## 2023-05-08 RX ORDER — ENALAPRIL MALEATE 10 MG/1
TABLET ORAL
Qty: 90 TABLET | Refills: 1 | Status: SHIPPED | OUTPATIENT
Start: 2023-05-08

## 2023-05-14 RX ORDER — ATORVASTATIN CALCIUM 20 MG/1
TABLET, FILM COATED ORAL
Qty: 90 TABLET | Refills: 0 | Status: SHIPPED | OUTPATIENT
Start: 2023-05-14

## 2023-06-01 ENCOUNTER — TELEMEDICINE (OUTPATIENT)
Dept: PAIN MANAGEMENT | Age: 73
End: 2023-06-01
Payer: MEDICARE

## 2023-06-01 DIAGNOSIS — M19.90 INFLAMMATORY ARTHROPATHY: ICD-10-CM

## 2023-06-01 DIAGNOSIS — M05.79 RHEUMATOID ARTHRITIS INVOLVING MULTIPLE SITES WITH POSITIVE RHEUMATOID FACTOR (HCC): ICD-10-CM

## 2023-06-01 DIAGNOSIS — G89.29 OTHER CHRONIC PAIN: ICD-10-CM

## 2023-06-01 DIAGNOSIS — Z79.891 CHRONIC USE OF OPIATE FOR THERAPEUTIC PURPOSE: Primary | ICD-10-CM

## 2023-06-01 PROCEDURE — 99213 OFFICE O/P EST LOW 20 MIN: CPT | Performed by: NURSE PRACTITIONER

## 2023-06-01 PROCEDURE — 3017F COLORECTAL CA SCREEN DOC REV: CPT | Performed by: NURSE PRACTITIONER

## 2023-06-01 PROCEDURE — 1123F ACP DISCUSS/DSCN MKR DOCD: CPT | Performed by: NURSE PRACTITIONER

## 2023-06-01 PROCEDURE — G8417 CALC BMI ABV UP PARAM F/U: HCPCS | Performed by: NURSE PRACTITIONER

## 2023-06-01 PROCEDURE — 1036F TOBACCO NON-USER: CPT | Performed by: NURSE PRACTITIONER

## 2023-06-01 PROCEDURE — G8427 DOCREV CUR MEDS BY ELIG CLIN: HCPCS | Performed by: NURSE PRACTITIONER

## 2023-06-01 RX ORDER — OXYCODONE HYDROCHLORIDE AND ACETAMINOPHEN 5; 325 MG/1; MG/1
1 TABLET ORAL EVERY 8 HOURS PRN
Qty: 90 TABLET | Refills: 0 | Status: SHIPPED | OUTPATIENT
Start: 2023-06-03 | End: 2023-07-03

## 2023-06-01 ASSESSMENT — ENCOUNTER SYMPTOMS
COUGH: 0
SHORTNESS OF BREATH: 0
BACK PAIN: 1
CONSTIPATION: 0

## 2023-06-01 NOTE — PROGRESS NOTES
Chief Complaint   Patient presents with    Back Pain    Medication Refill         [facility]     Patient complains of diffuse joint pain. Hx of rheumatoid arthritis and follows with rheumatologist. Currently prescribed diclofenac and states his methotrexate was d/c. He also takes percocet 5/325 TID for pain from our office. He has been stable and compliant on this dose for many years. He has had surgery on both shoulders and both knees. Most recent was right TKR done 12/22 with Dr Didi Mckay. Has completed PT with some relief    HPI  Back Pain  This is a chronic problem. The current episode started more than 1 year ago. The problem occurs constantly. The problem is unchanged. The pain is present in the lumbar spine (knees). The quality of the pain is described as stabbing. The pain does not radiate. The pain is at a severity of 6/10. The pain is moderate. The pain is Worse during the day. The symptoms are aggravated by sitting, standing, position, twisting, bending and lying down. Stiffness is present All day. Pertinent negatives include no chest pain, fever, headaches, numbness, tingling or weakness. Risk factors include lack of exercise. He has tried bed rest, home exercises, heat, ice, walking, chiropractic manipulation, muscle relaxant and analgesics for the symptoms. Patient denies any new neurological symptoms. No bowel or bladder incontinence, no weakness, and no falling. Pill count: appropriate 6/3    Morphine equivalent: 22.5    Controlled Substance Monitoring:    Acute and Chronic Pain Monitoring:   RX Monitoring 6/1/2023   Attestation -   Periodic Controlled Substance Monitoring Possible medication side effects, risk of tolerance/dependence & alternative treatments discussed. ;No signs of potential drug abuse or diversion identified. ;Assessed functional status. ;Obtaining appropriate analgesic effect of treatment.           Periodic Controlled Substance Monitoring: Possible medication side

## 2023-06-30 ENCOUNTER — HOSPITAL ENCOUNTER (OUTPATIENT)
Age: 73
Setting detail: SPECIMEN
Discharge: HOME | End: 2023-06-30

## 2023-06-30 ENCOUNTER — OFFICE VISIT (OUTPATIENT)
Dept: PAIN MANAGEMENT | Age: 73
End: 2023-06-30
Payer: MEDICARE

## 2023-06-30 VITALS — WEIGHT: 214 LBS | HEIGHT: 67 IN | BODY MASS INDEX: 33.59 KG/M2

## 2023-06-30 DIAGNOSIS — G89.29 OTHER CHRONIC PAIN: ICD-10-CM

## 2023-06-30 DIAGNOSIS — M19.90 INFLAMMATORY ARTHROPATHY: ICD-10-CM

## 2023-06-30 DIAGNOSIS — Z79.891 CHRONIC USE OF OPIATE FOR THERAPEUTIC PURPOSE: Primary | ICD-10-CM

## 2023-06-30 PROCEDURE — G8417 CALC BMI ABV UP PARAM F/U: HCPCS | Performed by: NURSE PRACTITIONER

## 2023-06-30 PROCEDURE — 99213 OFFICE O/P EST LOW 20 MIN: CPT | Performed by: NURSE PRACTITIONER

## 2023-06-30 PROCEDURE — 1123F ACP DISCUSS/DSCN MKR DOCD: CPT | Performed by: NURSE PRACTITIONER

## 2023-06-30 PROCEDURE — 1036F TOBACCO NON-USER: CPT | Performed by: NURSE PRACTITIONER

## 2023-06-30 PROCEDURE — G8427 DOCREV CUR MEDS BY ELIG CLIN: HCPCS | Performed by: NURSE PRACTITIONER

## 2023-06-30 PROCEDURE — 3017F COLORECTAL CA SCREEN DOC REV: CPT | Performed by: NURSE PRACTITIONER

## 2023-06-30 RX ORDER — OXYCODONE HYDROCHLORIDE AND ACETAMINOPHEN 5; 325 MG/1; MG/1
1 TABLET ORAL EVERY 8 HOURS PRN
Qty: 90 TABLET | Refills: 0 | Status: SHIPPED | OUTPATIENT
Start: 2023-07-03 | End: 2023-08-02

## 2023-06-30 ASSESSMENT — ENCOUNTER SYMPTOMS
SHORTNESS OF BREATH: 0
BOWEL INCONTINENCE: 0
COUGH: 0
CONSTIPATION: 0
BACK PAIN: 1

## 2023-07-01 DIAGNOSIS — Z79.891 CHRONIC USE OF OPIATE FOR THERAPEUTIC PURPOSE: ICD-10-CM

## 2023-07-06 LAB
6-ACETYLMORPHINE, UR: NOT DETECTED
7-AMINOCLONAZEPAM, URINE: NOT DETECTED
ALPHA-OH-ALPRAZ, URINE: NOT DETECTED
ALPHA-OH-MIDAZOLAM, URINE: NOT DETECTED
ALPRAZOLAM, URINE: NOT DETECTED
AMPHETAMINES, URINE: NOT DETECTED
BARBITURATES, URINE: NOT DETECTED
BENZOYLECGONINE, UR: NOT DETECTED
BUPRENORPHINE URINE: NOT DETECTED
CARISOPRODOL, UR: NOT DETECTED
CLONAZEPAM, URINE: NOT DETECTED
CODEINE, URINE: NOT DETECTED
CREATININE URINE: 20.3 MG/DL (ref 20–400)
DIAZEPAM, URINE: NOT DETECTED
DRUGS EXPECTED, UR: NORMAL
EER HI RES INTERP UR: NORMAL
ETHYL GLUCURONIDE UR: NOT DETECTED
FENTANYL URINE: NOT DETECTED
GABAPENTIN: NOT DETECTED
HYDROCODONE, URINE: NOT DETECTED
HYDROMORPHONE, URINE: NOT DETECTED
LORAZEPAM, URINE: NOT DETECTED
MARIJUANA METAB, UR: NOT DETECTED
MDA, UR: NOT DETECTED
MDEA, EVE, UR: NOT DETECTED
MDMA URINE: NOT DETECTED
MEPERIDINE METAB, UR: NOT DETECTED
METHADONE, URINE: NOT DETECTED
METHAMPHETAMINE, URINE: NOT DETECTED
METHYLPHENIDATE: NOT DETECTED
MIDAZOLAM, URINE: NOT DETECTED
MORPHINE URINE: NOT DETECTED
NALOXONE URINE: NOT DETECTED
NORBUPRENORPHINE, URINE: NOT DETECTED
NORDIAZEPAM, URINE: NOT DETECTED
NORFENTANYL, URINE: NOT DETECTED
NORHYDROCODONE, URINE: NOT DETECTED
NOROXYCODONE, URINE: PRESENT
NOROXYMORPHONE, URINE: NOT DETECTED
OXAZEPAM, URINE: NOT DETECTED
OXYCODONE URINE: PRESENT
OXYMORPHONE, URINE: PRESENT
PAIN MANAGEMENT DRUG PANEL INTERP, URINE: NORMAL
PAIN MGT DRUG PANEL, HI RES, UR: NORMAL
PCP,URINE: NOT DETECTED
PHENTERMINE, UR: NOT DETECTED
PREGABALIN: NOT DETECTED
TAPENTADOL, URINE: NOT DETECTED
TAPENTADOL-O-SULFATE, URINE: NOT DETECTED
TEMAZEPAM, URINE: NOT DETECTED
TRAMADOL, URINE: NOT DETECTED
ZOLPIDEM METABOLITE (ZCA), URINE: NOT DETECTED
ZOLPIDEM, URINE: NOT DETECTED

## 2023-07-28 ENCOUNTER — OFFICE VISIT (OUTPATIENT)
Dept: PAIN MANAGEMENT | Age: 73
End: 2023-07-28
Payer: MEDICARE

## 2023-07-28 VITALS — BODY MASS INDEX: 33.59 KG/M2 | WEIGHT: 214 LBS | HEIGHT: 67 IN

## 2023-07-28 DIAGNOSIS — Z79.891 CHRONIC USE OF OPIATE FOR THERAPEUTIC PURPOSE: ICD-10-CM

## 2023-07-28 DIAGNOSIS — M19.90 INFLAMMATORY ARTHROPATHY: ICD-10-CM

## 2023-07-28 DIAGNOSIS — Z79.891 ENCOUNTER FOR LONG-TERM OPIATE ANALGESIC USE: Primary | ICD-10-CM

## 2023-07-28 DIAGNOSIS — G89.29 OTHER CHRONIC PAIN: ICD-10-CM

## 2023-07-28 DIAGNOSIS — M05.79 RHEUMATOID ARTHRITIS INVOLVING MULTIPLE SITES WITH POSITIVE RHEUMATOID FACTOR (HCC): ICD-10-CM

## 2023-07-28 PROCEDURE — G8417 CALC BMI ABV UP PARAM F/U: HCPCS | Performed by: NURSE PRACTITIONER

## 2023-07-28 PROCEDURE — 1123F ACP DISCUSS/DSCN MKR DOCD: CPT | Performed by: NURSE PRACTITIONER

## 2023-07-28 PROCEDURE — 99213 OFFICE O/P EST LOW 20 MIN: CPT | Performed by: NURSE PRACTITIONER

## 2023-07-28 PROCEDURE — 3017F COLORECTAL CA SCREEN DOC REV: CPT | Performed by: NURSE PRACTITIONER

## 2023-07-28 PROCEDURE — 1036F TOBACCO NON-USER: CPT | Performed by: NURSE PRACTITIONER

## 2023-07-28 PROCEDURE — G8427 DOCREV CUR MEDS BY ELIG CLIN: HCPCS | Performed by: NURSE PRACTITIONER

## 2023-07-28 RX ORDER — OXYCODONE HYDROCHLORIDE AND ACETAMINOPHEN 5; 325 MG/1; MG/1
1 TABLET ORAL EVERY 8 HOURS PRN
Qty: 90 TABLET | Refills: 0 | Status: SHIPPED | OUTPATIENT
Start: 2023-08-02 | End: 2023-09-01

## 2023-07-28 ASSESSMENT — ENCOUNTER SYMPTOMS
COUGH: 0
CONSTIPATION: 0
SHORTNESS OF BREATH: 0
BOWEL INCONTINENCE: 0

## 2023-07-28 NOTE — PROGRESS NOTES
Chief Complaint   Patient presents with    Back Pain    Medication Refill         [facility]   Patient complains of diffuse joint pain. Hx of rheumatoid arthritis and follows with rheumatologist. Currently prescribed diclofenac and states his methotrexate was d/c. He also takes percocet 5/325 TID for pain from our office. He has been stable and compliant on this dose for many years. He has had surgery on both shoulders and both knees. Most recent was right TKR done 12/22 with Dr Sree Wong. Has completed PT with some relief. Back Pain  This is a chronic problem. The current episode started more than 1 year ago. The problem occurs intermittently. The problem is unchanged. The pain is present in the lumbar spine. The quality of the pain is described as aching. The pain does not radiate. The pain is at a severity of 3/10. The pain is mild. The pain is The same all the time. The symptoms are aggravated by position, twisting, bending, standing and coughing. Pertinent negatives include no bladder incontinence, bowel incontinence, chest pain or fever. He has tried chiropractic manipulation, home exercises, bed rest, walking, heat, ice, analgesics, NSAIDs and muscle relaxant for the symptoms. The treatment provided moderate relief. Patient denies any new neurological symptoms. No bowel or bladder incontinence, no weakness, and no falling. Pill count: Not present with patient due 8/2 - pt reminded to bring pills to every appt. Morphine equivalent: 22.5    Controlled Substance Monitoring:    Acute and Chronic Pain Monitoring:   RX Monitoring 7/28/2023   Attestation -   Periodic Controlled Substance Monitoring Possible medication side effects, risk of tolerance/dependence & alternative treatments discussed. ;No signs of potential drug abuse or diversion identified.;Obtaining appropriate analgesic effect of treatment.             Past Medical History:   Diagnosis Date    Calcium pyrophosphate deposition

## 2023-08-30 ENCOUNTER — OFFICE VISIT (OUTPATIENT)
Dept: PAIN MANAGEMENT | Age: 73
End: 2023-08-30
Payer: MEDICARE

## 2023-08-30 VITALS — HEIGHT: 67 IN | BODY MASS INDEX: 33.59 KG/M2 | WEIGHT: 214 LBS

## 2023-08-30 DIAGNOSIS — M06.9 RHEUMATOID ARTHRITIS, INVOLVING UNSPECIFIED SITE, UNSPECIFIED WHETHER RHEUMATOID FACTOR PRESENT (HCC): ICD-10-CM

## 2023-08-30 DIAGNOSIS — Z79.891 ENCOUNTER FOR LONG-TERM OPIATE ANALGESIC USE: ICD-10-CM

## 2023-08-30 DIAGNOSIS — M19.90 INFLAMMATORY ARTHROPATHY: ICD-10-CM

## 2023-08-30 DIAGNOSIS — M05.79 RHEUMATOID ARTHRITIS INVOLVING MULTIPLE SITES WITH POSITIVE RHEUMATOID FACTOR (HCC): Primary | ICD-10-CM

## 2023-08-30 DIAGNOSIS — G89.29 OTHER CHRONIC PAIN: ICD-10-CM

## 2023-08-30 PROCEDURE — G8427 DOCREV CUR MEDS BY ELIG CLIN: HCPCS | Performed by: NURSE PRACTITIONER

## 2023-08-30 PROCEDURE — 3017F COLORECTAL CA SCREEN DOC REV: CPT | Performed by: NURSE PRACTITIONER

## 2023-08-30 PROCEDURE — G8417 CALC BMI ABV UP PARAM F/U: HCPCS | Performed by: NURSE PRACTITIONER

## 2023-08-30 PROCEDURE — 1036F TOBACCO NON-USER: CPT | Performed by: NURSE PRACTITIONER

## 2023-08-30 PROCEDURE — 99213 OFFICE O/P EST LOW 20 MIN: CPT | Performed by: NURSE PRACTITIONER

## 2023-08-30 PROCEDURE — 1123F ACP DISCUSS/DSCN MKR DOCD: CPT | Performed by: NURSE PRACTITIONER

## 2023-08-30 RX ORDER — OXYCODONE HYDROCHLORIDE AND ACETAMINOPHEN 5; 325 MG/1; MG/1
1 TABLET ORAL EVERY 8 HOURS PRN
Qty: 90 TABLET | Refills: 0 | Status: SHIPPED | OUTPATIENT
Start: 2023-09-01 | End: 2023-10-01

## 2023-08-30 ASSESSMENT — ENCOUNTER SYMPTOMS
BACK PAIN: 1
CONSTIPATION: 0
COUGH: 0
BOWEL INCONTINENCE: 0
SHORTNESS OF BREATH: 0

## 2023-08-30 NOTE — PROGRESS NOTES
Chief Complaint   Patient presents with    Back Pain    Medication Refill         [facility]   Patient complains of diffuse joint pain. Hx of rheumatoid arthritis and follows with rheumatologist. Currently prescribed diclofenac and states his methotrexate was d/c. He also takes percocet 5/325 TID for pain from our office. He has been stable and compliant on this dose for many years. He has had surgery on both shoulders and both knees. Most recent was right TKR done 12/22 with Dr Terry Elmore. Has completed PT with some relief. Back Pain  This is a chronic problem. The current episode started more than 1 year ago. The problem occurs intermittently. The problem has been gradually worsening since onset. The pain is present in the lumbar spine. The quality of the pain is described as aching. The pain does not radiate. The pain is at a severity of 6/10. The pain is moderate. The pain is Worse during the day. The symptoms are aggravated by bending, position, twisting and sitting. Stiffness is present In the morning. Pertinent negatives include no bladder incontinence, bowel incontinence, chest pain or fever. He has tried home exercises, bed rest, walking, chiropractic manipulation, heat, ice and muscle relaxant for the symptoms. The treatment provided moderate relief. Patient denies any new neurological symptoms. No bowel or bladder incontinence, no weakness, and no falling. Pill count: 21 present with patient - due 9/1    Morphine equivalent: 22.5    Controlled Substance Monitoring:    Acute and Chronic Pain Monitoring:   RX Monitoring 8/30/2023   Attestation -   Periodic Controlled Substance Monitoring Possible medication side effects, risk of tolerance/dependence & alternative treatments discussed. ;No signs of potential drug abuse or diversion identified.;Obtaining appropriate analgesic effect of treatment.             Past Medical History:   Diagnosis Date    Calcium pyrophosphate deposition disease

## 2023-09-21 RX ORDER — ATORVASTATIN CALCIUM 20 MG/1
TABLET, FILM COATED ORAL
Qty: 90 TABLET | Refills: 1 | Status: SHIPPED | OUTPATIENT
Start: 2023-09-21

## 2023-09-24 SDOH — HEALTH STABILITY: PHYSICAL HEALTH: ON AVERAGE, HOW MANY DAYS PER WEEK DO YOU ENGAGE IN MODERATE TO STRENUOUS EXERCISE (LIKE A BRISK WALK)?: 3 DAYS

## 2023-09-24 SDOH — HEALTH STABILITY: PHYSICAL HEALTH: ON AVERAGE, HOW MANY MINUTES DO YOU ENGAGE IN EXERCISE AT THIS LEVEL?: 60 MIN

## 2023-09-24 ASSESSMENT — LIFESTYLE VARIABLES
HOW MANY STANDARD DRINKS CONTAINING ALCOHOL DO YOU HAVE ON A TYPICAL DAY: 0
HOW MANY STANDARD DRINKS CONTAINING ALCOHOL DO YOU HAVE ON A TYPICAL DAY: PATIENT DOES NOT DRINK
HOW OFTEN DO YOU HAVE A DRINK CONTAINING ALCOHOL: 1
HOW OFTEN DO YOU HAVE SIX OR MORE DRINKS ON ONE OCCASION: 1
HOW OFTEN DO YOU HAVE A DRINK CONTAINING ALCOHOL: NEVER

## 2023-09-24 ASSESSMENT — PATIENT HEALTH QUESTIONNAIRE - PHQ9
SUM OF ALL RESPONSES TO PHQ QUESTIONS 1-9: 0
SUM OF ALL RESPONSES TO PHQ9 QUESTIONS 1 & 2: 0
SUM OF ALL RESPONSES TO PHQ QUESTIONS 1-9: 0
1. LITTLE INTEREST OR PLEASURE IN DOING THINGS: 0
2. FEELING DOWN, DEPRESSED OR HOPELESS: 0

## 2023-09-26 ENCOUNTER — OFFICE VISIT (OUTPATIENT)
Dept: PRIMARY CARE CLINIC | Age: 73
End: 2023-09-26
Payer: MEDICARE

## 2023-09-26 VITALS
RESPIRATION RATE: 16 BRPM | OXYGEN SATURATION: 96 % | WEIGHT: 213.3 LBS | HEART RATE: 59 BPM | SYSTOLIC BLOOD PRESSURE: 138 MMHG | DIASTOLIC BLOOD PRESSURE: 76 MMHG | HEIGHT: 67 IN | BODY MASS INDEX: 33.48 KG/M2

## 2023-09-26 DIAGNOSIS — Z00.00 MEDICARE ANNUAL WELLNESS VISIT, SUBSEQUENT: Primary | ICD-10-CM

## 2023-09-26 DIAGNOSIS — M05.79 RHEUMATOID ARTHRITIS INVOLVING MULTIPLE SITES WITH POSITIVE RHEUMATOID FACTOR (HCC): ICD-10-CM

## 2023-09-26 DIAGNOSIS — F11.20 OPIOID DEPENDENCE WITH CURRENT USE (HCC): ICD-10-CM

## 2023-09-26 DIAGNOSIS — Z23 NEED FOR INFLUENZA VACCINATION: ICD-10-CM

## 2023-09-26 DIAGNOSIS — I10 ESSENTIAL HYPERTENSION: Chronic | ICD-10-CM

## 2023-09-26 PROBLEM — M11.20 CHONDROCALCINOSIS DUE TO DICALCIUM PHOSPHATE CRYSTALS: Status: ACTIVE | Noted: 2023-09-26

## 2023-09-26 PROBLEM — D50.9 IRON DEFICIENCY ANEMIA: Status: ACTIVE | Noted: 2023-09-26

## 2023-09-26 PROCEDURE — 3078F DIAST BP <80 MM HG: CPT | Performed by: NURSE PRACTITIONER

## 2023-09-26 PROCEDURE — 1123F ACP DISCUSS/DSCN MKR DOCD: CPT | Performed by: NURSE PRACTITIONER

## 2023-09-26 PROCEDURE — G0439 PPPS, SUBSEQ VISIT: HCPCS | Performed by: NURSE PRACTITIONER

## 2023-09-26 PROCEDURE — 3075F SYST BP GE 130 - 139MM HG: CPT | Performed by: NURSE PRACTITIONER

## 2023-09-26 PROCEDURE — G0008 ADMIN INFLUENZA VIRUS VAC: HCPCS | Performed by: NURSE PRACTITIONER

## 2023-09-26 PROCEDURE — 3017F COLORECTAL CA SCREEN DOC REV: CPT | Performed by: NURSE PRACTITIONER

## 2023-09-26 PROCEDURE — 90694 VACC AIIV4 NO PRSRV 0.5ML IM: CPT | Performed by: NURSE PRACTITIONER

## 2023-09-26 NOTE — PROGRESS NOTES
Medicare Annual Wellness Visit    Jr Carranza is here for Medicare AWV    Assessment & Plan   Medicare annual wellness visit, subsequent  Essential hypertension  Opioid dependence with current use ([address])  Rheumatoid arthritis involving multiple sites with positive rheumatoid factor ([address])  Need for influenza vaccination  -     Influenza, FLUAD, (age 72 y+), IM, Preservative Free, 0.5 mL    Recommendations for Preventive Services Due: see orders and patient instructions/AVS.  Recommended screening schedule for the next 5-10 years is provided to the patient in written form: see Patient Instructions/AVS.     Return in about 6 months (around 3/26/2024) for Hypertension. Subjective   The following acute and/or chronic problems were also addressed today:  Presents foe AWV, no concerns   Arthritis generally stable, follows with pain management   HTN well controlled with metoprolol, norvasc and enalapril  Denies CP, SOB, leg swelling or HA   Would like flu vaccine     Patient's complete Health Risk Assessment and screening values have been reviewed and are found in Flowsheets. The following problems were reviewed today and where indicated follow up appointments were made and/or referrals ordered. Positive Risk Factor Screenings with Interventions:                Opioid Risk: (Low risk score <55) Opioid risk score: 8    Patient is low risk for opioid use disorder or overdose. Last PDMP Gigi Cowden as Reviewed:  Review User Review Instant Review Result   Myke LIPSCOMB 8/30/2023  8:08 AM     Reviewed PDMP [1]     Last Controlled Substance Monitoring Documentation      [facility] Visit from 8/30/2023 in [facility]   Periodic Controlled Substance Monitoring Possible medication side effects, risk of tolerance/dependence & alternative treatments discussed., No signs of potential drug abuse or diversion identified., Obtaining appropriate analgesic effect of treatment.  filed at

## 2023-09-27 ENCOUNTER — OFFICE VISIT (OUTPATIENT)
Dept: PAIN MANAGEMENT | Age: 73
End: 2023-09-27
Payer: MEDICARE

## 2023-09-27 VITALS — HEIGHT: 67 IN | WEIGHT: 213 LBS | BODY MASS INDEX: 33.43 KG/M2

## 2023-09-27 DIAGNOSIS — M19.90 INFLAMMATORY ARTHROPATHY: ICD-10-CM

## 2023-09-27 DIAGNOSIS — M05.79 RHEUMATOID ARTHRITIS INVOLVING MULTIPLE SITES WITH POSITIVE RHEUMATOID FACTOR (HCC): Primary | ICD-10-CM

## 2023-09-27 DIAGNOSIS — G89.29 OTHER CHRONIC PAIN: ICD-10-CM

## 2023-09-27 DIAGNOSIS — Z79.891 ENCOUNTER FOR LONG-TERM OPIATE ANALGESIC USE: ICD-10-CM

## 2023-09-27 PROCEDURE — 1036F TOBACCO NON-USER: CPT | Performed by: NURSE PRACTITIONER

## 2023-09-27 PROCEDURE — G8417 CALC BMI ABV UP PARAM F/U: HCPCS | Performed by: NURSE PRACTITIONER

## 2023-09-27 PROCEDURE — 99213 OFFICE O/P EST LOW 20 MIN: CPT | Performed by: NURSE PRACTITIONER

## 2023-09-27 PROCEDURE — 1123F ACP DISCUSS/DSCN MKR DOCD: CPT | Performed by: NURSE PRACTITIONER

## 2023-09-27 PROCEDURE — 3017F COLORECTAL CA SCREEN DOC REV: CPT | Performed by: NURSE PRACTITIONER

## 2023-09-27 PROCEDURE — G8427 DOCREV CUR MEDS BY ELIG CLIN: HCPCS | Performed by: NURSE PRACTITIONER

## 2023-09-27 RX ORDER — OXYCODONE HYDROCHLORIDE AND ACETAMINOPHEN 5; 325 MG/1; MG/1
1 TABLET ORAL EVERY 8 HOURS PRN
Qty: 90 TABLET | Refills: 0 | Status: SHIPPED | OUTPATIENT
Start: 2023-09-30 | End: 2023-10-30

## 2023-09-27 ASSESSMENT — ENCOUNTER SYMPTOMS
CONSTIPATION: 0
SHORTNESS OF BREATH: 0
BACK PAIN: 1
COUGH: 0
BOWEL INCONTINENCE: 0

## 2023-09-27 NOTE — PROGRESS NOTES
Diverticulosis of colon     Erectile dysfunction 2015    Hypertension     Osteoarthritis        Past Surgical History:   Procedure Laterality Date    COLONOSCOPY  11/26/2008    diverticulosis    COLONOSCOPY  12/29/2017    Diverticulosis    HERNIA REPAIR      JOINT REPLACEMENT  Left knee Jan 2016    PILONIDAL CYST EXCISION      [place] SCRN NOT [place] [address] IND N/A 12/29/2017    COLONOSCOPY performed by Janice Severino MD at [address] Right 06/2020    TOTAL KNEE ARTHROPLASTY Left 01/19/2016    [place]/Dr. Mac    TOTAL KNEE ARTHROPLASTY Right 12/2022    Dr Shellee Saint       No Known Allergies      Current Outpatient Medications:     atorvastatin (LIPITOR) 20 MG tablet, TAKE 1 TABLET BY MOUTH EVERY DAY, Disp: 90 tablet, Rfl: 1    oxyCODONE-acetaminophen (PERCOCET) 5-325 MG per tablet, Take 1 tablet by mouth every 8 hours as needed for Pain for up to 30 days. , Disp: 90 tablet, Rfl: 0    amLODIPine (NORVASC) 10 MG tablet, TAKE 1 TABLET BY MOUTH EVERY DAY, Disp: 90 tablet, Rfl: 1    enalapril (VASOTEC) 10 MG tablet, TAKE 1 TABLET BY MOUTH EVERY DAY, Disp: 90 tablet, Rfl: 1    metoprolol tartrate (LOPRESSOR) 25 MG tablet, TAKE 1 TABLET BY MOUTH TWO TIMES A DAY, Disp: 180 tablet, Rfl: 1    diclofenac (VOLTAREN) 75 MG EC tablet, Take 1 tablet by mouth 2 times daily as needed, Disp: , Rfl:     sildenafil (VIAGRA) 100 MG tablet, Take 1 tablet by mouth daily as needed for Erectile Dysfunction, Disp: 30 tablet, Rfl: 2    omeprazole (PRILOSEC) 20 MG delayed release capsule, TAKE 1 CAPSULE BY MOUTH 30 minutes before morning meal, Disp: , Rfl:     predniSONE (DELTASONE) 5 MG tablet, TAKE 1 TABLET BY MOUTH ONCE A DAY, Disp: , Rfl:     ELDERBERRY PO, Take 2 tablets by mouth daily, Disp: , Rfl:     Multiple Vitamins-Minerals (PRESERVISION/LUTEIN PO), Take 2 capsules by mouth daily, Disp: , Rfl:     folic acid (FOLVITE) 1 MG tablet, daily, Disp: , Rfl:     Multiple Vitamins-Minerals (MULTIVITAMIN PO),

## 2023-10-25 ENCOUNTER — OFFICE VISIT (OUTPATIENT)
Dept: PAIN MANAGEMENT | Age: 73
End: 2023-10-25
Payer: MEDICARE

## 2023-10-25 VITALS — BODY MASS INDEX: 33.43 KG/M2 | WEIGHT: 213 LBS | HEIGHT: 67 IN

## 2023-10-25 DIAGNOSIS — Z79.891 CHRONIC USE OF OPIATE FOR THERAPEUTIC PURPOSE: ICD-10-CM

## 2023-10-25 DIAGNOSIS — M19.90 INFLAMMATORY ARTHROPATHY: ICD-10-CM

## 2023-10-25 DIAGNOSIS — G89.29 OTHER CHRONIC PAIN: ICD-10-CM

## 2023-10-25 DIAGNOSIS — Z79.891 ENCOUNTER FOR LONG-TERM OPIATE ANALGESIC USE: ICD-10-CM

## 2023-10-25 DIAGNOSIS — M05.79 RHEUMATOID ARTHRITIS INVOLVING MULTIPLE SITES WITH POSITIVE RHEUMATOID FACTOR (HCC): Primary | ICD-10-CM

## 2023-10-25 PROCEDURE — G8484 FLU IMMUNIZE NO ADMIN: HCPCS | Performed by: NURSE PRACTITIONER

## 2023-10-25 PROCEDURE — 1123F ACP DISCUSS/DSCN MKR DOCD: CPT | Performed by: NURSE PRACTITIONER

## 2023-10-25 PROCEDURE — 3017F COLORECTAL CA SCREEN DOC REV: CPT | Performed by: NURSE PRACTITIONER

## 2023-10-25 PROCEDURE — G8427 DOCREV CUR MEDS BY ELIG CLIN: HCPCS | Performed by: NURSE PRACTITIONER

## 2023-10-25 PROCEDURE — 99213 OFFICE O/P EST LOW 20 MIN: CPT | Performed by: NURSE PRACTITIONER

## 2023-10-25 PROCEDURE — 1036F TOBACCO NON-USER: CPT | Performed by: NURSE PRACTITIONER

## 2023-10-25 PROCEDURE — G8417 CALC BMI ABV UP PARAM F/U: HCPCS | Performed by: NURSE PRACTITIONER

## 2023-10-25 RX ORDER — OXYCODONE HYDROCHLORIDE AND ACETAMINOPHEN 5; 325 MG/1; MG/1
1 TABLET ORAL EVERY 8 HOURS PRN
Qty: 90 TABLET | Refills: 0 | Status: SHIPPED | OUTPATIENT
Start: 2023-10-30 | End: 2023-11-29

## 2023-10-25 ASSESSMENT — ENCOUNTER SYMPTOMS
CONSTIPATION: 0
SHORTNESS OF BREATH: 0
BOWEL INCONTINENCE: 0
COUGH: 0
BACK PAIN: 1

## 2023-10-25 NOTE — PROGRESS NOTES
- Primary    Relevant Medications    oxyCODONE-acetaminophen (PERCOCET) 5-325 MG per tablet (Start on 10/30/2023)    Inflammatory arthropathy    Relevant Medications    oxyCODONE-acetaminophen (PERCOCET) 5-325 MG per tablet (Start on 10/30/2023)     Other Visit Diagnoses       Chronic use of opiate for therapeutic purpose                   Treatment Plan:  Patient relates current medications are helping the pain. Patient reports taking pain medications as prescribed, denies obtaining medications from different sources and denies use of illegal drugs. Medication risk and benefits have been discussed. Patient denies side effects from medications like nausea, vomiting, constipation or drowsiness. Patient reports current activities of daily living are possible due to medications and would like to continue them. As always, we encourage daily stretching and strengthening exercises, and recommend minimizing use of pain medications unless patient cannot get through daily activities due to pain. Due to the high risk nature of this patient's pain medication close monitoring is required. Continue current medication management, pt has been stable and compliant. Script written for percocet  Follow up appointment made for 4 weeks    I have reviewed the chief complaint and history of present illness (including ROS and PFSH) and vital documentation by my staff and I agree with their documentation and have added where applicable.

## 2023-11-10 DIAGNOSIS — I10 HYPERTENSION, UNSPECIFIED TYPE: ICD-10-CM

## 2023-11-10 RX ORDER — AMLODIPINE BESYLATE 10 MG/1
TABLET ORAL
Qty: 90 TABLET | Refills: 1 | Status: SHIPPED | OUTPATIENT
Start: 2023-11-10

## 2023-11-10 RX ORDER — ENALAPRIL MALEATE 10 MG/1
TABLET ORAL
Qty: 90 TABLET | Refills: 1 | Status: SHIPPED | OUTPATIENT
Start: 2023-11-10

## 2023-11-27 ENCOUNTER — HOSPITAL ENCOUNTER (OUTPATIENT)
Age: 73
Setting detail: SPECIMEN
Discharge: HOME | End: 2023-11-27

## 2023-11-27 ENCOUNTER — OFFICE VISIT (OUTPATIENT)
Dept: PAIN MANAGEMENT | Age: 73
End: 2023-11-27
Payer: MEDICARE

## 2023-11-27 VITALS — WEIGHT: 224.2 LBS | BODY MASS INDEX: 35.19 KG/M2 | HEIGHT: 67 IN

## 2023-11-27 DIAGNOSIS — M19.90 INFLAMMATORY ARTHROPATHY: ICD-10-CM

## 2023-11-27 DIAGNOSIS — Z79.891 ENCOUNTER FOR LONG-TERM OPIATE ANALGESIC USE: Primary | ICD-10-CM

## 2023-11-27 DIAGNOSIS — G89.29 OTHER CHRONIC PAIN: ICD-10-CM

## 2023-11-27 PROCEDURE — 99213 OFFICE O/P EST LOW 20 MIN: CPT | Performed by: PAIN MEDICINE

## 2023-11-27 PROCEDURE — G8484 FLU IMMUNIZE NO ADMIN: HCPCS | Performed by: PAIN MEDICINE

## 2023-11-27 PROCEDURE — G8417 CALC BMI ABV UP PARAM F/U: HCPCS | Performed by: PAIN MEDICINE

## 2023-11-27 PROCEDURE — 3017F COLORECTAL CA SCREEN DOC REV: CPT | Performed by: PAIN MEDICINE

## 2023-11-27 PROCEDURE — 1123F ACP DISCUSS/DSCN MKR DOCD: CPT | Performed by: PAIN MEDICINE

## 2023-11-27 PROCEDURE — 1036F TOBACCO NON-USER: CPT | Performed by: PAIN MEDICINE

## 2023-11-27 PROCEDURE — G8427 DOCREV CUR MEDS BY ELIG CLIN: HCPCS | Performed by: PAIN MEDICINE

## 2023-11-27 RX ORDER — OXYCODONE HYDROCHLORIDE AND ACETAMINOPHEN 5; 325 MG/1; MG/1
1 TABLET ORAL EVERY 8 HOURS PRN
Qty: 90 TABLET | Refills: 0 | Status: SHIPPED | OUTPATIENT
Start: 2023-11-27 | End: 2023-12-27

## 2023-11-27 RX ORDER — IRON,CARBONYL/ASCORBIC ACID 100-250 MG
TABLET ORAL
COMMUNITY

## 2023-11-27 ASSESSMENT — ENCOUNTER SYMPTOMS
COUGH: 0
BOWEL INCONTINENCE: 0

## 2023-11-27 NOTE — PROGRESS NOTES
HPI:     Chronic Pain  This is a chronic problem. The current episode started more than 1 year ago. The problem occurs constantly. The problem is unchanged. The pain is medium. Pertinent negatives include no chest pain or fever. Chronic diffuse joint pain. History of rheumatoid arthritis. Has had surgery on both shoulders. Has had both knees replaced. Has been on Percocet as needed. Is been stable and compliant. Patient denies any new neurological symptoms. Nobowel or bladder incontinence, no weakness, and no falling. Review of [place]ARRS does not show any aberrant prescription behavior. Medication is helping the patient stay active. Patient denies any side effects and reports adequate analgesia. No sign of misuse/abuse. Past Medical History:   Diagnosis Date    Calcium pyrophosphate deposition disease     Diverticulosis of colon     Erectile dysfunction 2015    Hypertension     Osteoarthritis        Past Surgical History:   Procedure Laterality Date    COLONOSCOPY  11/26/2008    diverticulosis    COLONOSCOPY  12/29/2017    Diverticulosis    HERNIA REPAIR      JOINT REPLACEMENT  Left knee Jan 2016    PILONIDAL CYST EXCISION      [place] COL[place] SCRN NOT [place] [address] IND N/A 12/29/2017    COLONOSCOPY performed by Anastasiia Machado MD at [address] [facility] Right 06/2020    TOTAL KNEE ARTHROPLASTY Left 01/19/2016    [place]/Dr. Mac    TOTAL KNEE ARTHROPLASTY Right 12/2022    Dr Garrett Mcrae       No Known Allergies      Current Outpatient Medications:     oxyCODONE-acetaminophen (PERCOCET) 5-325 MG per tablet, Take 1 tablet by mouth every 8 hours as needed for Pain for up to 30 days. , Disp: 90 tablet, Rfl: 0    Iron-Vitamin C (IRON 100/C) 100-250 MG TABS, Iron, Disp: , Rfl:     metoprolol tartrate (LOPRESSOR) 25 MG tablet, TAKE 1 TABLET BY MOUTH TWO TIMES A DAY, Disp: 180 tablet, Rfl: 1    amLODIPine (NORVASC) 10 MG tablet, TAKE 1 TABLET BY MOUTH EVERY DAY, Disp: 90 tablet, Rfl: 1

## 2023-11-28 DIAGNOSIS — Z79.891 ENCOUNTER FOR LONG-TERM OPIATE ANALGESIC USE: ICD-10-CM

## 2023-11-28 DIAGNOSIS — M19.90 INFLAMMATORY ARTHROPATHY: ICD-10-CM

## 2023-11-28 DIAGNOSIS — G89.29 OTHER CHRONIC PAIN: ICD-10-CM

## 2023-12-01 LAB
6-ACETYLMORPHINE, UR: NOT DETECTED
7-AMINOCLONAZEPAM, URINE: NOT DETECTED
ALPHA-OH-ALPRAZ, URINE: NOT DETECTED
ALPHA-OH-MIDAZOLAM, URINE: NOT DETECTED
ALPRAZOLAM, URINE: NOT DETECTED
AMPHETAMINES, URINE: NOT DETECTED
BARBITURATES, URINE: NEGATIVE
BENZOYLECGONINE, UR: NEGATIVE
BUPRENORPHINE URINE: NOT DETECTED
CARISOPRODOL, UR: NEGATIVE
CLONAZEPAM, URINE: NOT DETECTED
CODEINE, URINE: NOT DETECTED
CREATININE URINE: 110.1 MG/DL (ref 20–400)
DIAZEPAM, URINE: NOT DETECTED
DRUGS EXPECTED, UR: NORMAL
EER HI RES INTERP UR: NORMAL
ETHYL GLUCURONIDE UR: NEGATIVE
FENTANYL URINE: NOT DETECTED
GABAPENTIN: NOT DETECTED
HYDROCODONE, OPI6M: NOT DETECTED
HYDROMORPHONE, OPI3M: NOT DETECTED
LORAZEPAM, URINE: NOT DETECTED
MARIJUANA METAB, UR: NEGATIVE
MDA, UR: NOT DETECTED
MDEA, EVE, UR: NOT DETECTED
MDMA URINE: NOT DETECTED
MEPERIDINE METAB, UR: NOT DETECTED
METHADONE, URINE: NEGATIVE
METHAMPHETAMINE, URINE: NOT DETECTED
METHYLPHENIDATE: NOT DETECTED
MIDAZOLAM, URINE: NOT DETECTED
MORPHINE, OPI1M: NOT DETECTED
NALOXONE URINE: NOT DETECTED
NORBUPRENORPHINE, URINE: NOT DETECTED
NORDIAZEPAM, URINE: NOT DETECTED
NORFENTANYL, URINE: NOT DETECTED
NORHYDROCODONE, URINE: NOT DETECTED
NOROXYCODONE, URINE: PRESENT
NOROXYMORPHONE, URINE: PRESENT
OXAZEPAM, URINE: NOT DETECTED
OXYCODONE URINE: PRESENT
OXYMORPHONE, URINE: PRESENT
PAIN MANAGEMENT DRUG PANEL INTERP, URINE: NORMAL
PAIN MGT DRUG PANEL, HI RES, UR: NORMAL
PCP,URINE: NEGATIVE
PHENTERMINE, UR: NOT DETECTED
PREGABALIN: NOT DETECTED
TAPENTADOL, URINE: NOT DETECTED
TAPENTADOL-O-SULFATE, URINE: NOT DETECTED
TEMAZEPAM, URINE: NOT DETECTED
TRAMADOL, URINE: NEGATIVE
ZOLPIDEM METABOLITE (ZCA), URINE: NOT DETECTED
ZOLPIDEM, URINE: NOT DETECTED

## 2023-12-06 DIAGNOSIS — N52.9 ERECTILE DYSFUNCTION, UNSPECIFIED ERECTILE DYSFUNCTION TYPE: ICD-10-CM

## 2023-12-06 RX ORDER — SILDENAFIL 100 MG/1
100 TABLET, FILM COATED ORAL DAILY PRN
Qty: 30 TABLET | Refills: 2 | Status: CANCELLED | OUTPATIENT
Start: 2023-12-06

## 2023-12-06 NOTE — TELEPHONE ENCOUNTER
Khurram Alfaro is calling to request a refill on the following medication(s):    Last Visit Date (If Applicable):  7/07/7953    Next Visit Date:    3/26/2024    Medication Request:  Requested Prescriptions     Pending Prescriptions Disp Refills    sildenafil (VIAGRA) 100 MG tablet 30 tablet 2     Sig: Take 1 tablet by mouth daily as needed for Erectile Dysfunction

## 2024-01-18 ENCOUNTER — OFFICE VISIT (OUTPATIENT)
Dept: PAIN MANAGEMENT | Age: 74
End: 2024-01-18
Payer: MEDICARE

## 2024-01-18 VITALS — BODY MASS INDEX: 35.31 KG/M2 | HEIGHT: 67 IN | WEIGHT: 225 LBS

## 2024-01-18 DIAGNOSIS — G89.29 OTHER CHRONIC PAIN: ICD-10-CM

## 2024-01-18 DIAGNOSIS — Z79.891 ENCOUNTER FOR LONG-TERM OPIATE ANALGESIC USE: ICD-10-CM

## 2024-01-18 DIAGNOSIS — M19.90 INFLAMMATORY ARTHROPATHY: ICD-10-CM

## 2024-01-18 PROCEDURE — 99213 OFFICE O/P EST LOW 20 MIN: CPT | Performed by: NURSE PRACTITIONER

## 2024-01-18 PROCEDURE — 1123F ACP DISCUSS/DSCN MKR DOCD: CPT | Performed by: NURSE PRACTITIONER

## 2024-01-18 PROCEDURE — G8484 FLU IMMUNIZE NO ADMIN: HCPCS | Performed by: NURSE PRACTITIONER

## 2024-01-18 PROCEDURE — 3017F COLORECTAL CA SCREEN DOC REV: CPT | Performed by: NURSE PRACTITIONER

## 2024-01-18 PROCEDURE — 1036F TOBACCO NON-USER: CPT | Performed by: NURSE PRACTITIONER

## 2024-01-18 PROCEDURE — G8417 CALC BMI ABV UP PARAM F/U: HCPCS | Performed by: NURSE PRACTITIONER

## 2024-01-18 PROCEDURE — G8427 DOCREV CUR MEDS BY ELIG CLIN: HCPCS | Performed by: NURSE PRACTITIONER

## 2024-01-18 RX ORDER — OXYCODONE HYDROCHLORIDE AND ACETAMINOPHEN 5; 325 MG/1; MG/1
1 TABLET ORAL EVERY 8 HOURS PRN
Qty: 90 TABLET | Refills: 0 | Status: SHIPPED | OUTPATIENT
Start: 2024-01-26 | End: 2024-02-25

## 2024-01-18 ASSESSMENT — ENCOUNTER SYMPTOMS
CONSTIPATION: 0
COUGH: 0
BACK PAIN: 1
SHORTNESS OF BREATH: 0

## 2024-01-18 NOTE — PROGRESS NOTES
due 1/26    Morphine equivalent: 22.5    Controlled Substance Monitoring:    Acute and Chronic Pain Monitoring:   RX Monitoring Periodic Controlled Substance Monitoring   1/18/2024   9:41 AM Possible medication side effects, risk of tolerance/dependence & alternative treatments discussed.;No signs of potential drug abuse or diversion identified.;Assessed functional status (ability to engage in work or other purposeful activities, the pain intensity and its interference with activities of daily living, quality of family life and social activities, and the physical activity);Obtaining appropriate analgesic effect of treatment.            Past Medical History:   Diagnosis Date    Calcium pyrophosphate deposition disease     Diverticulosis of colon     Erectile dysfunction 2015    Hypertension     Osteoarthritis        Past Surgical History:   Procedure Laterality Date    COLONOSCOPY  11/26/2008    diverticulosis    COLONOSCOPY  12/29/2017    Diverticulosis    HERNIA REPAIR      JOINT REPLACEMENT  Left knee Jan 2016    PILONIDAL CYST EXCISION      [place] SCRN NOT HI [facility] IND N/A 12/29/2017    COLONOSCOPY performed by Jerzy Mcdowell MD at [facility] OR    ROTATOR CUFF REPAIR Right 06/2020    TOTAL KNEE ARTHROPLASTY Left 01/19/2016    [place]/Dr. Mac    TOTAL KNEE ARTHROPLASTY Right 12/2022    Dr Mac       No Known Allergies      Current Outpatient Medications:     oxyCODONE-acetaminophen (PERCOCET) 5-325 MG per tablet, Take 1 tablet by mouth every 8 hours as needed for Pain for up to 30 days., Disp: 90 tablet, Rfl: 0    sildenafil (VIAGRA) 100 MG tablet, Take 1 tablet by mouth daily as needed for Erectile Dysfunction, Disp: 30 tablet, Rfl: 2    Iron-Vitamin C (IRON 100/C) 100-250 MG TABS, Iron, Disp: , Rfl:     metoprolol tartrate (LOPRESSOR) 25 MG tablet, TAKE 1 TABLET BY MOUTH TWO TIMES A DAY, Disp: 180 tablet, Rfl: 1    amLODIPine (NORVASC) 10 MG tablet, TAKE 1 TABLET BY MOUTH EVERY DAY, Disp: 90

## 2024-01-22 ENCOUNTER — TELEPHONE (OUTPATIENT)
Dept: PAIN MANAGEMENT | Age: 74
End: 2024-01-22

## 2024-01-22 DIAGNOSIS — M19.90 INFLAMMATORY ARTHROPATHY: ICD-10-CM

## 2024-01-22 DIAGNOSIS — G89.29 OTHER CHRONIC PAIN: ICD-10-CM

## 2024-01-22 DIAGNOSIS — Z79.891 ENCOUNTER FOR LONG-TERM OPIATE ANALGESIC USE: ICD-10-CM

## 2024-01-22 RX ORDER — OXYCODONE HYDROCHLORIDE AND ACETAMINOPHEN 5; 325 MG/1; MG/1
1 TABLET ORAL EVERY 8 HOURS PRN
Qty: 90 TABLET | Refills: 0 | Status: SHIPPED | OUTPATIENT
Start: 2024-01-24 | End: 2024-02-23

## 2024-01-22 NOTE — TELEPHONE ENCOUNTER
Pt leaving for vacation on 1/24/2024 prescription due to fill on 1/26/2024 pharmacy needs a new prescription sent over. Please advise

## 2024-02-19 ENCOUNTER — OFFICE VISIT (OUTPATIENT)
Dept: PAIN MANAGEMENT | Age: 74
End: 2024-02-19
Payer: MEDICARE

## 2024-02-19 VITALS — BODY MASS INDEX: 34.53 KG/M2 | HEIGHT: 67 IN | WEIGHT: 220 LBS

## 2024-02-19 DIAGNOSIS — M19.90 INFLAMMATORY ARTHROPATHY: ICD-10-CM

## 2024-02-19 DIAGNOSIS — M25.512 CHRONIC LEFT SHOULDER PAIN: ICD-10-CM

## 2024-02-19 DIAGNOSIS — M05.79 RHEUMATOID ARTHRITIS INVOLVING MULTIPLE SITES WITH POSITIVE RHEUMATOID FACTOR (HCC): Primary | ICD-10-CM

## 2024-02-19 DIAGNOSIS — Z79.891 ENCOUNTER FOR LONG-TERM OPIATE ANALGESIC USE: ICD-10-CM

## 2024-02-19 DIAGNOSIS — G89.29 CHRONIC LEFT SHOULDER PAIN: ICD-10-CM

## 2024-02-19 DIAGNOSIS — G89.29 OTHER CHRONIC PAIN: ICD-10-CM

## 2024-02-19 PROCEDURE — G8484 FLU IMMUNIZE NO ADMIN: HCPCS | Performed by: NURSE PRACTITIONER

## 2024-02-19 PROCEDURE — 3017F COLORECTAL CA SCREEN DOC REV: CPT | Performed by: NURSE PRACTITIONER

## 2024-02-19 PROCEDURE — 1036F TOBACCO NON-USER: CPT | Performed by: NURSE PRACTITIONER

## 2024-02-19 PROCEDURE — 99213 OFFICE O/P EST LOW 20 MIN: CPT | Performed by: NURSE PRACTITIONER

## 2024-02-19 PROCEDURE — 1123F ACP DISCUSS/DSCN MKR DOCD: CPT | Performed by: NURSE PRACTITIONER

## 2024-02-19 PROCEDURE — G8417 CALC BMI ABV UP PARAM F/U: HCPCS | Performed by: NURSE PRACTITIONER

## 2024-02-19 PROCEDURE — G8427 DOCREV CUR MEDS BY ELIG CLIN: HCPCS | Performed by: NURSE PRACTITIONER

## 2024-02-19 RX ORDER — OXYCODONE HYDROCHLORIDE AND ACETAMINOPHEN 5; 325 MG/1; MG/1
1 TABLET ORAL EVERY 8 HOURS PRN
Qty: 90 TABLET | Refills: 0 | Status: SHIPPED | OUTPATIENT
Start: 2024-02-29 | End: 2024-03-30

## 2024-02-19 ASSESSMENT — ENCOUNTER SYMPTOMS
CONSTIPATION: 0
BACK PAIN: 1
COUGH: 0
BOWEL INCONTINENCE: 0
SHORTNESS OF BREATH: 0

## 2024-02-19 NOTE — PROGRESS NOTES
5-325 MG per tablet (Start on 2/29/2024)    Rheumatoid arthritis involving multiple sites with positive rheumatoid factor (HCC) - Primary    Relevant Medications    oxyCODONE-acetaminophen (PERCOCET) 5-325 MG per tablet (Start on 2/29/2024)    Inflammatory arthropathy    Relevant Medications    oxyCODONE-acetaminophen (PERCOCET) 5-325 MG per tablet (Start on 2/29/2024)     Other Visit Diagnoses       Chronic left shoulder pain        Relevant Medications    oxyCODONE-acetaminophen (PERCOCET) 5-325 MG per tablet (Start on 2/29/2024)               Treatment Plan:  Patient relates current medications are helping the pain. Patient reports taking pain medications as prescribed, denies obtaining medications from different sources and denies use of illegal drugs. Medication risk and benefits have been discussed. Patient denies side effects from medications like nausea, vomiting, constipation or drowsiness. Patient reports current activities of daily living are possible due to medications and would like to continue them.     As always, we encourage daily stretching and strengthening exercises, and recommend minimizing use of pain medications unless patient cannot get through daily activities due to pain.     Due to the high risk nature of this patient's pain medication close monitoring is required.   Continue current medication management, pt has been stable and compliant.  Script written for percocet   Follow up appointment made for 4 weeks    I have reviewed the chief complaint and history of present illness (including ROS and PFSH) and vital documentation by my staff and I agree with their documentation and have added where applicable.

## 2024-03-21 RX ORDER — ATORVASTATIN CALCIUM 20 MG/1
TABLET, FILM COATED ORAL
Qty: 90 TABLET | Refills: 0 | Status: SHIPPED | OUTPATIENT
Start: 2024-03-21

## 2024-03-25 ENCOUNTER — OFFICE VISIT (OUTPATIENT)
Dept: PAIN MANAGEMENT | Age: 74
End: 2024-03-25
Payer: MEDICARE

## 2024-03-25 VITALS — BODY MASS INDEX: 34.53 KG/M2 | HEIGHT: 67 IN | WEIGHT: 220 LBS

## 2024-03-25 DIAGNOSIS — Z79.891 ENCOUNTER FOR LONG-TERM OPIATE ANALGESIC USE: ICD-10-CM

## 2024-03-25 DIAGNOSIS — M19.90 INFLAMMATORY ARTHROPATHY: ICD-10-CM

## 2024-03-25 DIAGNOSIS — G89.29 OTHER CHRONIC PAIN: ICD-10-CM

## 2024-03-25 PROCEDURE — 1123F ACP DISCUSS/DSCN MKR DOCD: CPT | Performed by: NURSE PRACTITIONER

## 2024-03-25 PROCEDURE — 1036F TOBACCO NON-USER: CPT | Performed by: NURSE PRACTITIONER

## 2024-03-25 PROCEDURE — 99213 OFFICE O/P EST LOW 20 MIN: CPT | Performed by: NURSE PRACTITIONER

## 2024-03-25 PROCEDURE — G8417 CALC BMI ABV UP PARAM F/U: HCPCS | Performed by: NURSE PRACTITIONER

## 2024-03-25 PROCEDURE — G8427 DOCREV CUR MEDS BY ELIG CLIN: HCPCS | Performed by: NURSE PRACTITIONER

## 2024-03-25 PROCEDURE — G8484 FLU IMMUNIZE NO ADMIN: HCPCS | Performed by: NURSE PRACTITIONER

## 2024-03-25 PROCEDURE — 3017F COLORECTAL CA SCREEN DOC REV: CPT | Performed by: NURSE PRACTITIONER

## 2024-03-25 RX ORDER — OXYCODONE HYDROCHLORIDE AND ACETAMINOPHEN 5; 325 MG/1; MG/1
1 TABLET ORAL EVERY 8 HOURS PRN
Qty: 90 TABLET | Refills: 0 | Status: SHIPPED | OUTPATIENT
Start: 2024-03-30 | End: 2024-04-29

## 2024-03-25 ASSESSMENT — ENCOUNTER SYMPTOMS
SHORTNESS OF BREATH: 0
BACK PAIN: 1
COUGH: 0
BOWEL INCONTINENCE: 0
CONSTIPATION: 0

## 2024-03-25 NOTE — PROGRESS NOTES
medications are helping the pain. Patient reports taking pain medications as prescribed, denies obtaining medications from different sources and denies use of illegal drugs. Medication risk and benefits have been discussed. Patient denies side effects from medications like nausea, vomiting, constipation or drowsiness. Patient reports current activities of daily living are possible due to medications and would like to continue them.     As always, we encourage daily stretching and strengthening exercises, and recommend minimizing use of pain medications unless patient cannot get through daily activities due to pain.     Due to the high risk nature of this patient's pain medication close monitoring is required.   Continue current medication management, pt has been stable and compliant.  Script written for percocet  Discussed different treatment options including continued conservative care such as physical therapy, chiropractic care, acupuncture. He declines.  Discussed different interventional options such as epidural steroids or medial branch blocks.  Follow up appointment made for 4 weeks    I have reviewed the chief complaint and history of present illness (including ROS and PFSH) and vital documentation by my staff and I agree with their documentation and have added where applicable.

## 2024-03-26 ENCOUNTER — OFFICE VISIT (OUTPATIENT)
Dept: PRIMARY CARE CLINIC | Age: 74
End: 2024-03-26
Payer: MEDICARE

## 2024-03-26 VITALS
RESPIRATION RATE: 16 BRPM | SYSTOLIC BLOOD PRESSURE: 130 MMHG | BODY MASS INDEX: 34.14 KG/M2 | WEIGHT: 218 LBS | OXYGEN SATURATION: 98 % | DIASTOLIC BLOOD PRESSURE: 80 MMHG | HEART RATE: 68 BPM

## 2024-03-26 DIAGNOSIS — E11.9 TYPE 2 DIABETES MELLITUS WITHOUT COMPLICATION, WITHOUT LONG-TERM CURRENT USE OF INSULIN (HCC): Primary | ICD-10-CM

## 2024-03-26 DIAGNOSIS — F11.20 OPIOID DEPENDENCE WITH CURRENT USE (HCC): ICD-10-CM

## 2024-03-26 DIAGNOSIS — I10 HYPERTENSION, UNSPECIFIED TYPE: ICD-10-CM

## 2024-03-26 DIAGNOSIS — Z13.0 SCREENING, ANEMIA, DEFICIENCY, IRON: ICD-10-CM

## 2024-03-26 DIAGNOSIS — M05.79 RHEUMATOID ARTHRITIS INVOLVING MULTIPLE SITES WITH POSITIVE RHEUMATOID FACTOR (HCC): ICD-10-CM

## 2024-03-26 DIAGNOSIS — Z12.5 SCREENING FOR MALIGNANT NEOPLASM OF PROSTATE: ICD-10-CM

## 2024-03-26 LAB — HBA1C MFR BLD: 6.5 %

## 2024-03-26 PROCEDURE — 3044F HG A1C LEVEL LT 7.0%: CPT | Performed by: NURSE PRACTITIONER

## 2024-03-26 PROCEDURE — 3017F COLORECTAL CA SCREEN DOC REV: CPT | Performed by: NURSE PRACTITIONER

## 2024-03-26 PROCEDURE — 3075F SYST BP GE 130 - 139MM HG: CPT | Performed by: NURSE PRACTITIONER

## 2024-03-26 PROCEDURE — 2022F DILAT RTA XM EVC RTNOPTHY: CPT | Performed by: NURSE PRACTITIONER

## 2024-03-26 PROCEDURE — 3079F DIAST BP 80-89 MM HG: CPT | Performed by: NURSE PRACTITIONER

## 2024-03-26 PROCEDURE — G8427 DOCREV CUR MEDS BY ELIG CLIN: HCPCS | Performed by: NURSE PRACTITIONER

## 2024-03-26 PROCEDURE — 1036F TOBACCO NON-USER: CPT | Performed by: NURSE PRACTITIONER

## 2024-03-26 PROCEDURE — G8484 FLU IMMUNIZE NO ADMIN: HCPCS | Performed by: NURSE PRACTITIONER

## 2024-03-26 PROCEDURE — 99214 OFFICE O/P EST MOD 30 MIN: CPT | Performed by: NURSE PRACTITIONER

## 2024-03-26 PROCEDURE — 1123F ACP DISCUSS/DSCN MKR DOCD: CPT | Performed by: NURSE PRACTITIONER

## 2024-03-26 PROCEDURE — 83036 HEMOGLOBIN GLYCOSYLATED A1C: CPT | Performed by: NURSE PRACTITIONER

## 2024-03-26 PROCEDURE — G8417 CALC BMI ABV UP PARAM F/U: HCPCS | Performed by: NURSE PRACTITIONER

## 2024-03-26 SDOH — ECONOMIC STABILITY: INCOME INSECURITY: HOW HARD IS IT FOR YOU TO PAY FOR THE VERY BASICS LIKE FOOD, HOUSING, MEDICAL CARE, AND HEATING?: NOT HARD AT ALL

## 2024-03-26 SDOH — ECONOMIC STABILITY: FOOD INSECURITY: WITHIN THE PAST 12 MONTHS, YOU WORRIED THAT YOUR FOOD WOULD RUN OUT BEFORE YOU GOT MONEY TO BUY MORE.: NEVER TRUE

## 2024-03-26 SDOH — ECONOMIC STABILITY: FOOD INSECURITY: WITHIN THE PAST 12 MONTHS, THE FOOD YOU BOUGHT JUST DIDN'T LAST AND YOU DIDN'T HAVE MONEY TO GET MORE.: NEVER TRUE

## 2024-03-26 ASSESSMENT — ENCOUNTER SYMPTOMS
COLOR CHANGE: 0
VOMITING: 0
RHINORRHEA: 0
NAUSEA: 0
DIARRHEA: 0
CHEST TIGHTNESS: 0
SORE THROAT: 0
ABDOMINAL PAIN: 0

## 2024-03-26 ASSESSMENT — PATIENT HEALTH QUESTIONNAIRE - PHQ9
1. LITTLE INTEREST OR PLEASURE IN DOING THINGS: NOT AT ALL
SUM OF ALL RESPONSES TO PHQ QUESTIONS 1-9: 0
2. FEELING DOWN, DEPRESSED OR HOPELESS: NOT AT ALL
SUM OF ALL RESPONSES TO PHQ QUESTIONS 1-9: 0
SUM OF ALL RESPONSES TO PHQ9 QUESTIONS 1 & 2: 0

## 2024-03-26 NOTE — PROGRESS NOTES
[facility]  [facility]  [address] [facility] [address]  Dept: [phone]  Dept Fax: [phone]    Morris Becker Sr. is a 73 y.o. male who presentstoday for his medical conditions/complaints as noted below.  Morris Becker Sr. is c/o of  Chief Complaint   Patient presents with    Hypertension     6 month follow up         HPI:     Here for routine management of chronic conditions   HTN stable on enalapril, norvasc and lopressor  Denies CP, SOB, leg swelling or HA  HLD- on statin, tolerates well. Stays active   RA- follows with ortho, rheum and pain management  Remains on methotrexate and percocet. Denies adverse side effects     No current concerns or complaints         Hemoglobin A1C (%)   Date Value   03/26/2024 6.5   11/18/2022 5.9   03/28/2022 6.1 (H)             ( goal A1C is < 7)   No components found for: \"LABMICR\"  LDL Cholesterol (mg/dL)   Date Value   03/27/2023 67   03/28/2022 55     LDL Calculated (mg/dL)   Date Value   09/16/2021 70   08/24/2020 54   10/16/2019 115       (goal LDL is <100)   AST (U/L)   Date Value   03/05/2024 28     ALT (U/L)   Date Value   03/05/2024 17     BUN (MG/DL)   Date Value   03/05/2024 13     BP Readings from Last 3 Encounters:   03/26/24 130/80   09/26/23 138/76   03/20/23 136/80          ([id number])    Past Medical History:   Diagnosis Date    Calcium pyrophosphate deposition disease     Diverticulosis of colon     Erectile dysfunction 2015    Hypertension     Osteoarthritis       Past Surgical History:   Procedure Laterality Date    COLONOSCOPY  11/26/2008    diverticulosis    COLONOSCOPY  12/29/2017    Diverticulosis    HERNIA REPAIR      JOINT REPLACEMENT  Left knee Jan 2016    PILONIDAL CYST EXCISION      TX COLON CA SCRN NOT HI RSK IND N/A 12/29/2017    COLONOSCOPY performed by Jerzy Mcdowell MD at [facility] OR    ROTATOR CUFF REPAIR Right 06/2020    TOTAL KNEE ARTHROPLASTY Left 01/19/2016    [place]/Dr. Mac

## 2024-04-01 ENCOUNTER — HOSPITAL ENCOUNTER (OUTPATIENT)
Age: 74
Setting detail: SPECIMEN
Discharge: HOME | End: 2024-04-01

## 2024-04-01 DIAGNOSIS — E11.9 TYPE 2 DIABETES MELLITUS WITHOUT COMPLICATION, WITHOUT LONG-TERM CURRENT USE OF INSULIN (HCC): ICD-10-CM

## 2024-04-01 DIAGNOSIS — Z13.0 SCREENING, ANEMIA, DEFICIENCY, IRON: ICD-10-CM

## 2024-04-01 DIAGNOSIS — I10 HYPERTENSION, UNSPECIFIED TYPE: ICD-10-CM

## 2024-04-01 DIAGNOSIS — Z12.5 SCREENING FOR MALIGNANT NEOPLASM OF PROSTATE: ICD-10-CM

## 2024-04-01 LAB
ALBUMIN SERPL-MCNC: 4.3 G/DL (ref 3.5–5.2)
ALBUMIN/GLOB SERPL: 2 {RATIO} (ref 1–2.5)
ALP SERPL-CCNC: 91 U/L (ref 40–129)
ALT SERPL-CCNC: 15 U/L (ref 10–50)
ANION GAP SERPL CALCULATED.3IONS-SCNC: 13 MMOL/L (ref 9–16)
AST SERPL-CCNC: 26 U/L (ref 10–50)
BASOPHILS # BLD: 0.09 K/UL (ref 0–0.2)
BASOPHILS NFR BLD: 1 % (ref 0–2)
BILIRUB SERPL-MCNC: 0.3 MG/DL (ref 0–1.2)
BUN SERPL-MCNC: 16 MG/DL (ref 8–23)
CALCIUM SERPL-MCNC: 9.3 MG/DL (ref 8.6–10.4)
CHLORIDE SERPL-SCNC: 107 MMOL/L (ref 98–107)
CHOLEST SERPL-MCNC: 125 MG/DL (ref 0–199)
CHOLESTEROL/HDL RATIO: 3
CO2 SERPL-SCNC: 23 MMOL/L (ref 20–31)
CREAT SERPL-MCNC: 1 MG/DL (ref 0.7–1.2)
EOSINOPHIL # BLD: 0.37 K/UL (ref 0–0.44)
EOSINOPHILS RELATIVE PERCENT: 5 % (ref 1–4)
ERYTHROCYTE [DISTWIDTH] IN BLOOD BY AUTOMATED COUNT: 14.1 % (ref 11.8–14.4)
GFR SERPL CREATININE-BSD FRML MDRD: 80 ML/MIN/1.73M2
GLUCOSE SERPL-MCNC: 132 MG/DL (ref 74–99)
HCT VFR BLD AUTO: 42.5 % (ref 40.7–50.3)
HDLC SERPL-MCNC: 43 MG/DL
HGB BLD-MCNC: 13.6 G/DL (ref 13–17)
IMM GRANULOCYTES # BLD AUTO: <0.03 K/UL (ref 0–0.3)
IMM GRANULOCYTES NFR BLD: 0 %
LDLC SERPL CALC-MCNC: 58 MG/DL (ref 0–100)
LYMPHOCYTES NFR BLD: 2.2 K/UL (ref 1.1–3.7)
LYMPHOCYTES RELATIVE PERCENT: 29 % (ref 24–43)
MCH RBC QN AUTO: 28.6 PG (ref 25.2–33.5)
MCHC RBC AUTO-ENTMCNC: 32 G/DL (ref 28.4–34.8)
MCV RBC AUTO: 89.5 FL (ref 82.6–102.9)
MONOCYTES NFR BLD: 0.65 K/UL (ref 0.1–1.2)
MONOCYTES NFR BLD: 9 % (ref 3–12)
NEUTROPHILS NFR BLD: 56 % (ref 36–65)
NEUTS SEG NFR BLD: 4.25 K/UL (ref 1.5–8.1)
NRBC BLD-RTO: 0 PER 100 WBC
PLATELET # BLD AUTO: 253 K/UL (ref 138–453)
PMV BLD AUTO: 10.8 FL (ref 8.1–13.5)
POTASSIUM SERPL-SCNC: 4.1 MMOL/L (ref 3.7–5.3)
PROT SERPL-MCNC: 6.9 G/DL (ref 6.6–8.7)
PSA SERPL-MCNC: 1.5 NG/ML (ref 0–4)
RBC # BLD AUTO: 4.75 M/UL (ref 4.21–5.77)
SODIUM SERPL-SCNC: 143 MMOL/L (ref 136–145)
TRIGL SERPL-MCNC: 121 MG/DL
VLDLC SERPL CALC-MCNC: 24 MG/DL
WBC OTHER # BLD: 7.6 K/UL (ref 3.5–11.3)

## 2024-04-29 ENCOUNTER — HOSPITAL ENCOUNTER (OUTPATIENT)
Age: 74
Setting detail: SPECIMEN
Discharge: HOME | End: 2024-04-29

## 2024-04-29 ENCOUNTER — OFFICE VISIT (OUTPATIENT)
Dept: PAIN MANAGEMENT | Age: 74
End: 2024-04-29
Payer: MEDICARE

## 2024-04-29 VITALS — BODY MASS INDEX: 32.8 KG/M2 | HEIGHT: 67 IN | WEIGHT: 209 LBS

## 2024-04-29 DIAGNOSIS — M05.79 RHEUMATOID ARTHRITIS INVOLVING MULTIPLE SITES WITH POSITIVE RHEUMATOID FACTOR (HCC): Primary | ICD-10-CM

## 2024-04-29 DIAGNOSIS — G89.29 OTHER CHRONIC PAIN: ICD-10-CM

## 2024-04-29 DIAGNOSIS — Z79.891 ENCOUNTER FOR LONG-TERM OPIATE ANALGESIC USE: ICD-10-CM

## 2024-04-29 DIAGNOSIS — M19.90 INFLAMMATORY ARTHROPATHY: ICD-10-CM

## 2024-04-29 PROCEDURE — 1036F TOBACCO NON-USER: CPT | Performed by: NURSE PRACTITIONER

## 2024-04-29 PROCEDURE — 1123F ACP DISCUSS/DSCN MKR DOCD: CPT | Performed by: NURSE PRACTITIONER

## 2024-04-29 PROCEDURE — 3017F COLORECTAL CA SCREEN DOC REV: CPT | Performed by: NURSE PRACTITIONER

## 2024-04-29 PROCEDURE — G8427 DOCREV CUR MEDS BY ELIG CLIN: HCPCS | Performed by: NURSE PRACTITIONER

## 2024-04-29 PROCEDURE — 99214 OFFICE O/P EST MOD 30 MIN: CPT | Performed by: NURSE PRACTITIONER

## 2024-04-29 PROCEDURE — G8417 CALC BMI ABV UP PARAM F/U: HCPCS | Performed by: NURSE PRACTITIONER

## 2024-04-29 RX ORDER — OXYCODONE HYDROCHLORIDE AND ACETAMINOPHEN 5; 325 MG/1; MG/1
1 TABLET ORAL EVERY 8 HOURS PRN
Qty: 90 TABLET | Refills: 0 | Status: SHIPPED | OUTPATIENT
Start: 2024-04-29 | End: 2024-05-29

## 2024-04-29 ASSESSMENT — ENCOUNTER SYMPTOMS
COUGH: 0
CONSTIPATION: 0
BOWEL INCONTINENCE: 0
SHORTNESS OF BREATH: 0

## 2024-04-29 NOTE — PROGRESS NOTES
Chief Complaint   Patient presents with    Back Pain     Med refill        [facility]  Patient complains of diffuse joint pain. Hx of rheumatoid arthritis and follows with rheumatologist. Currently prescribed diclofenac and states his methotrexate was d/c. He was taking prednisone daily but was advised by rheumatologist to taper off of this. He has not taken this since December 2023. He also takes percocet 5/325 TID for pain from our office. He has been stable and compliant on this dose for many years. He has had surgery on both shoulders and both knees.  Most recent was right TKR done 12/2022 with Dr. Mac. He still has some intermittent pain but this is managed with current medication. Has completed PT with some relief.             Back Pain  This is a chronic problem. The current episode started more than 1 year ago. The problem occurs daily. The problem has been waxing and waning since onset. The pain is present in the lumbar spine. The quality of the pain is described as aching. The pain does not radiate. The pain is at a severity of 3/10. The pain is mild. The symptoms are aggravated by sitting. Pertinent negatives include no bladder incontinence, bowel incontinence, chest pain, fever, numbness, tingling or weakness. He has tried heat, ice, home exercises, chiropractic manipulation and NSAIDs for the symptoms.        Patient denies any new neurological symptoms. No bowel or bladder incontinence, no weakness, and no falling.    Pill count: 8 percocet, due 4/29, appropriate    Morphine equivalent: 22.5    Controlled Substance Monitoring:    Acute and Chronic Pain Monitoring:   RX Monitoring Periodic Controlled Substance Monitoring   4/29/2024   8:38 AM Possible medication side effects, risk of tolerance/dependence & alternative treatments discussed.;No signs of potential drug abuse or diversion identified.;Assessed functional status (ability to engage in work or other purposeful activities, the pain  swelling/abscess to left neck, right facial swelling

## 2024-04-30 DIAGNOSIS — Z79.891 ENCOUNTER FOR LONG-TERM OPIATE ANALGESIC USE: ICD-10-CM

## 2024-05-03 LAB
6-ACETYLMORPHINE, UR: NOT DETECTED
7-AMINOCLONAZEPAM, URINE: NOT DETECTED
ALPHA-OH-ALPRAZ, URINE: NOT DETECTED
ALPHA-OH-MIDAZOLAM, URINE: NOT DETECTED
ALPRAZOLAM, URINE: NOT DETECTED
AMPHETAMINES, URINE: NOT DETECTED
BARBITURATES, URINE: NEGATIVE
BENZOYLECGONINE, UR: NEGATIVE
BUPRENORPHINE URINE: NOT DETECTED
CARISOPRODOL, UR: NEGATIVE
CLONAZEPAM, URINE: NOT DETECTED
CODEINE, URINE: NOT DETECTED
CREAT UR-MCNC: 240.2 MG/DL (ref 20–400)
DIAZEPAM, URINE: NOT DETECTED
DRUGS EXPECTED, UR: NORMAL
EER HI RES INTERP UR: NORMAL
ETHYL GLUCURONIDE UR: NORMAL
FENTANYL URINE: NOT DETECTED
GABAPENTIN: NOT DETECTED
HYDROCODONE, URINE: NOT DETECTED
HYDROMORPHONE, URINE: NOT DETECTED
LORAZEPAM, URINE: NOT DETECTED
MARIJUANA METAB, UR: NEGATIVE
MDA, UR: NOT DETECTED
MDEA, EVE, UR: NOT DETECTED
MDMA URINE: NOT DETECTED
MEPERIDINE METAB, UR: NOT DETECTED
METHADONE, URINE: NEGATIVE
METHAMPHETAMINE, URINE: NOT DETECTED
METHYLPHENIDATE: NOT DETECTED
MIDAZOLAM, URINE: NOT DETECTED
MORPHINE, OPI1M: NOT DETECTED
NALOXONE URINE: NOT DETECTED
NORBUPRENORPHINE, URINE: NOT DETECTED
NORDIAZEPAM, URINE: NOT DETECTED
NORFENTANYL, URINE: NOT DETECTED
NORHYDROCODONE, URINE: NOT DETECTED
NOROXYCODONE, URINE: PRESENT
NOROXYMORPHONE, URINE: PRESENT
OXAZEPAM, URINE: NOT DETECTED
OXYCODONE URINE: PRESENT
OXYMORPHONE, URINE: PRESENT
PAIN MANAGEMENT DRUG PANEL INTERP, URINE: NORMAL
PAIN MGT DRUG PANEL, HI RES, UR: NORMAL
PCP,URINE: NEGATIVE
PHENTERMINE, UR: NOT DETECTED
PREGABALIN: NOT DETECTED
TAPENTADOL, URINE: NOT DETECTED
TAPENTADOL-O-SULFATE, URINE: NOT DETECTED
TEMAZEPAM, URINE: NOT DETECTED
TRAMADOL, URINE: NEGATIVE
ZOLPIDEM METABOLITE (ZCA), URINE: NOT DETECTED
ZOLPIDEM, URINE: NOT DETECTED

## 2024-05-08 DIAGNOSIS — I10 HYPERTENSION, UNSPECIFIED TYPE: ICD-10-CM

## 2024-05-09 RX ORDER — ENALAPRIL MALEATE 10 MG/1
TABLET ORAL
Qty: 90 TABLET | Refills: 1 | Status: SHIPPED | OUTPATIENT
Start: 2024-05-09

## 2024-05-09 RX ORDER — AMLODIPINE BESYLATE 10 MG/1
TABLET ORAL
Qty: 90 TABLET | Refills: 1 | Status: SHIPPED | OUTPATIENT
Start: 2024-05-09

## 2024-05-23 ENCOUNTER — OFFICE VISIT (OUTPATIENT)
Dept: PAIN MANAGEMENT | Age: 74
End: 2024-05-23
Payer: MEDICARE

## 2024-05-23 VITALS — WEIGHT: 209 LBS | BODY MASS INDEX: 32.8 KG/M2 | HEIGHT: 67 IN

## 2024-05-23 DIAGNOSIS — Z79.891 ENCOUNTER FOR LONG-TERM OPIATE ANALGESIC USE: ICD-10-CM

## 2024-05-23 DIAGNOSIS — M05.79 RHEUMATOID ARTHRITIS INVOLVING MULTIPLE SITES WITH POSITIVE RHEUMATOID FACTOR (HCC): Primary | ICD-10-CM

## 2024-05-23 DIAGNOSIS — G89.29 CHRONIC LEFT SHOULDER PAIN: ICD-10-CM

## 2024-05-23 DIAGNOSIS — M25.512 CHRONIC LEFT SHOULDER PAIN: ICD-10-CM

## 2024-05-23 DIAGNOSIS — G89.29 OTHER CHRONIC PAIN: ICD-10-CM

## 2024-05-23 DIAGNOSIS — M19.90 INFLAMMATORY ARTHROPATHY: ICD-10-CM

## 2024-05-23 PROCEDURE — 99213 OFFICE O/P EST LOW 20 MIN: CPT | Performed by: NURSE PRACTITIONER

## 2024-05-23 PROCEDURE — G8427 DOCREV CUR MEDS BY ELIG CLIN: HCPCS | Performed by: NURSE PRACTITIONER

## 2024-05-23 PROCEDURE — G8417 CALC BMI ABV UP PARAM F/U: HCPCS | Performed by: NURSE PRACTITIONER

## 2024-05-23 PROCEDURE — 1036F TOBACCO NON-USER: CPT | Performed by: NURSE PRACTITIONER

## 2024-05-23 PROCEDURE — 1123F ACP DISCUSS/DSCN MKR DOCD: CPT | Performed by: NURSE PRACTITIONER

## 2024-05-23 RX ORDER — OXYCODONE HYDROCHLORIDE AND ACETAMINOPHEN 5; 325 MG/1; MG/1
1 TABLET ORAL EVERY 8 HOURS PRN
Qty: 90 TABLET | Refills: 0 | Status: SHIPPED | OUTPATIENT
Start: 2024-05-29 | End: 2024-06-28

## 2024-05-23 ASSESSMENT — ENCOUNTER SYMPTOMS
COUGH: 0
CONSTIPATION: 0
BACK PAIN: 1
SHORTNESS OF BREATH: 0
BOWEL INCONTINENCE: 0

## 2024-05-23 NOTE — PROGRESS NOTES
Chief Complaint:  Chief Complaint   Patient presents with    Back Pain    Medication Refill         [facility]   Patient complains of diffuse joint pain. Hx of rheumatoid arthritis and follows with rheumatologist. Currently prescribed diclofenac and states his methotrexate was d/c. He was taking prednisone daily but was advised by rheumatologist to taper off of this. He has not taken this since December 2023. He also takes percocet 5/325 TID for pain from our office. He has been stable and compliant on this dose for many years. He has had surgery on both shoulders and both knees.  Most recent was right TKR done 12/2022 with Dr. Mac. He still has some intermittent pain but this is managed with current medication. Has completed PT with some relief.           Back Pain  This is a chronic problem. The current episode started more than 1 year ago. The problem occurs daily. The problem is unchanged. The pain is present in the lumbar spine. The quality of the pain is described as aching. The pain does not radiate. The pain is at a severity of 6/10. The pain is moderate. The pain is The same all the time. The symptoms are aggravated by sitting. Pertinent negatives include no bladder incontinence, bowel incontinence, chest pain, fever, numbness or tingling. He has tried ice, heat, chiropractic manipulation, home exercises and NSAIDs for the symptoms.       Patient denies any new neurological symptoms. No bowel or bladder incontinence, no weakness, and no falling.    Pill count: appropriate 21 - due 5/29    Morphine equivalent: 22.5    Controlled Substance Monitoring:    Acute and Chronic Pain Monitoring:   RX Monitoring Periodic Controlled Substance Monitoring   5/23/2024   3:09 PM Possible medication side effects, risk of tolerance/dependence & alternative treatments discussed.;No signs of potential drug abuse or diversion identified.;Assessed functional status (ability to engage in work or other purposeful

## 2024-06-17 RX ORDER — ATORVASTATIN CALCIUM 20 MG/1
TABLET, FILM COATED ORAL
Qty: 90 TABLET | Refills: 1 | Status: SHIPPED | OUTPATIENT
Start: 2024-06-17

## 2024-06-27 ENCOUNTER — OFFICE VISIT (OUTPATIENT)
Dept: PAIN MANAGEMENT | Age: 74
End: 2024-06-27
Payer: MEDICARE

## 2024-06-27 VITALS — OXYGEN SATURATION: 98 % | WEIGHT: 209 LBS | HEART RATE: 68 BPM | HEIGHT: 67 IN | BODY MASS INDEX: 32.8 KG/M2

## 2024-06-27 DIAGNOSIS — Z79.891 ENCOUNTER FOR LONG-TERM OPIATE ANALGESIC USE: ICD-10-CM

## 2024-06-27 DIAGNOSIS — G89.29 OTHER CHRONIC PAIN: ICD-10-CM

## 2024-06-27 DIAGNOSIS — M19.90 INFLAMMATORY ARTHROPATHY: ICD-10-CM

## 2024-06-27 PROCEDURE — 1123F ACP DISCUSS/DSCN MKR DOCD: CPT | Performed by: NURSE PRACTITIONER

## 2024-06-27 PROCEDURE — G8417 CALC BMI ABV UP PARAM F/U: HCPCS | Performed by: NURSE PRACTITIONER

## 2024-06-27 PROCEDURE — 99213 OFFICE O/P EST LOW 20 MIN: CPT | Performed by: NURSE PRACTITIONER

## 2024-06-27 PROCEDURE — G8427 DOCREV CUR MEDS BY ELIG CLIN: HCPCS | Performed by: NURSE PRACTITIONER

## 2024-06-27 PROCEDURE — 1036F TOBACCO NON-USER: CPT | Performed by: NURSE PRACTITIONER

## 2024-06-27 RX ORDER — OXYCODONE HYDROCHLORIDE AND ACETAMINOPHEN 5; 325 MG/1; MG/1
1 TABLET ORAL EVERY 8 HOURS PRN
Qty: 90 TABLET | Refills: 0 | Status: SHIPPED | OUTPATIENT
Start: 2024-06-29 | End: 2024-07-29

## 2024-06-27 ASSESSMENT — ENCOUNTER SYMPTOMS
CONSTIPATION: 0
BACK PAIN: 1
BOWEL INCONTINENCE: 0
COUGH: 0
SHORTNESS OF BREATH: 0

## 2024-06-27 NOTE — PROGRESS NOTES
Chief Complaint   Patient presents with    Back Pain    Medication Refill        [facility]  Patient complains of diffuse joint pain. Hx of rheumatoid arthritis and follows with rheumatologist. Currently prescribed diclofenac and states his methotrexate was d/c. He was taking prednisone daily but was advised by rheumatologist to taper off of this. He has not taken this since December 2023. He also takes percocet 5/325 TID for pain from our office. He has been stable and compliant on this dose for many years. He has had surgery on both shoulders and both knees.  Most recent was right TKR done 12/2022 with Dr. Mac. He still has some intermittent pain but this is managed with current medication. Has completed PT with some relief.            Back Pain  This is a chronic problem. The current episode started more than 1 year ago. The problem occurs constantly. The problem is unchanged. The pain is present in the sacro-iliac. The quality of the pain is described as aching. The pain does not radiate. The pain is at a severity of 4/10. The pain is moderate. The pain is The same all the time. The symptoms are aggravated by sitting. Stiffness is present In the morning. Pertinent negatives include no bladder incontinence, bowel incontinence, chest pain or fever. He has tried ice, heat, walking, home exercises, bed rest, muscle relaxant, chiropractic manipulation and NSAIDs for the symptoms. The treatment provided no relief.     Patient denies any new neurological symptoms. No bowel or bladder incontinence, no weakness, and no falling.    Pill count:10 appropriate - due 6/28    Morphine equivalent: 22.5    Controlled Substance Monitoring:    Acute and Chronic Pain Monitoring:   RX Monitoring Periodic Controlled Substance Monitoring   6/27/2024   4:24 PM Possible medication side effects, risk of tolerance/dependence & alternative treatments discussed.;No signs of potential drug abuse or diversion identified.;Assessed

## 2024-07-03 ENCOUNTER — OFFICE VISIT (OUTPATIENT)
Dept: PRIMARY CARE CLINIC | Age: 74
End: 2024-07-03
Payer: MEDICARE

## 2024-07-03 VITALS
BODY MASS INDEX: 32.61 KG/M2 | DIASTOLIC BLOOD PRESSURE: 72 MMHG | OXYGEN SATURATION: 98 % | SYSTOLIC BLOOD PRESSURE: 130 MMHG | RESPIRATION RATE: 15 BRPM | HEART RATE: 60 BPM | WEIGHT: 208.2 LBS

## 2024-07-03 DIAGNOSIS — M05.79 RHEUMATOID ARTHRITIS INVOLVING MULTIPLE SITES WITH POSITIVE RHEUMATOID FACTOR (HCC): ICD-10-CM

## 2024-07-03 DIAGNOSIS — I10 HYPERTENSION, UNSPECIFIED TYPE: ICD-10-CM

## 2024-07-03 DIAGNOSIS — E11.9 TYPE 2 DIABETES MELLITUS WITHOUT COMPLICATION, WITHOUT LONG-TERM CURRENT USE OF INSULIN (HCC): Primary | ICD-10-CM

## 2024-07-03 LAB — HBA1C MFR BLD: 6 %

## 2024-07-03 PROCEDURE — 3075F SYST BP GE 130 - 139MM HG: CPT | Performed by: NURSE PRACTITIONER

## 2024-07-03 PROCEDURE — 1036F TOBACCO NON-USER: CPT | Performed by: NURSE PRACTITIONER

## 2024-07-03 PROCEDURE — 99214 OFFICE O/P EST MOD 30 MIN: CPT | Performed by: NURSE PRACTITIONER

## 2024-07-03 PROCEDURE — G8417 CALC BMI ABV UP PARAM F/U: HCPCS | Performed by: NURSE PRACTITIONER

## 2024-07-03 PROCEDURE — 1123F ACP DISCUSS/DSCN MKR DOCD: CPT | Performed by: NURSE PRACTITIONER

## 2024-07-03 PROCEDURE — G8427 DOCREV CUR MEDS BY ELIG CLIN: HCPCS | Performed by: NURSE PRACTITIONER

## 2024-07-03 PROCEDURE — 3044F HG A1C LEVEL LT 7.0%: CPT | Performed by: NURSE PRACTITIONER

## 2024-07-03 PROCEDURE — 3078F DIAST BP <80 MM HG: CPT | Performed by: NURSE PRACTITIONER

## 2024-07-03 PROCEDURE — 83036 HEMOGLOBIN GLYCOSYLATED A1C: CPT | Performed by: NURSE PRACTITIONER

## 2024-07-03 ASSESSMENT — ENCOUNTER SYMPTOMS
COLOR CHANGE: 0
NAUSEA: 0
SHORTNESS OF BREATH: 0
DIARRHEA: 0
RHINORRHEA: 0
ABDOMINAL PAIN: 0
SORE THROAT: 0
VOMITING: 0
CHEST TIGHTNESS: 0

## 2024-07-03 ASSESSMENT — PATIENT HEALTH QUESTIONNAIRE - PHQ9
1. LITTLE INTEREST OR PLEASURE IN DOING THINGS: NOT AT ALL
SUM OF ALL RESPONSES TO PHQ9 QUESTIONS 1 & 2: 0
2. FEELING DOWN, DEPRESSED OR HOPELESS: NOT AT ALL
SUM OF ALL RESPONSES TO PHQ QUESTIONS 1-9: 0

## 2024-07-03 NOTE — PROGRESS NOTES
[facility]  [facility]  [address] [place] [address] [address]  [address]  [facility] [address]  Dept: [phone]  Dept Fax: [phone]    Morris Becker Sr. is a 74 y.o. male who presentstoday for his medical conditions/complaints as noted below.  Morris Becker Sr. is c/o of  Chief Complaint   Patient presents with    Diabetes         HPI:     Here for routine management of chronic conditions   HTN stable on enalapril, norvasc and lopressor  Denies CP, SOB, leg swelling or HA  HLD- on statin, tolerates well. Stays active, golfing twice a week generally   RA- follows with ortho, rheum and pain management  Remains on methotrexate and percocet. Denies adverse side effects   DM2- controlled wit diet and lifestyle  Hba1c down to 6.0 from 6.5   Labs and HM UTD  No current concerns or complaints           Hemoglobin A1C (%)   Date Value   07/03/2024 6.0   03/26/2024 6.5   11/18/2022 5.9             ( goal A1C is < 7)   No components found for: \"LABMICR\"  No components found for: \"LDLCHOLESTEROL\", \"LDLCALC\"    (goal LDL is <100)   AST (U/L)   Date Value   04/01/2024 26     ALT (U/L)   Date Value   04/01/2024 15     BUN (mg/dL)   Date Value   04/01/2024 16     BP Readings from Last 3 Encounters:   07/03/24 130/72   03/26/24 130/80   09/26/23 138/76          ([id number])    Past Medical History:   Diagnosis Date    Calcium pyrophosphate deposition disease     Diverticulosis of colon     Erectile dysfunction 2015    Hypertension     Osteoarthritis       Past Surgical History:   Procedure Laterality Date    COLONOSCOPY  11/26/2008    diverticulosis    COLONOSCOPY  12/29/2017    Diverticulosis    HERNIA REPAIR      JOINT REPLACEMENT  Left knee Jan 2016    PILONIDAL CYST EXCISION      HI COLON CA SCRN NOT HI RSK IND N/A 12/29/2017    COLONOSCOPY performed by Jerzy Mcdowell MD at [facility] OR    ROTATOR CUFF REPAIR Right 06/2020    TOTAL KNEE ARTHROPLASTY Left 01/19/2016    [place]/Dr. Mac

## 2024-07-29 ENCOUNTER — OFFICE VISIT (OUTPATIENT)
Dept: PAIN MANAGEMENT | Age: 74
End: 2024-07-29
Payer: MEDICARE

## 2024-07-29 ENCOUNTER — HOSPITAL ENCOUNTER (OUTPATIENT)
Age: 74
Setting detail: SPECIMEN
Discharge: HOME | End: 2024-07-29

## 2024-07-29 VITALS — HEIGHT: 67 IN | BODY MASS INDEX: 32.65 KG/M2 | WEIGHT: 208 LBS

## 2024-07-29 DIAGNOSIS — Z79.891 ENCOUNTER FOR LONG-TERM OPIATE ANALGESIC USE: Primary | ICD-10-CM

## 2024-07-29 DIAGNOSIS — M06.9 RHEUMATOID ARTHRITIS, INVOLVING UNSPECIFIED SITE, UNSPECIFIED WHETHER RHEUMATOID FACTOR PRESENT (HCC): ICD-10-CM

## 2024-07-29 DIAGNOSIS — M25.512 CHRONIC LEFT SHOULDER PAIN: ICD-10-CM

## 2024-07-29 DIAGNOSIS — G89.29 OTHER CHRONIC PAIN: ICD-10-CM

## 2024-07-29 DIAGNOSIS — M05.79 RHEUMATOID ARTHRITIS INVOLVING MULTIPLE SITES WITH POSITIVE RHEUMATOID FACTOR (HCC): ICD-10-CM

## 2024-07-29 DIAGNOSIS — M19.90 INFLAMMATORY ARTHROPATHY: ICD-10-CM

## 2024-07-29 DIAGNOSIS — Z79.891 ENCOUNTER FOR LONG-TERM OPIATE ANALGESIC USE: ICD-10-CM

## 2024-07-29 DIAGNOSIS — Z79.891 CHRONIC USE OF OPIATE FOR THERAPEUTIC PURPOSE: ICD-10-CM

## 2024-07-29 DIAGNOSIS — G89.29 CHRONIC LEFT SHOULDER PAIN: ICD-10-CM

## 2024-07-29 PROCEDURE — G8417 CALC BMI ABV UP PARAM F/U: HCPCS | Performed by: NURSE PRACTITIONER

## 2024-07-29 PROCEDURE — 99214 OFFICE O/P EST MOD 30 MIN: CPT | Performed by: NURSE PRACTITIONER

## 2024-07-29 PROCEDURE — 1036F TOBACCO NON-USER: CPT | Performed by: NURSE PRACTITIONER

## 2024-07-29 PROCEDURE — G8427 DOCREV CUR MEDS BY ELIG CLIN: HCPCS | Performed by: NURSE PRACTITIONER

## 2024-07-29 PROCEDURE — 1123F ACP DISCUSS/DSCN MKR DOCD: CPT | Performed by: NURSE PRACTITIONER

## 2024-07-29 RX ORDER — OXYCODONE HYDROCHLORIDE AND ACETAMINOPHEN 5; 325 MG/1; MG/1
1 TABLET ORAL EVERY 8 HOURS PRN
Qty: 90 TABLET | Refills: 0 | Status: SHIPPED | OUTPATIENT
Start: 2024-07-29 | End: 2024-08-28

## 2024-07-29 ASSESSMENT — ENCOUNTER SYMPTOMS
SHORTNESS OF BREATH: 0
CONSTIPATION: 0
BOWEL INCONTINENCE: 0
COUGH: 0
BACK PAIN: 1

## 2024-07-29 NOTE — PROGRESS NOTES
Chief Complaint   Patient presents with    Back Pain    Medication Refill     Percocet         [facility]   Patient complains of diffuse joint pain. Hx of rheumatoid arthritis and follows with rheumatologist. Currently prescribed diclofenac and states his methotrexate was d/c. He was taking prednisone daily but was advised by rheumatologist to taper off of this. He has not taken this since December 2023. He also takes percocet 5/325 TID for pain from our office. He has been stable and compliant on this dose for many years. He has had surgery on both shoulders and both knees.  Most recent was right TKR done 12/2022 with Dr. Mac. He still has some intermittent pain but this is managed with current medication. Has completed PT with some relief.        Back Pain  This is a chronic problem. The current episode started more than 1 year ago. The problem occurs constantly. The problem is unchanged. The pain is present in the lumbar spine. The quality of the pain is described as aching. The pain does not radiate. The pain is at a severity of 5/10. The pain is moderate. The pain is The same all the time. The symptoms are aggravated by bending, standing, twisting, sitting and position. Pertinent negatives include no bladder incontinence, bowel incontinence, chest pain or fever. He has tried heat, ice, home exercises, muscle relaxant, NSAIDs and chiropractic manipulation for the symptoms. The treatment provided mild relief.   Knee Pain   The incident occurred more than 1 week ago. There was no injury mechanism. The pain is present in the right knee. The quality of the pain is described as aching. The pain is at a severity of 7/10. The pain is moderate. The pain has been Fluctuating since onset. The symptoms are aggravated by movement and weight bearing. He has tried non-weight bearing and rest for the symptoms. The treatment provided mild relief.       Patient denies any new neurological symptoms. No bowel or bladder

## 2024-08-02 LAB

## 2024-08-22 ENCOUNTER — OFFICE VISIT (OUTPATIENT)
Dept: PAIN MANAGEMENT | Age: 74
End: 2024-08-22

## 2024-08-22 VITALS — BODY MASS INDEX: 32.65 KG/M2 | HEIGHT: 67 IN | WEIGHT: 208 LBS

## 2024-08-22 DIAGNOSIS — G89.29 OTHER CHRONIC PAIN: ICD-10-CM

## 2024-08-22 DIAGNOSIS — Z79.891 ENCOUNTER FOR LONG-TERM OPIATE ANALGESIC USE: ICD-10-CM

## 2024-08-22 DIAGNOSIS — M19.90 INFLAMMATORY ARTHROPATHY: ICD-10-CM

## 2024-08-22 RX ORDER — OXYCODONE HYDROCHLORIDE AND ACETAMINOPHEN 5; 325 MG/1; MG/1
1 TABLET ORAL EVERY 8 HOURS PRN
Qty: 90 TABLET | Refills: 0 | Status: SHIPPED | OUTPATIENT
Start: 2024-08-28 | End: 2024-09-27

## 2024-08-22 ASSESSMENT — ENCOUNTER SYMPTOMS
COUGH: 0
BACK PAIN: 1
SHORTNESS OF BREATH: 0
CONSTIPATION: 0
BOWEL INCONTINENCE: 0

## 2024-08-22 NOTE — PROGRESS NOTES
Chief Complaint   Patient presents with    Back Pain    Medication Refill         [facility]  Patient complains of diffuse joint pain. Hx of rheumatoid arthritis and follows with rheumatologist. Currently prescribed diclofenac and states his methotrexate was d/c. He was taking prednisone daily but was advised by rheumatologist to taper off of this. He has not taken this since December 2023. He also takes percocet 5/325 TID for pain from our office. He has been stable and compliant on this dose for many years. He has had surgery on both shoulders and both knees.  Most recent was right TKR done 12/2022 with Dr. Mac. He still has some intermittent pain but this is managed with current medication. Has completed PT with some relief.         Back Pain  This is a chronic problem. The current episode started more than 1 year ago. The problem occurs constantly. The problem has been gradually worsening since onset. The pain is present in the lumbar spine. The quality of the pain is described as aching. The pain does not radiate. The pain is at a severity of 4/10. The pain is mild. The pain is Worse during the night. The symptoms are aggravated by bending, twisting, position, lying down and sitting. Pertinent negatives include no bladder incontinence, bowel incontinence, chest pain or fever. He has tried home exercises, bed rest, walking, chiropractic manipulation, ice, heat, muscle relaxant, analgesics and NSAIDs for the symptoms. The treatment provided mild relief.     Patient denies any new neurological symptoms. No bowel or bladder incontinence, no weakness, and no falling.    Pill count: 22 present with patient - due 8/28    Morphine equivalent: 22.5    Controlled Substance Monitoring:    Acute and Chronic Pain Monitoring:   RX Monitoring Periodic Controlled Substance Monitoring   8/22/2024   2:36 PM Possible medication side effects, risk of tolerance/dependence & alternative treatments discussed.;No signs of

## 2024-09-26 ENCOUNTER — OFFICE VISIT (OUTPATIENT)
Dept: PAIN MANAGEMENT | Age: 74
End: 2024-09-26

## 2024-09-26 VITALS — BODY MASS INDEX: 32.65 KG/M2 | HEART RATE: 60 BPM | WEIGHT: 208 LBS | HEIGHT: 67 IN | OXYGEN SATURATION: 98 %

## 2024-09-26 DIAGNOSIS — Z79.891 ENCOUNTER FOR LONG-TERM OPIATE ANALGESIC USE: ICD-10-CM

## 2024-09-26 DIAGNOSIS — M19.90 INFLAMMATORY ARTHROPATHY: ICD-10-CM

## 2024-09-26 DIAGNOSIS — G89.29 OTHER CHRONIC PAIN: ICD-10-CM

## 2024-09-26 RX ORDER — OXYCODONE AND ACETAMINOPHEN 5; 325 MG/1; MG/1
1 TABLET ORAL EVERY 8 HOURS PRN
Qty: 90 TABLET | Refills: 0 | Status: SHIPPED | OUTPATIENT
Start: 2024-09-27 | End: 2024-10-27

## 2024-09-26 ASSESSMENT — ENCOUNTER SYMPTOMS
COUGH: 0
SHORTNESS OF BREATH: 0
BACK PAIN: 1
CONSTIPATION: 0
NAUSEA: 0

## 2024-10-04 SDOH — HEALTH STABILITY: PHYSICAL HEALTH: ON AVERAGE, HOW MANY MINUTES DO YOU ENGAGE IN EXERCISE AT THIS LEVEL?: 130 MIN

## 2024-10-04 SDOH — HEALTH STABILITY: PHYSICAL HEALTH: ON AVERAGE, HOW MANY DAYS PER WEEK DO YOU ENGAGE IN MODERATE TO STRENUOUS EXERCISE (LIKE A BRISK WALK)?: 3 DAYS

## 2024-10-04 ASSESSMENT — LIFESTYLE VARIABLES
HOW MANY STANDARD DRINKS CONTAINING ALCOHOL DO YOU HAVE ON A TYPICAL DAY: PATIENT DOES NOT DRINK
HOW MANY STANDARD DRINKS CONTAINING ALCOHOL DO YOU HAVE ON A TYPICAL DAY: 0
HOW OFTEN DO YOU HAVE SIX OR MORE DRINKS ON ONE OCCASION: 1
HOW OFTEN DO YOU HAVE A DRINK CONTAINING ALCOHOL: 1
HOW OFTEN DO YOU HAVE A DRINK CONTAINING ALCOHOL: NEVER

## 2024-10-04 ASSESSMENT — PATIENT HEALTH QUESTIONNAIRE - PHQ9
SUM OF ALL RESPONSES TO PHQ9 QUESTIONS 1 & 2: 0
1. LITTLE INTEREST OR PLEASURE IN DOING THINGS: NOT AT ALL
SUM OF ALL RESPONSES TO PHQ QUESTIONS 1-9: 0
2. FEELING DOWN, DEPRESSED OR HOPELESS: NOT AT ALL
SUM OF ALL RESPONSES TO PHQ QUESTIONS 1-9: 0

## 2024-10-07 ENCOUNTER — OFFICE VISIT (OUTPATIENT)
Dept: PRIMARY CARE CLINIC | Age: 74
End: 2024-10-07
Payer: MEDICARE

## 2024-10-07 VITALS
RESPIRATION RATE: 16 BRPM | OXYGEN SATURATION: 95 % | HEART RATE: 75 BPM | WEIGHT: 203.8 LBS | BODY MASS INDEX: 31.99 KG/M2 | DIASTOLIC BLOOD PRESSURE: 62 MMHG | HEIGHT: 67 IN | SYSTOLIC BLOOD PRESSURE: 136 MMHG

## 2024-10-07 DIAGNOSIS — M05.79 RHEUMATOID ARTHRITIS INVOLVING MULTIPLE SITES WITH POSITIVE RHEUMATOID FACTOR (HCC): ICD-10-CM

## 2024-10-07 DIAGNOSIS — E11.9 TYPE 2 DIABETES MELLITUS WITHOUT COMPLICATION, WITHOUT LONG-TERM CURRENT USE OF INSULIN (HCC): Primary | ICD-10-CM

## 2024-10-07 DIAGNOSIS — Z23 NEED FOR INFLUENZA VACCINATION: ICD-10-CM

## 2024-10-07 DIAGNOSIS — Z00.00 MEDICARE ANNUAL WELLNESS VISIT, SUBSEQUENT: ICD-10-CM

## 2024-10-07 DIAGNOSIS — I10 HYPERTENSION, UNSPECIFIED TYPE: ICD-10-CM

## 2024-10-07 DIAGNOSIS — E78.00 PURE HYPERCHOLESTEROLEMIA: ICD-10-CM

## 2024-10-07 LAB — HBA1C MFR BLD: 5.9 %

## 2024-10-07 PROCEDURE — G8482 FLU IMMUNIZE ORDER/ADMIN: HCPCS | Performed by: NURSE PRACTITIONER

## 2024-10-07 PROCEDURE — G0008 ADMIN INFLUENZA VIRUS VAC: HCPCS | Performed by: NURSE PRACTITIONER

## 2024-10-07 PROCEDURE — 83036 HEMOGLOBIN GLYCOSYLATED A1C: CPT | Performed by: NURSE PRACTITIONER

## 2024-10-07 PROCEDURE — G0439 PPPS, SUBSEQ VISIT: HCPCS | Performed by: NURSE PRACTITIONER

## 2024-10-07 PROCEDURE — 3075F SYST BP GE 130 - 139MM HG: CPT | Performed by: NURSE PRACTITIONER

## 2024-10-07 PROCEDURE — 90653 IIV ADJUVANT VACCINE IM: CPT | Performed by: NURSE PRACTITIONER

## 2024-10-07 PROCEDURE — 3044F HG A1C LEVEL LT 7.0%: CPT | Performed by: NURSE PRACTITIONER

## 2024-10-07 PROCEDURE — 3078F DIAST BP <80 MM HG: CPT | Performed by: NURSE PRACTITIONER

## 2024-10-07 PROCEDURE — 1123F ACP DISCUSS/DSCN MKR DOCD: CPT | Performed by: NURSE PRACTITIONER

## 2024-10-07 NOTE — PROGRESS NOTES
Vaccine Information Sheet, \"Influenza - Inactivated\"  given to Morris Becker Sr., or parent/legal guardian of  Morris Becker Sr. and verbalized understanding.   Injection was given in Left deltoid by Macarena Gibbs MA.     Patient responses:  Have you ever had a reaction to a flu vaccine? No  Are you able to eat eggs without adverse effects?  No  Do you have any current illness?  No  Have you ever had Guillian [place] Syndrome?  No    Flu vaccine given per order. Please see immunization tab.  Patient tolerated injection & left the office a few minutes later feeling well.

## 2024-10-07 NOTE — PATIENT INSTRUCTIONS
at a weight that's healthy for you. Talk to your doctor if you need help losing weight.     Try to get 7 to 9 hours of sleep each night.     Limit alcohol to 2 drinks a day for men and 1 drink a day for women. Too much alcohol can cause health problems.     Manage other health problems such as diabetes, high blood pressure, and high cholesterol. If you think you may have a problem with alcohol or drug use, talk to your doctor.   Medicines    Take your medicines exactly as prescribed. Call your doctor if you think you are having a problem with your medicine.     If your doctor recommends aspirin, take the amount directed each day. Make sure you take aspirin and not another kind of pain reliever, such as acetaminophen (Tylenol).   When should you call for help?   Call 911 if you have symptoms of a heart attack. These may include:    Chest pain or pressure, or a strange feeling in the chest.     Sweating.     Shortness of breath.     Pain, pressure, or a strange feeling in the back, neck, jaw, or upper belly or in one or both shoulders or arms.     Lightheadedness or sudden weakness.     A fast or irregular heartbeat.   After you call 911, the  may tell you to chew 1 adult-strength or 2 to 4 low-dose aspirin. Wait for an ambulance. Do not try to drive yourself.  Watch closely for changes in your health, and be sure to contact your doctor if you have any problems.  Where can you learn more?  Go to https://www.[employer].net/patientEd and enter F075 to learn more about \"A Healthy Heart: Care Instructions.\"  Current as of: June 24, 2023  Content Version: 14.2  © 2024 [employer].   Care instructions adapted under license by [employer]. If you have questions about a medical condition or this instruction, always ask your healthcare professional. [employer] disclaims any warranty or liability for your use of this information.      Personalized Preventive Plan for Morris Becker Sr. -

## 2024-10-28 ENCOUNTER — OFFICE VISIT (OUTPATIENT)
Dept: PAIN MANAGEMENT | Age: 74
End: 2024-10-28

## 2024-10-28 VITALS — WEIGHT: 195 LBS | BODY MASS INDEX: 30.61 KG/M2 | HEIGHT: 67 IN

## 2024-10-28 DIAGNOSIS — G89.29 OTHER CHRONIC PAIN: ICD-10-CM

## 2024-10-28 DIAGNOSIS — M19.90 INFLAMMATORY ARTHROPATHY: ICD-10-CM

## 2024-10-28 DIAGNOSIS — Z79.891 ENCOUNTER FOR LONG-TERM OPIATE ANALGESIC USE: ICD-10-CM

## 2024-10-28 RX ORDER — OXYCODONE AND ACETAMINOPHEN 5; 325 MG/1; MG/1
1 TABLET ORAL EVERY 8 HOURS PRN
Qty: 90 TABLET | Refills: 0 | Status: SHIPPED | OUTPATIENT
Start: 2024-10-28 | End: 2024-11-27

## 2024-10-28 ASSESSMENT — ENCOUNTER SYMPTOMS
COUGH: 0
CONSTIPATION: 0
BOWEL INCONTINENCE: 0
SHORTNESS OF BREATH: 0
BACK PAIN: 1

## 2024-10-28 NOTE — PROGRESS NOTES
daily, Disp: , Rfl:     CALCIUM PO, Take  by mouth., Disp: , Rfl:     Family History   Problem Relation Age of Onset    Alcohol Abuse Father     Heart Attack Father     Heart Disease Father     Heart Attack Brother     Heart Attack Brother        Social History     Socioeconomic History    Marital status:      Spouse name: Not on file    Number of children: Not on file    Years of education: Not on file    Highest education level: Not on file   Occupational History    Not on file   Tobacco Use    Smoking status: Former     Current packs/day: 0.00     Average packs/day: 1.5 packs/day for 25.0 years (37.5 ttl pk-yrs)     Types: Cigarettes     Start date: 1965     Quit date: 1980     Years since quittin.8    Smokeless tobacco: Never    Tobacco comments:     estimated quit date   Substance and Sexual Activity    Alcohol use: No    Drug use: No    Sexual activity: Yes     Partners: Female   Other Topics Concern    Not on file   Social History Narrative    Not on file     Social Determinants of Health     Financial Resource Strain: Low Risk  (3/26/2024)    Overall Financial Resource Strain (CARDIA)     Difficulty of Paying Living Expenses: Not hard at all   Food Insecurity: No Food Insecurity (3/26/2024)    Hunger Vital Sign     Worried About Running Out of Food in the Last Year: Never true     Ran Out of Food in the Last Year: Never true   Transportation Needs: Unknown (3/26/2024)    PRAPARE - Transportation     Lack of Transportation (Medical): Not on file     Lack of Transportation (Non-Medical): No   Physical Activity: Sufficiently Active (10/4/2024)    Exercise Vital Sign     Days of Exercise per Week: 3 days     Minutes of Exercise per Session: 130 min   Stress: Not on file   Social Connections: Not on file   Intimate Partner Violence: Not At Risk (3/22/2022)    Humiliation, Afraid, Rape, and Kick questionnaire     Fear of Current or Ex-Partner: No     Emotionally Abused: No     Physically

## 2024-10-30 DIAGNOSIS — I10 HYPERTENSION, UNSPECIFIED TYPE: ICD-10-CM

## 2024-10-30 RX ORDER — ENALAPRIL MALEATE 10 MG/1
TABLET ORAL
Qty: 90 TABLET | Refills: 0 | Status: SHIPPED | OUTPATIENT
Start: 2024-10-30

## 2024-10-30 RX ORDER — METOPROLOL TARTRATE 25 MG/1
TABLET, FILM COATED ORAL
Qty: 180 TABLET | Refills: 0 | Status: SHIPPED | OUTPATIENT
Start: 2024-10-30

## 2024-10-30 RX ORDER — AMLODIPINE BESYLATE 10 MG/1
TABLET ORAL
Qty: 90 TABLET | Refills: 0 | Status: SHIPPED | OUTPATIENT
Start: 2024-10-30

## 2024-11-18 ENCOUNTER — OFFICE VISIT (OUTPATIENT)
Dept: PAIN MANAGEMENT | Age: 74
End: 2024-11-18
Payer: MEDICARE

## 2024-11-18 VITALS — WEIGHT: 195 LBS | BODY MASS INDEX: 30.61 KG/M2 | HEIGHT: 67 IN

## 2024-11-18 DIAGNOSIS — G89.29 OTHER CHRONIC PAIN: ICD-10-CM

## 2024-11-18 DIAGNOSIS — Z79.891 ENCOUNTER FOR LONG-TERM OPIATE ANALGESIC USE: ICD-10-CM

## 2024-11-18 DIAGNOSIS — M19.90 INFLAMMATORY ARTHROPATHY: ICD-10-CM

## 2024-11-18 PROCEDURE — 99213 OFFICE O/P EST LOW 20 MIN: CPT | Performed by: NURSE PRACTITIONER

## 2024-11-18 PROCEDURE — G8482 FLU IMMUNIZE ORDER/ADMIN: HCPCS | Performed by: NURSE PRACTITIONER

## 2024-11-18 PROCEDURE — 1123F ACP DISCUSS/DSCN MKR DOCD: CPT | Performed by: NURSE PRACTITIONER

## 2024-11-18 PROCEDURE — 1159F MED LIST DOCD IN RCRD: CPT | Performed by: NURSE PRACTITIONER

## 2024-11-18 PROCEDURE — G8417 CALC BMI ABV UP PARAM F/U: HCPCS | Performed by: NURSE PRACTITIONER

## 2024-11-18 PROCEDURE — 1125F AMNT PAIN NOTED PAIN PRSNT: CPT | Performed by: NURSE PRACTITIONER

## 2024-11-18 PROCEDURE — G8427 DOCREV CUR MEDS BY ELIG CLIN: HCPCS | Performed by: NURSE PRACTITIONER

## 2024-11-18 PROCEDURE — 1160F RVW MEDS BY RX/DR IN RCRD: CPT | Performed by: NURSE PRACTITIONER

## 2024-11-18 PROCEDURE — 1036F TOBACCO NON-USER: CPT | Performed by: NURSE PRACTITIONER

## 2024-11-18 RX ORDER — OXYCODONE AND ACETAMINOPHEN 5; 325 MG/1; MG/1
1 TABLET ORAL EVERY 8 HOURS PRN
Qty: 90 TABLET | Refills: 0 | Status: SHIPPED | OUTPATIENT
Start: 2024-11-27 | End: 2024-12-27

## 2024-11-18 ASSESSMENT — ENCOUNTER SYMPTOMS
COUGH: 0
BACK PAIN: 1
CONSTIPATION: 0
SHORTNESS OF BREATH: 0

## 2024-11-18 NOTE — PROGRESS NOTES
TAKE 1 CAPSULE BY MOUTH 30 minutes before morning meal, Disp: , Rfl:     ELDERBERRY PO, Take 2 tablets by mouth daily, Disp: , Rfl:     Multiple Vitamins-Minerals (PRESERVISION/LUTEIN PO), Take 2 capsules by mouth daily, Disp: , Rfl:     folic acid (FOLVITE) 1 MG tablet, daily, Disp: , Rfl:     CALCIUM PO, Take  by mouth., Disp: , Rfl:     Family History   Problem Relation Age of Onset    Alcohol Abuse Father     Heart Attack Father     Heart Disease Father     Heart Attack Brother     Heart Attack Brother        Social History     Socioeconomic History    Marital status:      Spouse name: Not on file    Number of children: Not on file    Years of education: Not on file    Highest education level: Not on file   Occupational History    Not on file   Tobacco Use    Smoking status: Former     Current packs/day: 0.00     Average packs/day: 1.5 packs/day for 25.0 years (37.5 ttl pk-yrs)     Types: Cigarettes     Start date: 1965     Quit date: 1980     Years since quittin.9    Smokeless tobacco: Never    Tobacco comments:     estimated quit date   Substance and Sexual Activity    Alcohol use: No    Drug use: No    Sexual activity: Yes     Partners: Female   Other Topics Concern    Not on file   Social History Narrative    Not on file     Social Determinants of Health     Financial Resource Strain: Low Risk  (3/26/2024)    Overall Financial Resource Strain (CARDIA)     Difficulty of Paying Living Expenses: Not hard at all   Food Insecurity: No Food Insecurity (3/26/2024)    Hunger Vital Sign     Worried About Running Out of Food in the Last Year: Never true     Ran Out of Food in the Last Year: Never true   Transportation Needs: Unknown (3/26/2024)    PRAPARE - Transportation     Lack of Transportation (Medical): Not on file     Lack of Transportation (Non-Medical): No   Physical Activity: Sufficiently Active (10/4/2024)    Exercise Vital Sign     Days of Exercise per Week: 3 days     Minutes of

## 2024-12-02 RX ORDER — ATORVASTATIN CALCIUM 20 MG/1
TABLET, FILM COATED ORAL
Qty: 90 TABLET | Refills: 1 | Status: SHIPPED | OUTPATIENT
Start: 2024-12-02

## 2024-12-14 DIAGNOSIS — N52.9 ERECTILE DYSFUNCTION, UNSPECIFIED ERECTILE DYSFUNCTION TYPE: ICD-10-CM

## 2024-12-16 RX ORDER — SILDENAFIL 100 MG/1
100 TABLET, FILM COATED ORAL DAILY PRN
Qty: 30 TABLET | Refills: 2 | Status: SHIPPED | OUTPATIENT
Start: 2024-12-16

## 2024-12-16 NOTE — TELEPHONE ENCOUNTER
Morris Becker Sr. is requesting a refill on the following medication(s):  Requested Prescriptions     Pending Prescriptions Disp Refills    sildenafil (VIAGRA) 100 MG tablet 30 tablet 2     Sig: Take 1 tablet by mouth daily as needed for Erectile Dysfunction       Last Visit Date (If Applicable):  10/7/2024    Next Visit Date:    4/7/2025

## 2024-12-17 ENCOUNTER — OFFICE VISIT (OUTPATIENT)
Dept: PAIN MANAGEMENT | Age: 74
End: 2024-12-17
Payer: MEDICARE

## 2024-12-17 VITALS — BODY MASS INDEX: 30.61 KG/M2 | WEIGHT: 195 LBS | HEIGHT: 67 IN

## 2024-12-17 DIAGNOSIS — M19.90 INFLAMMATORY ARTHROPATHY: ICD-10-CM

## 2024-12-17 DIAGNOSIS — Z79.891 ENCOUNTER FOR LONG-TERM OPIATE ANALGESIC USE: ICD-10-CM

## 2024-12-17 DIAGNOSIS — G89.29 OTHER CHRONIC PAIN: ICD-10-CM

## 2024-12-17 PROCEDURE — 1036F TOBACCO NON-USER: CPT | Performed by: PAIN MEDICINE

## 2024-12-17 PROCEDURE — G8427 DOCREV CUR MEDS BY ELIG CLIN: HCPCS | Performed by: PAIN MEDICINE

## 2024-12-17 PROCEDURE — G8482 FLU IMMUNIZE ORDER/ADMIN: HCPCS | Performed by: PAIN MEDICINE

## 2024-12-17 PROCEDURE — 1159F MED LIST DOCD IN RCRD: CPT | Performed by: PAIN MEDICINE

## 2024-12-17 PROCEDURE — G8417 CALC BMI ABV UP PARAM F/U: HCPCS | Performed by: PAIN MEDICINE

## 2024-12-17 PROCEDURE — 1123F ACP DISCUSS/DSCN MKR DOCD: CPT | Performed by: PAIN MEDICINE

## 2024-12-17 PROCEDURE — 99214 OFFICE O/P EST MOD 30 MIN: CPT | Performed by: PAIN MEDICINE

## 2024-12-17 RX ORDER — OXYCODONE AND ACETAMINOPHEN 5; 325 MG/1; MG/1
1 TABLET ORAL EVERY 8 HOURS PRN
Qty: 90 TABLET | Refills: 0 | Status: SHIPPED | OUTPATIENT
Start: 2024-12-17 | End: 2025-01-16

## 2024-12-17 NOTE — PROGRESS NOTES
tablet, Rfl: 1    amLODIPine (NORVASC) 10 MG tablet, TAKE 1 TABLET BY MOUTH EVERY DAY, Disp: 90 tablet, Rfl: 0    metoprolol tartrate (LOPRESSOR) 25 MG tablet, TAKE 1 TABLET BY MOUTH 2 TIMES A DAY, Disp: 180 tablet, Rfl: 0    enalapril (VASOTEC) 10 MG tablet, TAKE 1 TABLET BY MOUTH EVERY DAY, Disp: 90 tablet, Rfl: 0    Iron-Vitamin C (IRON 100/C) 100-250 MG TABS, Iron, Disp: , Rfl:     diclofenac (VOLTAREN) 75 MG EC tablet, Take 1 tablet by mouth 2 times daily as needed, Disp: , Rfl:     omeprazole (PRILOSEC) 20 MG delayed release capsule, TAKE 1 CAPSULE BY MOUTH 30 minutes before morning meal, Disp: , Rfl:     ELDERBERRY PO, Take 2 tablets by mouth daily, Disp: , Rfl:     Multiple Vitamins-Minerals (PRESERVISION/LUTEIN PO), Take 2 capsules by mouth daily, Disp: , Rfl:     folic acid (FOLVITE) 1 MG tablet, daily, Disp: , Rfl:     CALCIUM PO, Take  by mouth., Disp: , Rfl:     Family History   Problem Relation Age of Onset    Alcohol Abuse Father     Heart Attack Father     Heart Disease Father     Heart Attack Brother     Heart Attack Brother        Social History     Socioeconomic History    Marital status:      Spouse name: Not on file    Number of children: Not on file    Years of education: Not on file    Highest education level: Not on file   Occupational History    Not on file   Tobacco Use    Smoking status: Former     Current packs/day: 0.00     Average packs/day: 1.5 packs/day for 25.0 years (37.5 ttl pk-yrs)     Types: Cigarettes     Start date: 1965     Quit date: 1980     Years since quittin.9    Smokeless tobacco: Never    Tobacco comments:     estimated quit date   Substance and Sexual Activity    Alcohol use: No    Drug use: No    Sexual activity: Yes     Partners: Female   Other Topics Concern    Not on file   Social History Narrative    Not on file     Social Determinants of Health     Financial Resource Strain: Low Risk  (3/26/2024)    Overall Financial Resource Strain (CARDIA)

## 2025-01-06 DIAGNOSIS — N52.9 ERECTILE DYSFUNCTION, UNSPECIFIED ERECTILE DYSFUNCTION TYPE: ICD-10-CM

## 2025-01-06 RX ORDER — SILDENAFIL 100 MG/1
100 TABLET, FILM COATED ORAL DAILY PRN
Qty: 30 TABLET | Refills: 2 | Status: SHIPPED | OUTPATIENT
Start: 2025-01-06

## 2025-01-06 NOTE — TELEPHONE ENCOUNTER
\" [facility] usually sends my Sildenafil refill on line to [email or web address]  \" ??        Last Visit Date: 10/7/2024   Next Visit Date: 4/7/2025

## 2025-01-23 ENCOUNTER — HOSPITAL ENCOUNTER (OUTPATIENT)
Age: 75
Setting detail: SPECIMEN
Discharge: HOME | End: 2025-01-23

## 2025-01-23 ENCOUNTER — OFFICE VISIT (OUTPATIENT)
Dept: PAIN MANAGEMENT | Age: 75
End: 2025-01-23
Payer: MEDICARE

## 2025-01-23 VITALS — BODY MASS INDEX: 30.61 KG/M2 | HEIGHT: 67 IN | WEIGHT: 195 LBS

## 2025-01-23 DIAGNOSIS — M25.512 CHRONIC LEFT SHOULDER PAIN: ICD-10-CM

## 2025-01-23 DIAGNOSIS — M05.79 RHEUMATOID ARTHRITIS INVOLVING MULTIPLE SITES WITH POSITIVE RHEUMATOID FACTOR (HCC): ICD-10-CM

## 2025-01-23 DIAGNOSIS — Z79.891 ENCOUNTER FOR LONG-TERM OPIATE ANALGESIC USE: ICD-10-CM

## 2025-01-23 DIAGNOSIS — Z79.891 CHRONIC USE OF OPIATE FOR THERAPEUTIC PURPOSE: Primary | ICD-10-CM

## 2025-01-23 DIAGNOSIS — M06.9 RHEUMATOID ARTHRITIS, INVOLVING UNSPECIFIED SITE, UNSPECIFIED WHETHER RHEUMATOID FACTOR PRESENT (HCC): ICD-10-CM

## 2025-01-23 DIAGNOSIS — M19.90 INFLAMMATORY ARTHROPATHY: ICD-10-CM

## 2025-01-23 DIAGNOSIS — G89.29 CHRONIC LEFT SHOULDER PAIN: ICD-10-CM

## 2025-01-23 PROCEDURE — 1159F MED LIST DOCD IN RCRD: CPT | Performed by: NURSE PRACTITIONER

## 2025-01-23 PROCEDURE — 1160F RVW MEDS BY RX/DR IN RCRD: CPT | Performed by: NURSE PRACTITIONER

## 2025-01-23 PROCEDURE — G8427 DOCREV CUR MEDS BY ELIG CLIN: HCPCS | Performed by: NURSE PRACTITIONER

## 2025-01-23 PROCEDURE — 1036F TOBACCO NON-USER: CPT | Performed by: NURSE PRACTITIONER

## 2025-01-23 PROCEDURE — 99214 OFFICE O/P EST MOD 30 MIN: CPT | Performed by: NURSE PRACTITIONER

## 2025-01-23 PROCEDURE — 1123F ACP DISCUSS/DSCN MKR DOCD: CPT | Performed by: NURSE PRACTITIONER

## 2025-01-23 PROCEDURE — G8417 CALC BMI ABV UP PARAM F/U: HCPCS | Performed by: NURSE PRACTITIONER

## 2025-01-23 RX ORDER — OXYCODONE AND ACETAMINOPHEN 5; 325 MG/1; MG/1
1 TABLET ORAL EVERY 8 HOURS PRN
Qty: 90 TABLET | Refills: 0 | Status: SHIPPED | OUTPATIENT
Start: 2025-01-27 | End: 2025-02-26

## 2025-01-23 RX ORDER — OXYCODONE AND ACETAMINOPHEN 5; 325 MG/1; MG/1
1 TABLET ORAL EVERY 8 HOURS PRN
COMMUNITY
Start: 2024-12-27 | End: 2025-01-23 | Stop reason: SDUPTHER

## 2025-01-23 RX ORDER — OXYCODONE AND ACETAMINOPHEN 5; 325 MG/1; MG/1
1 TABLET ORAL EVERY 8 HOURS PRN
Status: CANCELLED | OUTPATIENT
Start: 2025-01-23

## 2025-01-23 ASSESSMENT — ENCOUNTER SYMPTOMS
SHORTNESS OF BREATH: 0
BACK PAIN: 1
COUGH: 0
CONSTIPATION: 0

## 2025-01-23 NOTE — PROGRESS NOTES
Chief Complaint   Patient presents with    Back Pain         [facility]  Patient complains of diffuse joint pain. Hx of rheumatoid arthritis and follows with rheumatologist. Currently prescribed diclofenac and states his methotrexate was d/c. He was taking prednisone daily but was advised by rheumatologist to taper off of this. He has not taken this since December 2023. He also takes percocet 5/325 TID for pain from our office. He has been stable and compliant on this dose for many years. He has had surgery on both shoulders and both knees.  Most recent was right TKR done 12/2022 with Dr. Mac. He still has some intermittent pain but this is managed with current medication. Has completed PT with some relief.         Back Pain  The current episode started more than 1 year ago. The problem occurs constantly. The problem is unchanged. The pain is present in the lumbar spine. The quality of the pain is described as aching and stabbing. The pain does not radiate. The pain is The same all the time. The symptoms are aggravated by bending, sitting, standing and position. Pertinent negatives include no chest pain or fever. He has tried ice, heat and analgesics for the symptoms. The treatment provided mild relief.     Patient denies any new neurological symptoms. No bowel or bladder incontinence, no weakness, and no falling.    Pill count: 14-appropriate - 1/26    Morphine equivalent: 22.5    Controlled Substance Monitoring:    Acute and Chronic Pain Monitoring:   RX Monitoring Periodic Controlled Substance Monitoring   1/23/2025   9:55 AM Possible medication side effects, risk of tolerance/dependence & alternative treatments discussed.;No signs of potential drug abuse or diversion identified.;Assessed functional status (ability to engage in work or other purposeful activities, the pain intensity and its interference with activities of daily living, quality of family life and social activities, and the physical

## 2025-01-24 DIAGNOSIS — Z79.891 CHRONIC USE OF OPIATE FOR THERAPEUTIC PURPOSE: ICD-10-CM

## 2025-01-24 LAB
6-ACETYLMORPHINE, UR: NORMAL
7-AMINOCLONAZEPAM, URINE: NORMAL
ALPHA-OH-ALPRAZ, URINE: NORMAL
ALPHA-OH-MIDAZOLAM, URINE: NORMAL
ALPRAZOLAM, URINE: NORMAL
AMPHETAMINE, URINE: NORMAL
BARBITURATES, URINE: NORMAL
BENZOYLECGONINE, UR: NORMAL
BUPRENORPHINE URINE: NORMAL
CARISOPRODOL, UR: NORMAL
CLONAZEPAM, URINE: NORMAL
CODEINE, URINE: NORMAL
CREAT UR-MCNC: NORMAL MG/DL
DIAZEPAM, URINE: NORMAL
DRUGS EXPECTED, UR: NORMAL
EER HI RES INTERP UR: NORMAL
ETHYL GLUCURONIDE UR: NORMAL
FENTANYL URINE: NORMAL
GABAPENTIN: NORMAL
HYDROCODONE, URINE: NORMAL
HYDROMORPHONE, URINE: NORMAL
LORAZEPAM, URINE: NORMAL
MARIJUANA METAB, UR: NORMAL
MDA, URINE: NORMAL
MDEA, EVE, UR: NORMAL
MDMA, URINE: NORMAL
MEPERIDINE METAB, UR: NORMAL
METHADONE, URINE: NORMAL
METHAMPHETAMINE, URINE: NORMAL
METHYLPHENIDATE: NORMAL
MIDAZOLAM, URINE: NORMAL
MORPHINE, OPI1M: NORMAL
NALOXONE URINE: NORMAL
NORBUPRENORPHINE, URINE: NORMAL
NORDIAZEPAM, URINE: NORMAL
NORFENTANYL, URINE: NORMAL
NORHYDROCODONE, URINE: NORMAL
NOROXYCODONE, URINE: NORMAL
NOROXYMORPHONE, URINE: NORMAL
OXAZEPAM, URINE: NORMAL
OXYCODONE URINE: NORMAL
OXYMORPHONE, URINE: NORMAL
PAIN MANAGEMENT DRUG PANEL INTERP, URINE: NORMAL
PAIN MGT DRUG PANEL, HI RES, UR: NORMAL
PCP,URINE: NORMAL
PHENTERMINE, UR: NORMAL
PREGABALIN: NORMAL
TAPENTADOL, URINE: NORMAL
TAPENTADOL-O-SULFATE, URINE: NORMAL
TEMAZEPAM, URINE: NORMAL
TRAMADOL, URINE: NORMAL
ZOLPIDEM METABOLITE (ZCA), URINE: NORMAL
ZOLPIDEM, URINE: NORMAL

## 2025-01-27 NOTE — TELEPHONE ENCOUNTER
INFECTIOUS DISEASE PROGRESS NOTE    ASSESSMENT:   1.  Acute C. difficile colitis  2.  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  Pancytopenia  4.  History of perirectal abscess, chronic drain-does not appear infected around site  5.  History of levofloxacin intolerance with significant tendinopathy  6.  History of latent TB status posttreatment    PLAN:   CT chest with scattered pulmonary nodules.  Patient endorses that she used to be an avid .  She also used to live in [place] for several years  Previously had a history of latent TB but she was treated with INH for over a year  Blasto antigen negative, Coccidioides complement fixation negative, urine histo antigen negative  Continue vanc for c. Diff ppx  Continue cefpodoxime for prophylaxis  Continue valacyclovir and posaconazole for prophylaxis      Louann Reyez MD   [phone]    -------------------------------------------------------------------------------------------------------------------    SUBJECTIVE:  Afebrile, no new events over the weekend.  Denies any nausea, vomiting, abdominal pain  OBJECTIVE:  Tmax Temp (24hrs), Av °F (36.7 °C), Min:97.3 °F (36.3 °C), Max:98.2 °F (36.8 °C)     Last Vitals   Vitals:    25 0959   BP: 114/69   Pulse: 72   Resp: 18   Temp: 98.2 °F (36.8 °C)       Gen: NAD, awake, alert  HEENT: Anicteric, oropharynx is clear  CV: Regular rate rhythm S1 and S2  Pulm: Clear to auscultation, no increased respiratory effort  Abd: Soft, nontender, nondistended  Ext: no rash, no edema  Psych: Appropriate mood and affect  Skin: No rash    ANTIMICROBIALS  Enteral vancomycin  Cefpodoxime  Valacyclovir  Posaconazole    LAB:  Recent Labs     25  0434 255 25   WBC 0.1* 0.1* 0.1*   HGB 7.8* 7.5* 7.7*   HCT 23.6* 22.7* 23.8*   PLT 13* 15* 15*   MCV 85.8 86.0 85.9       Recent Labs   Lab 25  0234 25 
Temitope Joel called he is almost out of Oxycodone 5-325, his next queenie is 09/02/2022 please advise.
01/25/25  0434   SODIUM 145 143 142   POTASSIUM 3.7 3.7 3.9   CHLORIDE 106 106 106   CO2 28 28 27   BUN 12 13 14   CREATININE 0.64 0.68 0.64   GLUCOSE 101* 107* 110*   CALCIUM 8.9 8.7 8.7       Microbiology Results       None              RADIOLOGY: images reviewed       Note:  Assessment and Plan have been moved to the top of the screen for ease of the viewer such that the plan can be seen without scrolling to the bottom of the note.          
Bladder non-tender and non-distended. Urine clear yellow.

## 2025-01-28 LAB
6-ACETYLMORPHINE, UR: NOT DETECTED
7-AMINOCLONAZEPAM, URINE: NOT DETECTED
ALPHA-OH-ALPRAZ, URINE: NOT DETECTED
ALPHA-OH-MIDAZOLAM, URINE: NOT DETECTED
ALPRAZOLAM, URINE: NOT DETECTED
AMPHETAMINE, URINE: NOT DETECTED
BARBITURATES, URINE: NEGATIVE
BENZOYLECGONINE, UR: NEGATIVE
BUPRENORPHINE URINE: NOT DETECTED
CARISOPRODOL, UR: NEGATIVE
CLONAZEPAM, URINE: NOT DETECTED
CODEINE, URINE: NOT DETECTED
CREAT UR-MCNC: 187.8 MG/DL (ref 20–400)
DIAZEPAM, URINE: NOT DETECTED
DRUGS EXPECTED, UR: NORMAL
EER HI RES INTERP UR: NORMAL
ETHYL GLUCURONIDE UR: NEGATIVE
FENTANYL URINE: NOT DETECTED
GABAPENTIN: NOT DETECTED
HYDROCODONE, URINE: NOT DETECTED
HYDROMORPHONE, URINE: NOT DETECTED
LORAZEPAM, URINE: NOT DETECTED
MARIJUANA METAB, UR: NEGATIVE
MDA, URINE: NOT DETECTED
MDEA, EVE, UR: NOT DETECTED
MDMA, URINE: NOT DETECTED
MEPERIDINE METAB, UR: NOT DETECTED
METHADONE, URINE: NEGATIVE
METHAMPHETAMINE, URINE: NOT DETECTED
METHYLPHENIDATE: NOT DETECTED
MIDAZOLAM, URINE: NOT DETECTED
MORPHINE, OPI1M: NOT DETECTED
NALOXONE URINE: NOT DETECTED
NORBUPRENORPHINE, URINE: NOT DETECTED
NORDIAZEPAM, URINE: NOT DETECTED
NORFENTANYL, URINE: NOT DETECTED
NORHYDROCODONE, URINE: NOT DETECTED
NOROXYCODONE, URINE: PRESENT
NOROXYMORPHONE, URINE: PRESENT
OXAZEPAM, URINE: NOT DETECTED
OXYCODONE URINE: PRESENT
OXYMORPHONE, URINE: PRESENT
PAIN MANAGEMENT DRUG PANEL INTERP, URINE: NORMAL
PAIN MGT DRUG PANEL, HI RES, UR: NORMAL
PCP,URINE: NEGATIVE
PHENTERMINE, UR: NOT DETECTED
PREGABALIN: NOT DETECTED
TAPENTADOL, URINE: NOT DETECTED
TAPENTADOL-O-SULFATE, URINE: NOT DETECTED
TEMAZEPAM, URINE: NOT DETECTED
TRAMADOL, URINE: NEGATIVE
ZOLPIDEM METABOLITE (ZCA), URINE: NOT DETECTED
ZOLPIDEM, URINE: NOT DETECTED

## 2025-01-30 DIAGNOSIS — I10 HYPERTENSION, UNSPECIFIED TYPE: ICD-10-CM

## 2025-01-30 RX ORDER — AMLODIPINE BESYLATE 10 MG/1
TABLET ORAL
Qty: 90 TABLET | Refills: 1 | Status: SHIPPED | OUTPATIENT
Start: 2025-01-30

## 2025-01-30 RX ORDER — ENALAPRIL MALEATE 10 MG/1
TABLET ORAL
Qty: 90 TABLET | Refills: 1 | Status: SHIPPED | OUTPATIENT
Start: 2025-01-30

## 2025-01-30 RX ORDER — METOPROLOL TARTRATE 25 MG/1
TABLET, FILM COATED ORAL
Qty: 180 TABLET | Refills: 1 | Status: SHIPPED | OUTPATIENT
Start: 2025-01-30

## 2025-02-24 ENCOUNTER — OFFICE VISIT (OUTPATIENT)
Dept: PAIN MANAGEMENT | Age: 75
End: 2025-02-24
Payer: MEDICARE

## 2025-02-24 VITALS — WEIGHT: 195 LBS | BODY MASS INDEX: 30.61 KG/M2 | HEIGHT: 67 IN

## 2025-02-24 DIAGNOSIS — M06.9 RHEUMATOID ARTHRITIS, INVOLVING UNSPECIFIED SITE, UNSPECIFIED WHETHER RHEUMATOID FACTOR PRESENT (HCC): Primary | ICD-10-CM

## 2025-02-24 DIAGNOSIS — G89.29 OTHER CHRONIC PAIN: ICD-10-CM

## 2025-02-24 DIAGNOSIS — Z79.891 ENCOUNTER FOR LONG-TERM OPIATE ANALGESIC USE: ICD-10-CM

## 2025-02-24 PROCEDURE — 1123F ACP DISCUSS/DSCN MKR DOCD: CPT | Performed by: PAIN MEDICINE

## 2025-02-24 PROCEDURE — G8417 CALC BMI ABV UP PARAM F/U: HCPCS | Performed by: PAIN MEDICINE

## 2025-02-24 PROCEDURE — 1159F MED LIST DOCD IN RCRD: CPT | Performed by: PAIN MEDICINE

## 2025-02-24 PROCEDURE — 99213 OFFICE O/P EST LOW 20 MIN: CPT | Performed by: PAIN MEDICINE

## 2025-02-24 PROCEDURE — G8427 DOCREV CUR MEDS BY ELIG CLIN: HCPCS | Performed by: PAIN MEDICINE

## 2025-02-24 PROCEDURE — 1036F TOBACCO NON-USER: CPT | Performed by: PAIN MEDICINE

## 2025-02-24 RX ORDER — OXYCODONE AND ACETAMINOPHEN 5; 325 MG/1; MG/1
1 TABLET ORAL EVERY 8 HOURS PRN
Qty: 90 TABLET | Refills: 0 | Status: SHIPPED | OUTPATIENT
Start: 2025-02-24 | End: 2025-03-26

## 2025-02-24 NOTE — PROGRESS NOTES
(3/26/2024)    Overall Financial Resource Strain (CARDIA)     Difficulty of Paying Living Expenses: Not hard at all   Food Insecurity: No Food Insecurity (3/26/2024)    Hunger Vital Sign     Worried About Running Out of Food in the Last Year: Never true     Ran Out of Food in the Last Year: Never true   Transportation Needs: Unknown (3/26/2024)    PRAPARE - Transportation     Lack of Transportation (Medical): Not on file     Lack of Transportation (Non-Medical): No   Physical Activity: Sufficiently Active (10/4/2024)    Exercise Vital Sign     Days of Exercise per Week: 3 days     Minutes of Exercise per Session: 130 min   Stress: Not on file   Social Connections: Not on file   Intimate Partner Violence: Not At Risk (3/22/2022)    Humiliation, Afraid, Rape, and Kick questionnaire     Fear of Current or Ex-Partner: No     Emotionally Abused: No     Physically Abused: No     Sexually Abused: No   Housing Stability: Unknown (3/26/2024)    Housing Stability Vital Sign     Unable to Pay for Housing in the Last Year: Not on file     Number of Places Lived in the Last Year: Not on file     Unstable Housing in the Last Year: No       Review of Systems:  Review of Systems   Musculoskeletal:  Positive for arthralgias.       Physical Exam:  Ht 1.702 m (5' 7\")   Wt 88.5 kg (195 lb)   BMI 30.54 kg/m²     Physical Exam    Constitutional:       Appearance: Normal appearance.   Pulmonary:      Effort: Pulmonary effort is normal.   Neurological:      Mental Status: Alert.   Psychiatric:         Attention and Perception: Attention and perception normal.         Mood and Affect: Mood and affect normal.       Record/Diagnostics Review:    As above    Orders:    Orders Placed This Encounter   Medications    oxyCODONE-acetaminophen (PERCOCET) 5-325 MG per tablet     Sig: Take 1 tablet by mouth every 8 hours as needed for Pain for up to 30 days. Max Daily Amount: 3 tablets     Dispense:  90 tablet     Refill:  0     Reduce doses taken

## 2025-03-27 ENCOUNTER — OFFICE VISIT (OUTPATIENT)
Dept: PAIN MANAGEMENT | Age: 75
End: 2025-03-27
Payer: MEDICARE

## 2025-03-27 VITALS — HEIGHT: 67 IN | WEIGHT: 195 LBS | BODY MASS INDEX: 30.61 KG/M2

## 2025-03-27 DIAGNOSIS — M25.512 CHRONIC LEFT SHOULDER PAIN: ICD-10-CM

## 2025-03-27 DIAGNOSIS — M06.9 RHEUMATOID ARTHRITIS, INVOLVING UNSPECIFIED SITE, UNSPECIFIED WHETHER RHEUMATOID FACTOR PRESENT (HCC): ICD-10-CM

## 2025-03-27 DIAGNOSIS — M19.90 INFLAMMATORY ARTHROPATHY: ICD-10-CM

## 2025-03-27 DIAGNOSIS — G89.29 CHRONIC LEFT SHOULDER PAIN: ICD-10-CM

## 2025-03-27 DIAGNOSIS — Z79.891 ENCOUNTER FOR LONG-TERM OPIATE ANALGESIC USE: Primary | ICD-10-CM

## 2025-03-27 PROCEDURE — 1123F ACP DISCUSS/DSCN MKR DOCD: CPT | Performed by: NURSE PRACTITIONER

## 2025-03-27 PROCEDURE — 1159F MED LIST DOCD IN RCRD: CPT | Performed by: NURSE PRACTITIONER

## 2025-03-27 PROCEDURE — 1160F RVW MEDS BY RX/DR IN RCRD: CPT | Performed by: NURSE PRACTITIONER

## 2025-03-27 PROCEDURE — 1036F TOBACCO NON-USER: CPT | Performed by: NURSE PRACTITIONER

## 2025-03-27 PROCEDURE — G8427 DOCREV CUR MEDS BY ELIG CLIN: HCPCS | Performed by: NURSE PRACTITIONER

## 2025-03-27 PROCEDURE — 99213 OFFICE O/P EST LOW 20 MIN: CPT | Performed by: NURSE PRACTITIONER

## 2025-03-27 PROCEDURE — G8417 CALC BMI ABV UP PARAM F/U: HCPCS | Performed by: NURSE PRACTITIONER

## 2025-03-27 RX ORDER — OXYCODONE AND ACETAMINOPHEN 5; 325 MG/1; MG/1
1 TABLET ORAL EVERY 8 HOURS PRN
Qty: 90 TABLET | Refills: 0 | Status: SHIPPED | OUTPATIENT
Start: 2025-03-27 | End: 2025-04-26

## 2025-03-27 ASSESSMENT — ENCOUNTER SYMPTOMS
COUGH: 0
CONSTIPATION: 0
SHORTNESS OF BREATH: 0

## 2025-03-27 NOTE — PROGRESS NOTES
Chief Complaint   Patient presents with    Joint Pain         [facility]    Patient complains of diffuse joint pain. Hx of rheumatoid arthritis and follows with rheumatologist. Currently prescribed diclofenac and states his methotrexate was d/c. He was taking prednisone daily but was advised by rheumatologist to taper off of this. He has not taken this since December 2023. He also takes percocet 5/325 TID for pain from our office. He has been stable and compliant on this dose for many years. He has had surgery on both shoulders and both knees.  Most recent was right TKR done 12/2022 with Dr. Mac. He still has some intermittent pain but this is managed with current medication. Has completed PT with some relief.        Joint Pain   This is a chronic problem. The current episode started more than 1 year ago. There has been no history of extremity trauma. The problem occurs constantly. The problem has been unchanged. The quality of the pain is described as aching and sharp. The pain is at a severity of 4/10. The pain is moderate. Pertinent negatives include no fever. The symptoms are aggravated by activity. He has tried acetaminophen, NSAIDS, heat, cold, movement and rest for the symptoms. His past medical history is significant for rheumatoid arthritis.     Patient denies any new neurological symptoms. No bowel or bladder incontinence, no weakness, and no falling.    Pill count: appropriate - was due 3/26     Morphine equivalent: 22.5     Controlled Substance Monitoring:    Acute and Chronic Pain Monitoring:   RX Monitoring Periodic Controlled Substance Monitoring   3/27/2025   8:20 AM Possible medication side effects, risk of tolerance/dependence & alternative treatments discussed.;No signs of potential drug abuse or diversion identified.;Assessed functional status (ability to engage in work or other purposeful activities, the pain intensity and its interference with activities of daily living, quality of

## 2025-04-25 ENCOUNTER — OFFICE VISIT (OUTPATIENT)
Dept: PAIN MANAGEMENT | Age: 75
End: 2025-04-25
Payer: MEDICARE

## 2025-04-25 VITALS — BODY MASS INDEX: 30.61 KG/M2 | WEIGHT: 195 LBS | HEIGHT: 67 IN

## 2025-04-25 DIAGNOSIS — M25.50 POLYARTHRALGIA: ICD-10-CM

## 2025-04-25 DIAGNOSIS — M06.9 RHEUMATOID ARTHRITIS, INVOLVING UNSPECIFIED SITE, UNSPECIFIED WHETHER RHEUMATOID FACTOR PRESENT (HCC): ICD-10-CM

## 2025-04-25 DIAGNOSIS — Z79.891 ENCOUNTER FOR LONG-TERM OPIATE ANALGESIC USE: ICD-10-CM

## 2025-04-25 DIAGNOSIS — G89.29 OTHER CHRONIC PAIN: Primary | ICD-10-CM

## 2025-04-25 PROCEDURE — 1036F TOBACCO NON-USER: CPT | Performed by: PAIN MEDICINE

## 2025-04-25 PROCEDURE — 99213 OFFICE O/P EST LOW 20 MIN: CPT | Performed by: PAIN MEDICINE

## 2025-04-25 PROCEDURE — 1123F ACP DISCUSS/DSCN MKR DOCD: CPT | Performed by: PAIN MEDICINE

## 2025-04-25 PROCEDURE — G8417 CALC BMI ABV UP PARAM F/U: HCPCS | Performed by: PAIN MEDICINE

## 2025-04-25 PROCEDURE — G8427 DOCREV CUR MEDS BY ELIG CLIN: HCPCS | Performed by: PAIN MEDICINE

## 2025-04-25 PROCEDURE — 1159F MED LIST DOCD IN RCRD: CPT | Performed by: PAIN MEDICINE

## 2025-04-25 RX ORDER — OXYCODONE AND ACETAMINOPHEN 5; 325 MG/1; MG/1
1 TABLET ORAL EVERY 8 HOURS PRN
Qty: 90 TABLET | Refills: 0 | Status: SHIPPED | OUTPATIENT
Start: 2025-04-25 | End: 2025-05-25

## 2025-04-25 NOTE — PROGRESS NOTES
HPI:     Joint Pain   The pain is present in the neck, back, right hand, right fingers, left fingers, right arm, left arm, left hand, right foot and left foot. This is a chronic problem. The current episode started more than 1 year ago. There has been no history of extremity trauma. The problem occurs constantly. The problem has been unchanged. The quality of the pain is described as sharp, pounding and aching. The pain is moderate. The symptoms are aggravated by activity, lying down and standing. He has tried heat, cold and rest for the symptoms.     Chronic diffuse joint pain.  History of bilateral knee replacement.  Also has had bilateral shoulder surgeries.  Takes Percocet as needed for the pain.  Also follows with rheumatology.    Pain ranges from a 3/10 to a 10/10 depending on activity.    Patient denies any new neurological symptoms. Nobowel or bladder incontinence, no weakness, and no falling.    Review of [place]RS does not show any aberrant prescription behavior. Medication is helping the patient stay active. Patient denies any side effects and reports adequate analgesia. No sign of misuse/abuse.    Past Medical History:   Diagnosis Date    Calcium pyrophosphate deposition disease     Diverticulosis of colon     Erectile dysfunction 2015    Hypertension     Osteoarthritis        Past Surgical History:   Procedure Laterality Date    COLONOSCOPY  11/26/2008    diverticulosis    COLONOSCOPY  12/29/2017    Diverticulosis    HERNIA REPAIR      JOINT REPLACEMENT  Left knee Jan 2016    PILONIDAL CYST EXCISION      AR COLON CA SCRN NOT HI RSK IND N/A 12/29/2017    COLONOSCOPY performed by Jerzy Mcdowell MD at [facility] OR    ROTATOR CUFF REPAIR Right 06/2020    TOTAL KNEE ARTHROPLASTY Left 01/19/2016    [place]/Dr. Mac    TOTAL KNEE ARTHROPLASTY Right 12/2022    Dr Mac       No Known Allergies      Current Outpatient Medications:     oxyCODONE-acetaminophen (PERCOCET) 5-325 MG per tablet, Take 1

## 2025-05-22 ENCOUNTER — OFFICE VISIT (OUTPATIENT)
Dept: PAIN MANAGEMENT | Age: 75
End: 2025-05-22
Payer: MEDICARE

## 2025-05-22 VITALS — HEIGHT: 67 IN | WEIGHT: 195 LBS | BODY MASS INDEX: 30.61 KG/M2

## 2025-05-22 DIAGNOSIS — Z79.891 ENCOUNTER FOR LONG-TERM OPIATE ANALGESIC USE: ICD-10-CM

## 2025-05-22 DIAGNOSIS — G89.29 OTHER CHRONIC PAIN: ICD-10-CM

## 2025-05-22 DIAGNOSIS — M25.50 POLYARTHRALGIA: ICD-10-CM

## 2025-05-22 PROCEDURE — G8417 CALC BMI ABV UP PARAM F/U: HCPCS | Performed by: NURSE PRACTITIONER

## 2025-05-22 PROCEDURE — 1125F AMNT PAIN NOTED PAIN PRSNT: CPT | Performed by: NURSE PRACTITIONER

## 2025-05-22 PROCEDURE — 99213 OFFICE O/P EST LOW 20 MIN: CPT | Performed by: NURSE PRACTITIONER

## 2025-05-22 PROCEDURE — 1160F RVW MEDS BY RX/DR IN RCRD: CPT | Performed by: NURSE PRACTITIONER

## 2025-05-22 PROCEDURE — G8427 DOCREV CUR MEDS BY ELIG CLIN: HCPCS | Performed by: NURSE PRACTITIONER

## 2025-05-22 PROCEDURE — 1036F TOBACCO NON-USER: CPT | Performed by: NURSE PRACTITIONER

## 2025-05-22 PROCEDURE — 1123F ACP DISCUSS/DSCN MKR DOCD: CPT | Performed by: NURSE PRACTITIONER

## 2025-05-22 PROCEDURE — 1159F MED LIST DOCD IN RCRD: CPT | Performed by: NURSE PRACTITIONER

## 2025-05-22 RX ORDER — OXYCODONE AND ACETAMINOPHEN 5; 325 MG/1; MG/1
1 TABLET ORAL EVERY 8 HOURS PRN
Qty: 90 TABLET | Refills: 0 | Status: SHIPPED | OUTPATIENT
Start: 2025-05-25 | End: 2025-06-24

## 2025-05-22 ASSESSMENT — ENCOUNTER SYMPTOMS
COUGH: 0
BACK PAIN: 1
SHORTNESS OF BREATH: 0
CONSTIPATION: 0

## 2025-05-22 NOTE — PROGRESS NOTES
Chief Complaint   Patient presents with    Joint Pain    Medication Refill         [facility]     Patient complains of diffuse joint pain. Hx of rheumatoid arthritis and follows with rheumatologist. Currently prescribed diclofenac and states his methotrexate was d/c. He was taking prednisone daily but was advised by rheumatologist to taper off of this. He has not taken this since December 2023. He also takes percocet 5/325 TID for pain from our office. He has been stable and compliant on this dose for many years. He has had surgery on both shoulders and both knees.  Most recent was right TKR done 12/2022 with Dr. Mac. He still has some intermittent pain but this is managed with current medication. Has completed PT with some relief.        Joint Pain   The pain is present in the back and neck. This is a chronic problem. The current episode started more than 1 year ago. There has been no history of extremity trauma. The problem occurs constantly. The problem has been unchanged. The quality of the pain is described as aching. The pain is at a severity of 6/10. The pain is moderate. Associated symptoms include joint locking and a limited range of motion. Pertinent negatives include no fever. The symptoms are aggravated by activity, cold, contact, heat, lying down and standing. He has tried acetaminophen, movement, heat, cold, NSAIDS, OTC ointments, OTC pain meds, rest and oral narcotics for the symptoms.     Patient denies any new neurological symptoms. No bowel or bladder incontinence, no weakness, and no falling.    Pill count: 10 present with patient - due 5/25    Morphine equivalent: 22.5    Controlled Substance Monitoring:    Acute and Chronic Pain Monitoring:   RX Monitoring Periodic Controlled Substance Monitoring   5/22/2025  12:42 PM Possible medication side effects, risk of tolerance/dependence & alternative treatments discussed.;No signs of potential drug abuse or diversion identified.;Assessed

## 2025-06-02 RX ORDER — ATORVASTATIN CALCIUM 20 MG/1
20 TABLET, FILM COATED ORAL DAILY
Qty: 90 TABLET | Refills: 1 | Status: SHIPPED | OUTPATIENT
Start: 2025-06-02

## 2025-06-10 SDOH — HEALTH STABILITY: PHYSICAL HEALTH: ON AVERAGE, HOW MANY DAYS PER WEEK DO YOU ENGAGE IN MODERATE TO STRENUOUS EXERCISE (LIKE A BRISK WALK)?: 3 DAYS

## 2025-06-10 SDOH — HEALTH STABILITY: PHYSICAL HEALTH: ON AVERAGE, HOW MANY MINUTES DO YOU ENGAGE IN EXERCISE AT THIS LEVEL?: 90 MIN

## 2025-06-10 ASSESSMENT — LIFESTYLE VARIABLES
HOW OFTEN DO YOU HAVE A DRINK CONTAINING ALCOHOL: 1
HOW MANY STANDARD DRINKS CONTAINING ALCOHOL DO YOU HAVE ON A TYPICAL DAY: 0
HOW OFTEN DO YOU HAVE SIX OR MORE DRINKS ON ONE OCCASION: 1
HOW MANY STANDARD DRINKS CONTAINING ALCOHOL DO YOU HAVE ON A TYPICAL DAY: PATIENT DOES NOT DRINK
HOW OFTEN DO YOU HAVE A DRINK CONTAINING ALCOHOL: NEVER

## 2025-06-10 ASSESSMENT — PATIENT HEALTH QUESTIONNAIRE - PHQ9
SUM OF ALL RESPONSES TO PHQ QUESTIONS 1-9: 0
1. LITTLE INTEREST OR PLEASURE IN DOING THINGS: NOT AT ALL
2. FEELING DOWN, DEPRESSED OR HOPELESS: NOT AT ALL
SUM OF ALL RESPONSES TO PHQ QUESTIONS 1-9: 0

## 2025-06-13 ENCOUNTER — OFFICE VISIT (OUTPATIENT)
Dept: PRIMARY CARE CLINIC | Age: 75
End: 2025-06-13
Payer: MEDICARE

## 2025-06-13 VITALS
HEART RATE: 55 BPM | BODY MASS INDEX: 32.3 KG/M2 | SYSTOLIC BLOOD PRESSURE: 138 MMHG | RESPIRATION RATE: 18 BRPM | OXYGEN SATURATION: 97 % | HEIGHT: 67 IN | WEIGHT: 205.8 LBS | DIASTOLIC BLOOD PRESSURE: 66 MMHG

## 2025-06-13 DIAGNOSIS — R73.03 PREDIABETES: ICD-10-CM

## 2025-06-13 DIAGNOSIS — M05.79 RHEUMATOID ARTHRITIS INVOLVING MULTIPLE SITES WITH POSITIVE RHEUMATOID FACTOR (HCC): ICD-10-CM

## 2025-06-13 DIAGNOSIS — Z13.6 SCREENING FOR CARDIOVASCULAR CONDITION: ICD-10-CM

## 2025-06-13 DIAGNOSIS — N52.9 ERECTILE DYSFUNCTION, UNSPECIFIED ERECTILE DYSFUNCTION TYPE: ICD-10-CM

## 2025-06-13 DIAGNOSIS — F11.20 OPIOID DEPENDENCE WITH CURRENT USE (HCC): ICD-10-CM

## 2025-06-13 DIAGNOSIS — I10 ESSENTIAL HYPERTENSION: Chronic | ICD-10-CM

## 2025-06-13 DIAGNOSIS — E78.00 PURE HYPERCHOLESTEROLEMIA: ICD-10-CM

## 2025-06-13 DIAGNOSIS — M19.90 INFLAMMATORY ARTHROPATHY: ICD-10-CM

## 2025-06-13 DIAGNOSIS — Z00.00 MEDICARE ANNUAL WELLNESS VISIT, SUBSEQUENT: Primary | ICD-10-CM

## 2025-06-13 DIAGNOSIS — Z12.5 SCREENING FOR MALIGNANT NEOPLASM OF PROSTATE: ICD-10-CM

## 2025-06-13 PROBLEM — E11.9 TYPE 2 DIABETES MELLITUS WITHOUT COMPLICATION, WITHOUT LONG-TERM CURRENT USE OF INSULIN (HCC): Status: ACTIVE | Noted: 2025-06-13

## 2025-06-13 PROBLEM — E11.9 TYPE 2 DIABETES MELLITUS WITHOUT COMPLICATION, WITHOUT LONG-TERM CURRENT USE OF INSULIN (HCC): Status: RESOLVED | Noted: 2025-06-13 | Resolved: 2025-06-13

## 2025-06-13 PROCEDURE — G0439 PPPS, SUBSEQ VISIT: HCPCS | Performed by: NURSE PRACTITIONER

## 2025-06-13 PROCEDURE — 1159F MED LIST DOCD IN RCRD: CPT | Performed by: NURSE PRACTITIONER

## 2025-06-13 PROCEDURE — 1160F RVW MEDS BY RX/DR IN RCRD: CPT | Performed by: NURSE PRACTITIONER

## 2025-06-13 PROCEDURE — 1123F ACP DISCUSS/DSCN MKR DOCD: CPT | Performed by: NURSE PRACTITIONER

## 2025-06-13 PROCEDURE — 3075F SYST BP GE 130 - 139MM HG: CPT | Performed by: NURSE PRACTITIONER

## 2025-06-13 PROCEDURE — 3078F DIAST BP <80 MM HG: CPT | Performed by: NURSE PRACTITIONER

## 2025-06-13 SDOH — ECONOMIC STABILITY: FOOD INSECURITY: WITHIN THE PAST 12 MONTHS, YOU WORRIED THAT YOUR FOOD WOULD RUN OUT BEFORE YOU GOT MONEY TO BUY MORE.: NEVER TRUE

## 2025-06-13 SDOH — ECONOMIC STABILITY: FOOD INSECURITY: WITHIN THE PAST 12 MONTHS, THE FOOD YOU BOUGHT JUST DIDN'T LAST AND YOU DIDN'T HAVE MONEY TO GET MORE.: NEVER TRUE

## 2025-06-13 NOTE — PROGRESS NOTES
(VASOTEC) 10 MG tablet TAKE 1 TABLET BY MOUTH EVERY DAY Yes Berta Moreno APRN - CNP   metoprolol tartrate (LOPRESSOR) 25 MG tablet TAKE 1 TABLET BY MOUTH 2 TIMES A DAY Yes Berta Moreno APRN - CNP   sildenafil (VIAGRA) 100 MG tablet Take 1 tablet by mouth daily as needed for Erectile Dysfunction Yes Berta Moreno APRN - CNP   Iron-Vitamin C (IRON 100/C) 100-250 MG TABS Iron Yes Bob Valencia MD   omeprazole (PRILOSEC) 20 MG delayed release capsule TAKE 1 CAPSULE BY MOUTH 30 minutes before morning meal Yes Bob Valencia MD   ELDERBERRY PO Take 2 tablets by mouth daily Yes Bob Valencia MD   Multiple Vitamins-Minerals (PRESERVISION/LUTEIN PO) Take 2 capsules by mouth daily Yes Bob Valencia MD   folic acid (FOLVITE) 1 MG tablet daily Yes Bob Valencia MD   CALCIUM PO Take  by mouth. Yes Bob Valencia MD   diclofenac (VOLTAREN) 75 MG EC tablet Take 1 tablet by mouth 2 times daily as needed  Patient not taking: Reported on 6/13/2025  Provider, Historical, MD       [facility] (Including outside providers/suppliers regularly involved in providing care):   Patient Care Team:  Berta Moreno APRN - CNP as PCP - General (Nurse Practitioner)  Berta Moreno APRN - CNP as PCP - Empaneled Provider  You Bryson MD (Rheumatology)  Willie Mac MD as Consulting Physician (Orthopedic Surgery)     Recommendations for Preventive Services Due: see orders and patient instructions/AVS.  Recommended screening schedule for the next 5-10 years is provided to the patient in written form: see Patient Instructions/AVS.     Reviewed and updated this visit:  Tobacco  Allergies  Meds  Problems  Med Hx  Surg Hx  Fam Hx  Sexual   Hx

## 2025-06-19 ENCOUNTER — OFFICE VISIT (OUTPATIENT)
Dept: PAIN MANAGEMENT | Age: 75
End: 2025-06-19
Payer: MEDICARE

## 2025-06-19 VITALS — WEIGHT: 205 LBS | BODY MASS INDEX: 32.18 KG/M2 | HEIGHT: 67 IN

## 2025-06-19 DIAGNOSIS — Z79.891 ENCOUNTER FOR LONG-TERM OPIATE ANALGESIC USE: ICD-10-CM

## 2025-06-19 DIAGNOSIS — M25.50 POLYARTHRALGIA: ICD-10-CM

## 2025-06-19 DIAGNOSIS — G89.29 OTHER CHRONIC PAIN: ICD-10-CM

## 2025-06-19 PROCEDURE — 1036F TOBACCO NON-USER: CPT | Performed by: NURSE PRACTITIONER

## 2025-06-19 PROCEDURE — G8417 CALC BMI ABV UP PARAM F/U: HCPCS | Performed by: NURSE PRACTITIONER

## 2025-06-19 PROCEDURE — G8427 DOCREV CUR MEDS BY ELIG CLIN: HCPCS | Performed by: NURSE PRACTITIONER

## 2025-06-19 PROCEDURE — 99213 OFFICE O/P EST LOW 20 MIN: CPT | Performed by: NURSE PRACTITIONER

## 2025-06-19 PROCEDURE — 1160F RVW MEDS BY RX/DR IN RCRD: CPT | Performed by: NURSE PRACTITIONER

## 2025-06-19 PROCEDURE — 1123F ACP DISCUSS/DSCN MKR DOCD: CPT | Performed by: NURSE PRACTITIONER

## 2025-06-19 PROCEDURE — 1159F MED LIST DOCD IN RCRD: CPT | Performed by: NURSE PRACTITIONER

## 2025-06-19 RX ORDER — OXYCODONE AND ACETAMINOPHEN 5; 325 MG/1; MG/1
1 TABLET ORAL EVERY 8 HOURS PRN
Qty: 90 TABLET | Refills: 0 | Status: SHIPPED | OUTPATIENT
Start: 2025-06-24 | End: 2025-07-24

## 2025-06-19 ASSESSMENT — ENCOUNTER SYMPTOMS
COUGH: 0
CONSTIPATION: 0
SHORTNESS OF BREATH: 0

## 2025-06-19 NOTE — PROGRESS NOTES
Chief Complaint   Patient presents with    Joint Pain     Med refill         [facility]    Patient complains of diffuse joint pain. Hx of rheumatoid arthritis and follows with rheumatologist. Currently prescribed diclofenac and states his methotrexate was d/c. He was taking prednisone daily but was advised by rheumatologist to taper off of this. He has not taken this since December 2023. He also takes percocet 5/325 TID for pain from our office. He has been stable and compliant on this dose for many years. He has had surgery on both shoulders and both knees.  Most recent was right TKR done 12/2022 with Dr. Mac. He still has some intermittent pain but this is managed with current medication. Has completed PT with some relief.       He saw his rheumatologist last week and had some labs that showed elevated creatinine.  He was advised to discontinue NSAIDs. This has increased his pain. He will repeat labs in three months and follow up with rheumatology.       Joint Pain   The pain is present in the back, neck, left shoulder, right shoulder, right hand and left hand. This is a chronic problem. The current episode started more than 1 year ago. There has been no history of extremity trauma. The problem occurs constantly. The problem has been unchanged. The quality of the pain is described as aching and sharp. The pain is at a severity of 7/10. The pain is moderate. Pertinent negatives include no fever. The symptoms are aggravated by activity, cold, contact, heat, lying down and standing. He has tried acetaminophen, movement, heat, cold, NSAIDS, OTC ointments, OTC pain meds, rest and oral narcotics for the symptoms.       Patient denies any new neurological symptoms. No bowel or bladder incontinence, no weakness, and no falling.    Pill count: 18 - due 6/24    Morphine equivalent: 22.5    Controlled Substance Monitoring:    Acute and Chronic Pain Monitoring:   RX Monitoring Periodic Controlled Substance

## 2025-06-20 ENCOUNTER — HOSPITAL ENCOUNTER (OUTPATIENT)
Age: 75
Setting detail: SPECIMEN
Discharge: HOME | End: 2025-06-20

## 2025-06-20 ENCOUNTER — RESULTS FOLLOW-UP (OUTPATIENT)
Dept: PRIMARY CARE CLINIC | Age: 75
End: 2025-06-20

## 2025-06-20 DIAGNOSIS — Z12.5 SCREENING FOR MALIGNANT NEOPLASM OF PROSTATE: ICD-10-CM

## 2025-06-20 DIAGNOSIS — Z13.6 SCREENING FOR CARDIOVASCULAR CONDITION: ICD-10-CM

## 2025-06-20 DIAGNOSIS — R73.03 PREDIABETES: ICD-10-CM

## 2025-06-20 LAB
CHOLEST SERPL-MCNC: 116 MG/DL (ref 0–199)
CHOLESTEROL/HDL RATIO: 2.8
EST. AVERAGE GLUCOSE BLD GHB EST-MCNC: 117 MG/DL
HBA1C MFR BLD: 5.7 % (ref 4–6)
HDLC SERPL-MCNC: 42 MG/DL
LDLC SERPL CALC-MCNC: 56 MG/DL (ref 0–100)
PSA SERPL-MCNC: 0.51 NG/ML (ref 0–4)
TRIGL SERPL-MCNC: 90 MG/DL
VLDLC SERPL CALC-MCNC: 18 MG/DL (ref 1–30)

## 2025-07-24 ENCOUNTER — OFFICE VISIT (OUTPATIENT)
Dept: PAIN MANAGEMENT | Age: 75
End: 2025-07-24
Payer: MEDICARE

## 2025-07-24 VITALS — WEIGHT: 205 LBS | HEIGHT: 67 IN | BODY MASS INDEX: 32.18 KG/M2

## 2025-07-24 DIAGNOSIS — M25.50 POLYARTHRALGIA: ICD-10-CM

## 2025-07-24 DIAGNOSIS — Z79.891 ENCOUNTER FOR LONG-TERM OPIATE ANALGESIC USE: ICD-10-CM

## 2025-07-24 DIAGNOSIS — G89.29 OTHER CHRONIC PAIN: ICD-10-CM

## 2025-07-24 PROCEDURE — 1160F RVW MEDS BY RX/DR IN RCRD: CPT | Performed by: NURSE PRACTITIONER

## 2025-07-24 PROCEDURE — 1159F MED LIST DOCD IN RCRD: CPT | Performed by: NURSE PRACTITIONER

## 2025-07-24 PROCEDURE — 1036F TOBACCO NON-USER: CPT | Performed by: NURSE PRACTITIONER

## 2025-07-24 PROCEDURE — 1123F ACP DISCUSS/DSCN MKR DOCD: CPT | Performed by: NURSE PRACTITIONER

## 2025-07-24 PROCEDURE — 1125F AMNT PAIN NOTED PAIN PRSNT: CPT | Performed by: NURSE PRACTITIONER

## 2025-07-24 PROCEDURE — G8417 CALC BMI ABV UP PARAM F/U: HCPCS | Performed by: NURSE PRACTITIONER

## 2025-07-24 PROCEDURE — G8427 DOCREV CUR MEDS BY ELIG CLIN: HCPCS | Performed by: NURSE PRACTITIONER

## 2025-07-24 PROCEDURE — 99213 OFFICE O/P EST LOW 20 MIN: CPT | Performed by: NURSE PRACTITIONER

## 2025-07-24 RX ORDER — OXYCODONE AND ACETAMINOPHEN 5; 325 MG/1; MG/1
1 TABLET ORAL EVERY 8 HOURS PRN
Qty: 90 TABLET | Refills: 0 | Status: SHIPPED | OUTPATIENT
Start: 2025-07-24 | End: 2025-08-23

## 2025-07-24 ASSESSMENT — ENCOUNTER SYMPTOMS
CONSTIPATION: 0
SHORTNESS OF BREATH: 0
COUGH: 0
BACK PAIN: 1

## 2025-07-24 NOTE — PROGRESS NOTES
Chief Complaint   Patient presents with    Joint Pain    Medication Refill         [facility]     Patient complains of diffuse joint pain. Hx of rheumatoid arthritis and follows with rheumatologist. Currently prescribed diclofenac and states his methotrexate was d/c. He was taking prednisone daily but was advised by rheumatologist to taper off of this. He has not taken this since December 2023. He also takes percocet 5/325 TID for pain from our office. He has been stable and compliant on this dose for many years. He has had surgery on both shoulders and both knees.  Most recent was right TKR done 12/2022 with Dr. Mac. He still has some intermittent pain but this is managed with current medication. Has completed PT with some relief.        He saw his rheumatologist last week and had some labs that showed elevated creatinine.  He was advised to discontinue NSAIDs. This has increased his pain. He did repeat labs with improvement in kidney function and he was told he could resume NSAIDs.       Joint Pain   The pain is present in the left hand, right hand and back. This is a chronic problem. The current episode started more than 1 year ago. There has been no history of extremity trauma. The problem occurs constantly. The problem has been unchanged. The quality of the pain is described as aching. Pertinent negatives include no fever. The symptoms are aggravated by activity, standing and contact. He has tried movement, heat, cold, NSAIDS, acetaminophen, OTC ointments, OTC pain meds, rest and oral narcotics for the symptoms. Family history includes gout. His past medical history is significant for osteoarthritis and rheumatoid arthritis.     Patient denies any new neurological symptoms. No bowel or bladder incontinence, no weakness, and no falling.    Pill count: Patient has 3 pills present - due 7/24    Morphine equivalent: 22.5    Controlled Substance Monitoring:    Acute and Chronic Pain Monitoring:   RX

## 2025-07-27 RX ORDER — ENALAPRIL MALEATE 10 MG/1
10 TABLET ORAL DAILY
Qty: 90 TABLET | Refills: 1 | Status: SHIPPED | OUTPATIENT
Start: 2025-07-27

## 2025-07-27 RX ORDER — AMLODIPINE BESYLATE 10 MG/1
10 TABLET ORAL DAILY
Qty: 90 TABLET | Refills: 1 | Status: SHIPPED | OUTPATIENT
Start: 2025-07-27

## 2025-08-21 ENCOUNTER — OFFICE VISIT (OUTPATIENT)
Dept: PAIN MANAGEMENT | Age: 75
End: 2025-08-21
Payer: MEDICARE

## 2025-08-21 ENCOUNTER — HOSPITAL ENCOUNTER (OUTPATIENT)
Age: 75
Setting detail: SPECIMEN
Discharge: HOME | End: 2025-08-21

## 2025-08-21 VITALS — HEIGHT: 67 IN | WEIGHT: 205 LBS | BODY MASS INDEX: 32.18 KG/M2

## 2025-08-21 DIAGNOSIS — M25.50 POLYARTHRALGIA: ICD-10-CM

## 2025-08-21 DIAGNOSIS — Z79.891 CHRONIC USE OF OPIATE FOR THERAPEUTIC PURPOSE: Primary | ICD-10-CM

## 2025-08-21 DIAGNOSIS — G89.29 OTHER CHRONIC PAIN: ICD-10-CM

## 2025-08-21 DIAGNOSIS — Z79.891 ENCOUNTER FOR LONG-TERM OPIATE ANALGESIC USE: ICD-10-CM

## 2025-08-21 PROCEDURE — 99214 OFFICE O/P EST MOD 30 MIN: CPT | Performed by: NURSE PRACTITIONER

## 2025-08-21 PROCEDURE — 1036F TOBACCO NON-USER: CPT | Performed by: NURSE PRACTITIONER

## 2025-08-21 PROCEDURE — G8417 CALC BMI ABV UP PARAM F/U: HCPCS | Performed by: NURSE PRACTITIONER

## 2025-08-21 PROCEDURE — G8427 DOCREV CUR MEDS BY ELIG CLIN: HCPCS | Performed by: NURSE PRACTITIONER

## 2025-08-21 PROCEDURE — 1123F ACP DISCUSS/DSCN MKR DOCD: CPT | Performed by: NURSE PRACTITIONER

## 2025-08-21 PROCEDURE — 1160F RVW MEDS BY RX/DR IN RCRD: CPT | Performed by: NURSE PRACTITIONER

## 2025-08-21 PROCEDURE — 1159F MED LIST DOCD IN RCRD: CPT | Performed by: NURSE PRACTITIONER

## 2025-08-21 RX ORDER — OXYCODONE AND ACETAMINOPHEN 5; 325 MG/1; MG/1
1 TABLET ORAL EVERY 8 HOURS PRN
Qty: 90 TABLET | Refills: 0 | Status: SHIPPED | OUTPATIENT
Start: 2025-08-23 | End: 2025-09-22

## 2025-08-21 ASSESSMENT — ENCOUNTER SYMPTOMS
CONSTIPATION: 0
SHORTNESS OF BREATH: 0
COUGH: 0

## 2025-08-22 DIAGNOSIS — Z79.891 CHRONIC USE OF OPIATE FOR THERAPEUTIC PURPOSE: ICD-10-CM

## 2025-08-27 LAB
6-ACETYLMORPHINE, UR: NOT DETECTED
7-AMINOCLONAZEPAM, URINE: NOT DETECTED
ALPHA-OH-ALPRAZ, URINE: NOT DETECTED
ALPHA-OH-MIDAZOLAM, URINE: NOT DETECTED
ALPRAZOLAM, URINE: NOT DETECTED
AMPHETAMINE, URINE: NOT DETECTED
BARBITURATES, URINE: NEGATIVE
BENZOYLECGONINE, UR: NEGATIVE
BUPRENORPHINE URINE: NOT DETECTED
CARISOPRODOL, URINE: NEGATIVE
CLONAZEPAM, URINE: NOT DETECTED
CODEINE, URINE: NOT DETECTED
CREAT UR-MCNC: 50 MG/DL (ref 20–400)
DIAZEPAM, URINE: NOT DETECTED
DRUGS EXPECTED, UR: ABNORMAL
EER HI RES INTERP UR: ABNORMAL
ETHYL GLUCURONIDE UR: NEGATIVE
FENTANYL URINE: NOT DETECTED
GABAPENTIN: NOT DETECTED
HYDROCODONE, URINE: NOT DETECTED
HYDROMORPHONE, URINE: NOT DETECTED
LORAZEPAM, URINE: NOT DETECTED
MARIJUANA METAB, UR: NEGATIVE
MDA, URINE: NOT DETECTED
MDEA, EVE, UR: NOT DETECTED
MDMA, URINE: NOT DETECTED
MEPERIDINE METAB, UR: NOT DETECTED
METHADONE, URINE: NEGATIVE
METHAMPHETAMINE, URINE: NOT DETECTED
METHYLPHENIDATE: NOT DETECTED
MIDAZOLAM, URINE: NOT DETECTED
MORPHINE, OPI1M: NOT DETECTED
NALOXONE URINE: NOT DETECTED
NORBUPRENORPHINE, URINE: NOT DETECTED
NORDIAZEPAM, URINE: NOT DETECTED
NORFENTANYL, URINE: NOT DETECTED
NORHYDROCODONE, URINE: NOT DETECTED
NOROXYCODONE, URINE: PRESENT
NOROXYMORPHONE, URINE: PRESENT
OXAZEPAM, URINE: NOT DETECTED
OXYCODONE URINE: PRESENT
OXYMORPHONE, URINE: PRESENT
PAIN MANAGEMENT DRUG PANEL INTERP, URINE: ABNORMAL
PAIN MGT DRUG PANEL, HI RES, UR: ABNORMAL
PCP,URINE: NEGATIVE
PHENTERMINE, UR: NOT DETECTED
PREGABALIN: NOT DETECTED
TAPENTADOL, URINE: NOT DETECTED
TAPENTADOL-O-SULFATE, URINE: NOT DETECTED
TEMAZEPAM, URINE: NOT DETECTED
TRAMADOL, URINE: NEGATIVE
ZOLPIDEM METABOLITE (ZCA), URINE: NOT DETECTED
ZOLPIDEM, URINE: NOT DETECTED

## 2025-08-29 DIAGNOSIS — I10 HYPERTENSION, UNSPECIFIED TYPE: ICD-10-CM

## 2025-08-29 RX ORDER — METOPROLOL TARTRATE 25 MG/1
25 TABLET, FILM COATED ORAL 2 TIMES DAILY
Qty: 180 TABLET | Refills: 1 | Status: SHIPPED | OUTPATIENT
Start: 2025-08-29

## (undated) DEVICE — DEFENDO AIR WATER SUCTION AND BIOPSY VALVE KIT FOR  OLYMPUS: Brand: DEFENDO AIR/WATER/SUCTION AND BIOPSY VALVE

## (undated) DEVICE — AIRLIFE™ NASAL OXYGEN CANNULA CURVED, FLARED TIP, WITH 7 FEET (2.1 M) CRUSH RESISTANT TUBING, OVER-THE-EAR STYLE: Brand: AIRLIFE™

## (undated) DEVICE — DUP USE 393023 JELLY LUBRICATING EZ 2OZ